# Patient Record
Sex: FEMALE | URBAN - METROPOLITAN AREA
[De-identification: names, ages, dates, MRNs, and addresses within clinical notes are randomized per-mention and may not be internally consistent; named-entity substitution may affect disease eponyms.]

---

## 2017-10-18 ENCOUNTER — INPATIENT (INPATIENT)
Facility: HOSPITAL | Age: 82
LOS: 5 days | Discharge: NURSING, REHAB OR LONG-TERM CARE | End: 2017-10-24
Attending: INTERNAL MEDICINE | Admitting: INTERNAL MEDICINE
Payer: MEDICARE

## 2017-10-18 VITALS
OXYGEN SATURATION: 99 % | DIASTOLIC BLOOD PRESSURE: 104 MMHG | RESPIRATION RATE: 20 BRPM | SYSTOLIC BLOOD PRESSURE: 159 MMHG | HEART RATE: 94 BPM | TEMPERATURE: 97 F

## 2017-10-18 LAB
ALBUMIN SERPL ELPH-MCNC: 3 G/DL — LOW (ref 3.3–5)
ALP SERPL-CCNC: 125 U/L — HIGH (ref 40–120)
ALT FLD-CCNC: 21 U/L — SIGNIFICANT CHANGE UP (ref 12–78)
AMYLASE P1 CFR SERPL: 138 U/L — HIGH (ref 25–115)
ANION GAP SERPL CALC-SCNC: 12 MMOL/L — SIGNIFICANT CHANGE UP (ref 5–17)
APTT BLD: 51 SEC — HIGH (ref 27.5–37.4)
AST SERPL-CCNC: 42 U/L — HIGH (ref 15–37)
BASOPHILS # BLD AUTO: 0.1 K/UL — SIGNIFICANT CHANGE UP (ref 0–0.2)
BASOPHILS NFR BLD AUTO: 1 % — SIGNIFICANT CHANGE UP (ref 0–2)
BILIRUB DIRECT SERPL-MCNC: 0.12 MG/DL — SIGNIFICANT CHANGE UP (ref 0.05–0.2)
BILIRUB INDIRECT FLD-MCNC: 0.3 MG/DL — SIGNIFICANT CHANGE UP (ref 0.2–1)
BILIRUB SERPL-MCNC: 0.4 MG/DL — SIGNIFICANT CHANGE UP (ref 0.2–1.2)
BUN SERPL-MCNC: 19 MG/DL — SIGNIFICANT CHANGE UP (ref 7–23)
CALCIUM SERPL-MCNC: 9.3 MG/DL — SIGNIFICANT CHANGE UP (ref 8.5–10.1)
CHLORIDE SERPL-SCNC: 107 MMOL/L — SIGNIFICANT CHANGE UP (ref 96–108)
CK MB BLD-MCNC: 3.2 % — SIGNIFICANT CHANGE UP (ref 0–3.5)
CK MB CFR SERPL CALC: 7.7 NG/ML — HIGH (ref 0.5–3.6)
CK SERPL-CCNC: 240 U/L — HIGH (ref 26–192)
CO2 SERPL-SCNC: 23 MMOL/L — SIGNIFICANT CHANGE UP (ref 22–31)
CREAT SERPL-MCNC: 1.74 MG/DL — HIGH (ref 0.5–1.3)
EOSINOPHIL # BLD AUTO: 0 K/UL — SIGNIFICANT CHANGE UP (ref 0–0.5)
EOSINOPHIL NFR BLD AUTO: 0.4 % — SIGNIFICANT CHANGE UP (ref 0–6)
GLUCOSE BLDC GLUCOMTR-MCNC: 178 MG/DL — HIGH (ref 70–99)
GLUCOSE SERPL-MCNC: 130 MG/DL — HIGH (ref 70–99)
HCT VFR BLD CALC: 51.7 % — HIGH (ref 34.5–45)
HGB BLD-MCNC: 16.2 G/DL — HIGH (ref 11.5–15.5)
INR BLD: 8.74 RATIO — CRITICAL HIGH (ref 0.88–1.16)
LIDOCAIN IGE QN: 137 U/L — SIGNIFICANT CHANGE UP (ref 73–393)
LYMPHOCYTES # BLD AUTO: 1.4 K/UL — SIGNIFICANT CHANGE UP (ref 1–3.3)
LYMPHOCYTES # BLD AUTO: 18.4 % — SIGNIFICANT CHANGE UP (ref 13–44)
MCHC RBC-ENTMCNC: 30.4 PG — SIGNIFICANT CHANGE UP (ref 27–34)
MCHC RBC-ENTMCNC: 31.4 GM/DL — LOW (ref 32–36)
MCV RBC AUTO: 96.6 FL — SIGNIFICANT CHANGE UP (ref 80–100)
MONOCYTES # BLD AUTO: 0.8 K/UL — SIGNIFICANT CHANGE UP (ref 0–0.9)
MONOCYTES NFR BLD AUTO: 10.8 % — SIGNIFICANT CHANGE UP (ref 2–14)
NEUTROPHILS # BLD AUTO: 5.2 K/UL — SIGNIFICANT CHANGE UP (ref 1.8–7.4)
NEUTROPHILS NFR BLD AUTO: 69.4 % — SIGNIFICANT CHANGE UP (ref 43–77)
PLATELET # BLD AUTO: 192 K/UL — SIGNIFICANT CHANGE UP (ref 150–400)
POTASSIUM SERPL-MCNC: 4.8 MMOL/L — SIGNIFICANT CHANGE UP (ref 3.5–5.3)
POTASSIUM SERPL-SCNC: 4.8 MMOL/L — SIGNIFICANT CHANGE UP (ref 3.5–5.3)
PROT SERPL-MCNC: 8.8 GM/DL — HIGH (ref 6–8.3)
PROTHROM AB SERPL-ACNC: 99.6 SEC — HIGH (ref 9.8–12.7)
RBC # BLD: 5.35 M/UL — HIGH (ref 3.8–5.2)
RBC # FLD: 15.2 % — HIGH (ref 11–15)
SODIUM SERPL-SCNC: 142 MMOL/L — SIGNIFICANT CHANGE UP (ref 135–145)
TROPONIN I SERPL-MCNC: 1.8 NG/ML — HIGH (ref 0.01–0.04)
WBC # BLD: 7.4 K/UL — SIGNIFICANT CHANGE UP (ref 3.8–10.5)
WBC # FLD AUTO: 7.4 K/UL — SIGNIFICANT CHANGE UP (ref 3.8–10.5)

## 2017-10-18 PROCEDURE — 99285 EMERGENCY DEPT VISIT HI MDM: CPT

## 2017-10-18 PROCEDURE — 99223 1ST HOSP IP/OBS HIGH 75: CPT

## 2017-10-18 PROCEDURE — 70450 CT HEAD/BRAIN W/O DYE: CPT | Mod: 26

## 2017-10-18 PROCEDURE — 71010: CPT | Mod: 26

## 2017-10-18 RX ORDER — LEVETIRACETAM 250 MG/1
500 TABLET, FILM COATED ORAL EVERY 12 HOURS
Qty: 0 | Refills: 0 | Status: DISCONTINUED | OUTPATIENT
Start: 2017-10-18 | End: 2017-10-19

## 2017-10-18 RX ORDER — ASPIRIN/CALCIUM CARB/MAGNESIUM 325 MG
325 TABLET ORAL ONCE
Qty: 0 | Refills: 0 | Status: COMPLETED | OUTPATIENT
Start: 2017-10-18 | End: 2017-10-18

## 2017-10-18 RX ORDER — SODIUM CHLORIDE 9 MG/ML
1000 INJECTION INTRAMUSCULAR; INTRAVENOUS; SUBCUTANEOUS
Qty: 0 | Refills: 0 | Status: DISCONTINUED | OUTPATIENT
Start: 2017-10-18 | End: 2017-10-19

## 2017-10-18 RX ADMIN — Medication 325 MILLIGRAM(S): at 21:41

## 2017-10-18 RX ADMIN — LEVETIRACETAM 420 MILLIGRAM(S): 250 TABLET, FILM COATED ORAL at 22:30

## 2017-10-18 RX ADMIN — SODIUM CHLORIDE 100 MILLILITER(S): 9 INJECTION INTRAMUSCULAR; INTRAVENOUS; SUBCUTANEOUS at 19:08

## 2017-10-18 NOTE — ED ADULT TRIAGE NOTE - CHIEF COMPLAINT QUOTE
pt complaining of weakness, decreased appetite and decreased responsiveness times 3 weeks. as per EMS pt was hypotensive on arrival 80/50.  ml given by EMS. blood pressure 159/104 at triage. pt has history of dementia, tia and hypertension

## 2017-10-18 NOTE — ED PROVIDER NOTE - PROGRESS NOTE DETAILS
spoke to dr morales who recommends to call cardiology spoke to dr gee who states that after a seizure activity it is common to have elevated troponins, pt can continue coumadin or can start heparin, she feels this is all neuro

## 2017-10-18 NOTE — ED PROVIDER NOTE - OBJECTIVE STATEMENT
84 year old female presents today brought in with her  and daughter c/o syncope and seizure like activity today, pt was in the bed when she lean over, eyes open and started shaking +urinary and bowel incontinence (-) nausea or vomiting +increased confused as per the family (-) fevers or chills

## 2017-10-18 NOTE — ED PROVIDER NOTE - PMH
Afib (ICD9 427.31)    Anxiety (ICD9 300.00)    Atrial Fibrillation (ICD9 427.31)    CAD (Coronary Artery Disease) (ICD9 414.00)  with STENTS in  apast last CATH  in 10/2009 with 90% MID LAD on Medical managemnt  CVA (Cerebral Vascular Accident) (ICD9 434.91)    Dementia    Dementia (ICD9 294.8)    Depression    GERD (Gastroesophageal Reflux Disease) (ICD9 530.81)    HTN    HTN (hypertension)    Hx of Breast Cancer (ICD9 V10.3)    Hypercholesterolemia (ICD9 272.0)    Hypothyroidism (ICD9 244.9)    Mitral Valve Prolapse    Transient Ischemic Attack (TIA) (ICD9 435.9)

## 2017-10-18 NOTE — CONSULT NOTE ADULT - SUBJECTIVE AND OBJECTIVE BOX
Historian:       RHIANNA:  HIPOLITO AGUILAR.  · Chief Complaint: The patient is a 84y Female complaining of weakness.  · HPI Objective Statement: 84 year old female presents today brought in with her  and daughter c/o syncope and seizure like activity today, pt was in the bed when she lean over, eyes open and started shaking +urinary and bowel incontinence (-) nausea or vomiting +increased confused as per the family (-) fevers or chills    PAST MEDICAL & SURGICAL HISTORY:  HTN (hypertension); Dementia; Mitral Valve Prolapse; Depression; CVA (Cerebral Vascular Accident) (ICD9 434.91); CAD (Coronary Artery Disease) (ICD9 414.00): with STENTS in  apast last CATH  in 10/2009 with 90% MID LAD on Medical management; Dementia (ICD9 294.8); Anxiety (ICD9 300.00); GERD (Gastroesophageal Reflux Disease) (ICD9 530.81); Atrial Fibrillation (ICD9 427.31); Afib (ICD9 427.31); Hypothyroidism (ICD9 244.9); Hx of Breast Cancer (ICD9 V10.3); Hypercholesterolemia (ICD9 272.0); HTN;  Transient Ischemic Attack (TIA) (ICD9 435.9); No significant past surgical history; S/P Breast Lumpectomy (ICD9 V45.89);  Section (ICD9 669.70); After Cataract, Bilateral (ICD9 366.50)    MEDICATIONS  (STANDING):  levETIRAcetam  IVPB 500 milliGRAM(s) IV Intermittent every 12 hours  sodium chloride 0.9%. 1000 milliLiter(s) (100 mL/Hr) IV Continuous <Continuous>    MEDICATIONS  (PRN):  Allergies: latex (Unknown); No Known Drug Allergies; shellfish (Other); shellfish (Rash)  Intolerances  FAMILY HISTORY:  Family history of stroke    Vital Signs Last 24 Hrs  T(C): 36.4 (18 Oct 2017 22:06), Max: 36.4 (18 Oct 2017 22:06)  T(F): 97.6 (18 Oct 2017 22:06), Max: 97.6 (18 Oct 2017 22:06)  HR: 89 (18 Oct 2017 22:06) (89 - 94)  BP: 154/94 (18 Oct 2017 22:06) (154/94 - 159/104)  BP(mean): --  RR: 18 (18 Oct 2017 22:06) (18 - 20)  SpO2: 99% (18 Oct 2017 22:06) (99% - 99%)    NEUROLOGICAL EXAM:  HENT:  Normocephalic head; atraumatic head.  Neck supple.  ENT: normal looking.  Mental State:    Alert.  Fully oriented to person, place and date.  Coherent.  Speech clear and intact.  Cooperative.  Responds appropriately.    Cranial Nerves:  II-XII:   Pupils round and reactive to light and accommodation.  Extraocular movements full.  Visual fields full (no homonymous hemianopsia).  Visual acuity wnl.  Facial symmetry intact.  Tongue midline.  Motor Functions:  Moves all extremities.  No pronator drift of UE.  Claps hand well.  Hand  intact bilaterally.  Ambulatory.    Sensory Functions:   Intact to touch and pinprick to face and extremities.    Reflexes:  Deep tendon reflexes normoactive to biceps, knees and ankles.  Babinski absent (present).  Cerebellar Testing:    Finger to nose intact.  Nystagmus absent.  Neurovascular: Carotid auscultation full without bruits.      LABS:                        16.2   7.4   )-----------( 192      ( 18 Oct 2017 17:34 )             51.7     10-18    142  |  107  |  19  ----------------------------<  130<H>  4.8   |  23  |  1.74<H>    Ca    9.3      18 Oct 2017 17:34    TPro  8.8<H>  /  Alb  3.0<L>  /  TBili  0.4  /  DBili  .12  /  AST  42<H>  /  ALT  21  /  AlkPhos  125<H>  10-18    PT/INR - ( 18 Oct 2017 21:57 )   PT: 99.6 sec;   INR: 8.74 ratio         PTT - ( 18 Oct 2017 21:57 )  PTT:51.0 sec        RADIOLOGY & ADDITIONAL STUDIES:    Complete Blood Count + Automated Diff: STAT (10-18 @ 16:25)  Comprehensive Metabolic Panel: STAT (10-18 @ 16:25)  Amylase, Serum Total: STAT (10-18 @ 16:25)  Lipase, Serum: STAT (10-18 @ 16:25)  Hepatic Function Panel: STAT  Cancel Reason: Duplicate Order (10-18 @ 16:25)  Urinalysis: STAT (10-18 @ 16:25)  Culture - Urine: Routine  Specimen Source: Clean Catch (Midstream) (10-18 @ 16:25)  POCT  Blood Glucose: 16:25 (10-18 @ 16:30)  Bilirubin Direct, Serum: 17:30 (10-18 @ 17:34)  Indirect Reacting Bilirubin: 17:30 (10-18 @ 17:34)  CT Head No Cont: Urgent   Indication: unresponsive episode  Transport: Stretcher-Crib  Exam Completed  Provider's Contact #: [phone] (10-18 @ 18:45)  Xray Chest 1 View AP/PA.: Urgent   Indication: r/o infiltrate  Transport: Stretcher-Crib  Exam Completed  Provider's Contact #: [phone] (10-18 @ 18:45)  sodium chloride 0.9%.: Solution, 1000 milliLiter(s) infuse at 100 mL/Hr  Provider's Contact #: [phone] (10-18 @ 18:55)  Troponin I, Serum: STAT (10-18 @ 18:56)  Creatine Kinase, Serum: STAT (10-18 @ 18:56)  CKMB Mass Assay: STAT (10-18 @ 18:56)  12 Lead ECG:   Provider's Contact #: [phone] (10-18 @ 18:56)  aspirin buffered:   325 milliGRAM(s), Oral, Once, Stop After 1 Doses  Provider's Contact #: [phone] (10-18 @ 20:38)  Prothrombin Time and INR, Plasma:  Start Date:18-Oct-2017. STAT (10-18 @ 21:27)  Activated Partial Thromboplastin Time:  Start Date:18-Oct-2017. STAT (10-18 @ 21:27)  Admit to Inpatient Level of Care.:     Service:  TELEMETRY    Physician:  Holley Grajeda    Additional Instructions:  Diagnosis: Seizure; Elevated troponin; Dementia  Isolation: None  Special Consideration: None (10-18 @ 21:33)  (Floorstock):   Qty Removed: 1 each (10-18 @ 21:36)  levETIRAcetam  IVPB: [Ordered as KEPPRA IVPB]  500 milliGRAM(s) in IV Solution 100 milliLiter(s), IV Intermittent, every 12 hours, infuse over 15 Minute(s)  Administration Instructions: This is a Look-alike/Sound-alike Medication  Provider's Contact #: [phone] (10-18 @ 21:37)  Admit from ED (10-18 @ 22:08)      Assessment & Opinion:    Recommendations:  Brain MRI.  Carotid doppler.  Echocardiogram.  EEG.   DVT prophylaxis as ordered.  Medications: Historian:   .  ER notes also reviewed.    HPI:  HIPOLITO AGUILAR.  · Chief Complaint: The patient is a 84y Female complaining of weakness.  · HPI Objective Statement: 84 year old female presents today brought in with her  and daughter c/o syncope and seizure like activity today, pt was in the bed when she lean over, eyes open and started shaking +urinary and bowel incontinence (-) nausea or vomiting +increased confused as per the family (-) fevers or chills.    Radiology: Brain CT:  1)  chronic ischemic changes in both hemispheres throughout the white matter. Moderate atrophy. No acute abnormality suggested.  2)  no hemorrhage or mass..    PAST MEDICAL & SURGICAL HISTORY:  HTN (hypertension); Dementia; Mitral Valve Prolapse; Depression; CVA (Cerebral Vascular Accident) (ICD9 434.91); CAD (Coronary Artery Disease) (ICD9 414.00): with STENTS in  apast last CATH  in 10/2009 with 90% MID LAD on Medical management; Dementia (ICD9 294.8); Anxiety (ICD9 300.00); GERD (Gastroesophageal Reflux Disease) (ICD9 530.81); Atrial Fibrillation (ICD9 427.31); Afib (ICD9 427.31); Hypothyroidism (ICD9 244.9); Hx of Breast Cancer (ICD9 V10.3); Hypercholesterolemia (ICD9 272.0); HTN;  Transient Ischemic Attack (TIA) (ICD9 435.9); No significant past surgical history; S/P Breast Lumpectomy (ICD9 V45.89);  Section (ICD9 669.70); After Cataract, Bilateral (ICD9 366.50)    MEDICATIONS  (STANDING):  levETIRAcetam  IVPB 500 milliGRAM(s) IV Intermittent every 12 hours  sodium chloride 0.9%. 1000 milliLiter(s) (100 mL/Hr) IV Continuous <Continuous>    MEDICATIONS  (PRN):  Allergies: latex (Unknown); No Known Drug Allergies; shellfish (Other); shellfish (Rash)  Intolerances  FAMILY HISTORY:  Family history of stroke    Vital Signs Last 24 Hrs  T(C): 36.4 (18 Oct 2017 22:06), Max: 36.4 (18 Oct 2017 22:06)  T(F): 97.6 (18 Oct 2017 22:06), Max: 97.6 (18 Oct 2017 22:06)  HR: 89 (18 Oct 2017 22:06) (89 - 94)  BP: 154/94 (18 Oct 2017 22:06) (154/94 - 159/104)  BP(mean): --  RR: 18 (18 Oct 2017 22:06) (18 - 20)  SpO2: 99% (18 Oct 2017 22:06) (99% - 99%)    NEUROLOGICAL EXAM:  HENT:  Normocephalic head; atraumatic head.  Neck supple.  ENT: normal looking.  Mental State:    Lethargic and non-verbal.      Cranial Nerves:  II-XII:   Pupils round and reactive to light and accommodation.  Extraocular movements full.    Motor Functions:  Moves all extremities.  Sensory Functions:   Intact to touch and pinprick to face and extremities.    Reflexes:  Deep tendon reflexes normoactive to knees and ankles.  Babinski absent   Cerebellar Testing:    Finger to nose intact.  Nystagmus absent.  Neurovascular: Carotid auscultation full without bruits.      LABS:                        16.2   7.4   )-----------( 192      ( 18 Oct 2017 17:34 )             51.7     10-18    142  |  107  |  19  ----------------------------<  130<H>  4.8   |  23  |  1.74<H>    Ca    9.3      18 Oct 2017 17:34    TPro  8.8<H>  /  Alb  3.0<L>  /  TBili  0.4  /  DBili  .12  /  AST  42<H>  /  ALT  21  /  AlkPhos  125<H>  10-18    PT/INR - ( 18 Oct 2017 21:57 )   PT: 99.6 sec;   INR: 8.74 ratio         PTT - ( 18 Oct 2017 21:57 )  PTT:51.0 sec    RADIOLOGY & ADDITIONAL STUDIES:    Complete Blood Count + Automated Diff: STAT (10-18 @ 16:25)  Comprehensive Metabolic Panel: STAT (10-18 @ 16:25)  Amylase, Serum Total: STAT (10-18 @ 16:25)  Lipase, Serum: STAT (10-18 @ 16:25)  Hepatic Function Panel: STAT  Cancel Reason: Duplicate Order (10-18 @ 16:25)  Urinalysis: STAT (10-18 @ 16:25)  Culture - Urine: Routine  Specimen Source: Clean Catch (Midstream) (10-18 @ 16:25)  POCT  Blood Glucose: 16:25 (10-18 @ 16:30)  Bilirubin Direct, Serum: 17:30 (10-18 @ 17:34)  Indirect Reacting Bilirubin: 17:30 (10-18 @ 17:34)  CT Head No Cont: Urgent   Indication: unresponsive episode  Transport: Stretcher-Crib  Exam Completed  Provider's Contact #: [phone] (10-18 @ 18:45)  Xray Chest 1 View AP/PA.: Urgent   Indication: r/o infiltrate  Transport: Stretcher-Crib  Exam Completed  Provider's Contact #: [phone] (10-18 @ 18:45)  sodium chloride 0.9%.: Solution, 1000 milliLiter(s) infuse at 100 mL/Hr  Provider's Contact #: [phone] (10-18 @ 18:55)  Troponin I, Serum: STAT (10-18 @ 18:56)  Creatine Kinase, Serum: STAT (10-18 @ 18:56)  CKMB Mass Assay: STAT (10-18 @ 18:56)  12 Lead ECG:   Provider's Contact #: [phone] (10-18 @ 18:56)  aspirin buffered:   325 milliGRAM(s), Oral, Once, Stop After 1 Doses  Provider's Contact #: [phone] (10-18 @ 20:38)  Prothrombin Time and INR, Plasma:  Start Date:18-Oct-2017. STAT (10-18 @ 21:27)  Activated Partial Thromboplastin Time:  Start Date:18-Oct-2017. STAT (10-18 @ 21:27)  Admit to Inpatient Level of Care.:     Service:  TELEMETRY    Physician:  Holley Grajeda    Additional Instructions:  Diagnosis: Seizure; Elevated troponin; Dementia  Isolation: None  Special Consideration: None (10-18 @ 21:33)  (Floorstock):   Qty Removed: 1 each (10-18 @ 21:36)  levETIRAcetam  IVPB: [Ordered as KEPPRA IVPB]  500 milliGRAM(s) in IV Solution 100 milliLiter(s), IV Intermittent, every 12 hours, infuse over 15 Minute(s)  Administration Instructions: This is a Look-alike/Sound-alike Medication  Provider's Contact #: [phone] (10-18 @ 21:37)  Admit from ED (10-18 @ 22:08)    Assessment & Opinion:  SEizures/Syncope.  Etiology to be determined.  Recommendations:  Brain MRI.  Carotid doppler.  Echocardiogram.  EEG.   DVT prophylaxis as ordered.  Medications:  Patient given Keppra in ER. Historian:   .  ER notes also reviewed.    HPI:  HIPOLITO AGUILAR.  · Chief Complaint: The patient is a 84y Female complaining of weakness.  · HPI Objective Statement: 84 year old female presents today brought in with her  and daughter c/o syncope and seizure like activity today, pt was in the bed when she lean over, eyes open and started shaking +urinary and bowel incontinence (-) nausea or vomiting +increased confused as per the family (-) fevers or chills.    Radiology: Brain CT:  1)  chronic ischemic changes in both hemispheres throughout the white matter. Moderate atrophy. No acute abnormality suggested.  2)  no hemorrhage or mass..    PAST MEDICAL & SURGICAL HISTORY:  HTN (hypertension); Dementia; Mitral Valve Prolapse; Depression; CVA (Cerebral Vascular Accident) (ICD9 434.91); CAD (Coronary Artery Disease) (ICD9 414.00): with STENTS in  apast last CATH  in 10/2009 with 90% MID LAD on Medical management; Dementia (ICD9 294.8); Anxiety (ICD9 300.00); GERD (Gastroesophageal Reflux Disease) (ICD9 530.81); Atrial Fibrillation (ICD9 427.31); Afib (ICD9 427.31); Hypothyroidism (ICD9 244.9); Hx of Breast Cancer (ICD9 V10.3); Hypercholesterolemia (ICD9 272.0); HTN;  Transient Ischemic Attack (TIA) (ICD9 435.9); No significant past surgical history; S/P Breast Lumpectomy (ICD9 V45.89);  Section (ICD9 669.70); After Cataract, Bilateral (ICD9 366.50)    MEDICATIONS  (STANDING):  levETIRAcetam  IVPB 500 milliGRAM(s) IV Intermittent every 12 hours  sodium chloride 0.9%. 1000 milliLiter(s) (100 mL/Hr) IV Continuous <Continuous>    MEDICATIONS  (PRN):  Allergies: latex (Unknown); No Known Drug Allergies; shellfish (Other); shellfish (Rash)  Intolerances  FAMILY HISTORY:  Family history of stroke    Vital Signs Last 24 Hrs  T(C): 36.4 (18 Oct 2017 22:06), Max: 36.4 (18 Oct 2017 22:06)  T(F): 97.6 (18 Oct 2017 22:06), Max: 97.6 (18 Oct 2017 22:06)  HR: 89 (18 Oct 2017 22:06) (89 - 94)  BP: 154/94 (18 Oct 2017 22:06) (154/94 - 159/104)  BP(mean): --  RR: 18 (18 Oct 2017 22:06) (18 - 20)  SpO2: 99% (18 Oct 2017 22:06) (99% - 99%)    NEUROLOGICAL EXAM:  HENT:  Normocephalic head; atraumatic head.  Neck supple.  ENT: normal looking.  Mental State:    Lethargic and non-verbal.      Cranial Nerves:  II-XII:   Pupils round and reactive to light and accommodation.  Extraocular movements full.    Motor Functions:  Moves all extremities, but weakly.  Sensory Functions:   Intact to touch and pinprick to face and extremities.    Reflexes:  Deep tendon reflexes normoactive to knees and ankles.  Babinski absent   Cerebellar Testing:    Finger to nose intact.  Nystagmus absent.  Neurovascular: Carotid auscultation full without bruits.      LABS:                        16.2   7.4   )-----------( 192      ( 18 Oct 2017 17:34 )             51.7     10-18    142  |  107  |  19  ----------------------------<  130<H>  4.8   |  23  |  1.74<H>    Ca    9.3      18 Oct 2017 17:34    TPro  8.8<H>  /  Alb  3.0<L>  /  TBili  0.4  /  DBili  .12  /  AST  42<H>  /  ALT  21  /  AlkPhos  125<H>  10-18    PT/INR - ( 18 Oct 2017 21:57 )   PT: 99.6 sec;   INR: 8.74 ratio         PTT - ( 18 Oct 2017 21:57 )  PTT:51.0 sec    RADIOLOGY & ADDITIONAL STUDIES:    Complete Blood Count + Automated Diff: STAT (10-18 @ 16:25)  Comprehensive Metabolic Panel: STAT (10-18 @ 16:25)  Amylase, Serum Total: STAT (10-18 @ 16:25)  Lipase, Serum: STAT (10-18 @ 16:25)  Hepatic Function Panel: STAT  Cancel Reason: Duplicate Order (10-18 @ 16:25)  Urinalysis: STAT (10-18 @ 16:25)  Culture - Urine: Routine  Specimen Source: Clean Catch (Midstream) (10-18 @ 16:25)  POCT  Blood Glucose: 16:25 (10-18 @ 16:30)  Bilirubin Direct, Serum: 17:30 (10-18 @ 17:34)  Indirect Reacting Bilirubin: 17:30 (10-18 @ 17:34)  CT Head No Cont: Urgent   Indication: unresponsive episode  Transport: Stretcher-Crib  Exam Completed  Provider's Contact #: [phone] (10-18 @ 18:45)  Xray Chest 1 View AP/PA.: Urgent   Indication: r/o infiltrate  Transport: Stretcher-Crib  Exam Completed  Provider's Contact #: [phone] (10-18 @ 18:45)  sodium chloride 0.9%.: Solution, 1000 milliLiter(s) infuse at 100 mL/Hr  Provider's Contact #: [phone] (10-18 @ 18:55)  Troponin I, Serum: STAT (10-18 @ 18:56)  Creatine Kinase, Serum: STAT (10-18 @ 18:56)  CKMB Mass Assay: STAT (10-18 @ 18:56)  12 Lead ECG:   Provider's Contact #: [phone] (10-18 @ 18:56)  aspirin buffered:   325 milliGRAM(s), Oral, Once, Stop After 1 Doses  Provider's Contact #: [phone] (10-18 @ 20:38)  Prothrombin Time and INR, Plasma:  Start Date:18-Oct-2017. STAT (10-18 @ 21:27)  Activated Partial Thromboplastin Time:  Start Date:18-Oct-2017. STAT (10-18 @ 21:27)  Admit to Inpatient Level of Care.:     Service:  TELEMETRY    Physician:  Holley Grajeda    Additional Instructions:  Diagnosis: Seizure; Elevated troponin; Dementia  Isolation: None  Special Consideration: None (10-18 @ 21:33)  (Floorstock):   Qty Removed: 1 each (10-18 @ 21:36)  levETIRAcetam  IVPB: [Ordered as KEPPRA IVPB]  500 milliGRAM(s) in IV Solution 100 milliLiter(s), IV Intermittent, every 12 hours, infuse over 15 Minute(s)  Administration Instructions: This is a Look-alike/Sound-alike Medication  Provider's Contact #: [phone] (10-18 @ 21:37)  Admit from ED (10-18 @ 22:08)    Assessment & Opinion:  Seizures/Syncope.  Etiology to be determined.  Recommendations:  Brain MRI.  Carotid doppler.  Echocardiogram.  EEG.   DVT prophylaxis as ordered.  Medications:  Patient given Keppra in ER.  Will follow

## 2017-10-18 NOTE — ED PROVIDER NOTE - PSH
After Cataract, Bilateral (ICD9 366.50)     Section (ICD9 669.70)    No significant past surgical history    S/P Breast Lumpectomy (ICD9 V45.89)

## 2017-10-18 NOTE — ED PROVIDER NOTE - CARE PLAN
Principal Discharge DX:	Seizure  Secondary Diagnosis:	Elevated troponin  Secondary Diagnosis:	Dementia

## 2017-10-19 DIAGNOSIS — I10 ESSENTIAL (PRIMARY) HYPERTENSION: ICD-10-CM

## 2017-10-19 DIAGNOSIS — I34.1 NONRHEUMATIC MITRAL (VALVE) PROLAPSE: ICD-10-CM

## 2017-10-19 DIAGNOSIS — E03.9 HYPOTHYROIDISM, UNSPECIFIED: ICD-10-CM

## 2017-10-19 DIAGNOSIS — N17.9 ACUTE KIDNEY FAILURE, UNSPECIFIED: ICD-10-CM

## 2017-10-19 DIAGNOSIS — R79.1 ABNORMAL COAGULATION PROFILE: ICD-10-CM

## 2017-10-19 DIAGNOSIS — R56.9 UNSPECIFIED CONVULSIONS: ICD-10-CM

## 2017-10-19 DIAGNOSIS — Z29.9 ENCOUNTER FOR PROPHYLACTIC MEASURES, UNSPECIFIED: ICD-10-CM

## 2017-10-19 DIAGNOSIS — R74.8 ABNORMAL LEVELS OF OTHER SERUM ENZYMES: ICD-10-CM

## 2017-10-19 DIAGNOSIS — G40.909 EPILEPSY, UNSPECIFIED, NOT INTRACTABLE, WITHOUT STATUS EPILEPTICUS: ICD-10-CM

## 2017-10-19 DIAGNOSIS — F03.90 UNSPECIFIED DEMENTIA, UNSPECIFIED SEVERITY, WITHOUT BEHAVIORAL DISTURBANCE, PSYCHOTIC DISTURBANCE, MOOD DISTURBANCE, AND ANXIETY: ICD-10-CM

## 2017-10-19 LAB
ALBUMIN SERPL ELPH-MCNC: 2.4 G/DL — LOW (ref 3.3–5)
ALP SERPL-CCNC: 100 U/L — SIGNIFICANT CHANGE UP (ref 40–120)
ALT FLD-CCNC: 19 U/L — SIGNIFICANT CHANGE UP (ref 12–78)
ANION GAP SERPL CALC-SCNC: 12 MMOL/L — SIGNIFICANT CHANGE UP (ref 5–17)
APTT BLD: 43.5 SEC — HIGH (ref 27.5–37.4)
AST SERPL-CCNC: 35 U/L — SIGNIFICANT CHANGE UP (ref 15–37)
BASOPHILS # BLD AUTO: 0.1 K/UL — SIGNIFICANT CHANGE UP (ref 0–0.2)
BASOPHILS NFR BLD AUTO: 1.3 % — SIGNIFICANT CHANGE UP (ref 0–2)
BILIRUB DIRECT SERPL-MCNC: 0.13 MG/DL — SIGNIFICANT CHANGE UP (ref 0.05–0.2)
BILIRUB INDIRECT FLD-MCNC: 0.2 MG/DL — SIGNIFICANT CHANGE UP (ref 0.2–1)
BILIRUB SERPL-MCNC: 0.3 MG/DL — SIGNIFICANT CHANGE UP (ref 0.2–1.2)
BUN SERPL-MCNC: 13 MG/DL — SIGNIFICANT CHANGE UP (ref 7–23)
CALCIUM SERPL-MCNC: 8.1 MG/DL — LOW (ref 8.5–10.1)
CHLORIDE SERPL-SCNC: 109 MMOL/L — HIGH (ref 96–108)
CK MB BLD-MCNC: 3.3 % — SIGNIFICANT CHANGE UP (ref 0–3.5)
CK MB BLD-MCNC: 3.8 % — HIGH (ref 0–3.5)
CK MB CFR SERPL CALC: 8.1 NG/ML — HIGH (ref 0.5–3.6)
CK MB CFR SERPL CALC: 8.1 NG/ML — HIGH (ref 0.5–3.6)
CK SERPL-CCNC: 216 U/L — HIGH (ref 26–192)
CK SERPL-CCNC: 247 U/L — HIGH (ref 26–192)
CO2 SERPL-SCNC: 21 MMOL/L — LOW (ref 22–31)
CREAT SERPL-MCNC: 1.25 MG/DL — SIGNIFICANT CHANGE UP (ref 0.5–1.3)
EOSINOPHIL # BLD AUTO: 0 K/UL — SIGNIFICANT CHANGE UP (ref 0–0.5)
EOSINOPHIL NFR BLD AUTO: 0.6 % — SIGNIFICANT CHANGE UP (ref 0–6)
GLUCOSE SERPL-MCNC: 73 MG/DL — SIGNIFICANT CHANGE UP (ref 70–99)
HCT VFR BLD CALC: 42 % — SIGNIFICANT CHANGE UP (ref 34.5–45)
HGB BLD-MCNC: 13.6 G/DL — SIGNIFICANT CHANGE UP (ref 11.5–15.5)
INR BLD: 5 RATIO — CRITICAL HIGH (ref 0.88–1.16)
INR BLD: 8.66 RATIO — CRITICAL HIGH (ref 0.88–1.16)
LYMPHOCYTES # BLD AUTO: 2.1 K/UL — SIGNIFICANT CHANGE UP (ref 1–3.3)
LYMPHOCYTES # BLD AUTO: 27.8 % — SIGNIFICANT CHANGE UP (ref 13–44)
MAGNESIUM SERPL-MCNC: 1.8 MG/DL — SIGNIFICANT CHANGE UP (ref 1.6–2.6)
MCHC RBC-ENTMCNC: 30.8 PG — SIGNIFICANT CHANGE UP (ref 27–34)
MCHC RBC-ENTMCNC: 32.5 GM/DL — SIGNIFICANT CHANGE UP (ref 32–36)
MCV RBC AUTO: 94.9 FL — SIGNIFICANT CHANGE UP (ref 80–100)
MONOCYTES # BLD AUTO: 0.6 K/UL — SIGNIFICANT CHANGE UP (ref 0–0.9)
MONOCYTES NFR BLD AUTO: 7.6 % — SIGNIFICANT CHANGE UP (ref 2–14)
NEUTROPHILS # BLD AUTO: 4.8 K/UL — SIGNIFICANT CHANGE UP (ref 1.8–7.4)
NEUTROPHILS NFR BLD AUTO: 62.7 % — SIGNIFICANT CHANGE UP (ref 43–77)
PHOSPHATE SERPL-MCNC: 2 MG/DL — LOW (ref 2.5–4.5)
PLATELET # BLD AUTO: 171 K/UL — SIGNIFICANT CHANGE UP (ref 150–400)
POTASSIUM SERPL-MCNC: 3.9 MMOL/L — SIGNIFICANT CHANGE UP (ref 3.5–5.3)
POTASSIUM SERPL-SCNC: 3.9 MMOL/L — SIGNIFICANT CHANGE UP (ref 3.5–5.3)
PROT SERPL-MCNC: 6.9 GM/DL — SIGNIFICANT CHANGE UP (ref 6–8.3)
PROTHROM AB SERPL-ACNC: 56.3 SEC — HIGH (ref 9.8–12.7)
PROTHROM AB SERPL-ACNC: 98.7 SEC — HIGH (ref 9.8–12.7)
RBC # BLD: 4.43 M/UL — SIGNIFICANT CHANGE UP (ref 3.8–5.2)
RBC # FLD: 15.3 % — HIGH (ref 11–15)
SODIUM SERPL-SCNC: 142 MMOL/L — SIGNIFICANT CHANGE UP (ref 135–145)
TROPONIN I SERPL-MCNC: 2.04 NG/ML — HIGH (ref 0.01–0.04)
TROPONIN I SERPL-MCNC: 2.1 NG/ML — HIGH (ref 0.01–0.04)
WBC # BLD: 7.6 K/UL — SIGNIFICANT CHANGE UP (ref 3.8–10.5)
WBC # FLD AUTO: 7.6 K/UL — SIGNIFICANT CHANGE UP (ref 3.8–10.5)

## 2017-10-19 PROCEDURE — 99223 1ST HOSP IP/OBS HIGH 75: CPT

## 2017-10-19 PROCEDURE — 93010 ELECTROCARDIOGRAM REPORT: CPT

## 2017-10-19 PROCEDURE — 99233 SBSQ HOSP IP/OBS HIGH 50: CPT

## 2017-10-19 RX ORDER — LEVETIRACETAM 250 MG/1
250 TABLET, FILM COATED ORAL EVERY 12 HOURS
Qty: 0 | Refills: 0 | Status: DISCONTINUED | OUTPATIENT
Start: 2017-10-19 | End: 2017-10-24

## 2017-10-19 RX ORDER — DONEPEZIL HYDROCHLORIDE 10 MG/1
10 TABLET, FILM COATED ORAL AT BEDTIME
Qty: 0 | Refills: 0 | Status: DISCONTINUED | OUTPATIENT
Start: 2017-10-19 | End: 2017-10-24

## 2017-10-19 RX ORDER — CARVEDILOL PHOSPHATE 80 MG/1
6.25 CAPSULE, EXTENDED RELEASE ORAL EVERY 12 HOURS
Qty: 0 | Refills: 0 | Status: DISCONTINUED | OUTPATIENT
Start: 2017-10-19 | End: 2017-10-24

## 2017-10-19 RX ORDER — ASPIRIN/CALCIUM CARB/MAGNESIUM 324 MG
81 TABLET ORAL DAILY
Qty: 0 | Refills: 0 | Status: DISCONTINUED | OUTPATIENT
Start: 2017-10-19 | End: 2017-10-24

## 2017-10-19 RX ORDER — LEVOTHYROXINE SODIUM 125 MCG
75 TABLET ORAL DAILY
Qty: 0 | Refills: 0 | Status: DISCONTINUED | OUTPATIENT
Start: 2017-10-19 | End: 2017-10-24

## 2017-10-19 RX ORDER — LEVETIRACETAM 250 MG/1
500 TABLET, FILM COATED ORAL ONCE
Qty: 0 | Refills: 0 | Status: COMPLETED | OUTPATIENT
Start: 2017-10-19 | End: 2017-10-19

## 2017-10-19 RX ORDER — PHYTONADIONE (VIT K1) 5 MG
5 TABLET ORAL ONCE
Qty: 0 | Refills: 0 | Status: COMPLETED | OUTPATIENT
Start: 2017-10-19 | End: 2017-10-19

## 2017-10-19 RX ORDER — PANTOPRAZOLE SODIUM 20 MG/1
40 TABLET, DELAYED RELEASE ORAL DAILY
Qty: 0 | Refills: 0 | Status: DISCONTINUED | OUTPATIENT
Start: 2017-10-19 | End: 2017-10-24

## 2017-10-19 RX ORDER — MEMANTINE HYDROCHLORIDE 10 MG/1
10 TABLET ORAL
Qty: 0 | Refills: 0 | Status: DISCONTINUED | OUTPATIENT
Start: 2017-10-19 | End: 2017-10-24

## 2017-10-19 RX ORDER — SODIUM CHLORIDE 9 MG/ML
1000 INJECTION INTRAMUSCULAR; INTRAVENOUS; SUBCUTANEOUS
Qty: 0 | Refills: 0 | Status: DISCONTINUED | OUTPATIENT
Start: 2017-10-19 | End: 2017-10-21

## 2017-10-19 RX ORDER — ONDANSETRON 8 MG/1
4 TABLET, FILM COATED ORAL EVERY 6 HOURS
Qty: 0 | Refills: 0 | Status: DISCONTINUED | OUTPATIENT
Start: 2017-10-19 | End: 2017-10-24

## 2017-10-19 RX ADMIN — LEVETIRACETAM 400 MILLIGRAM(S): 250 TABLET, FILM COATED ORAL at 17:08

## 2017-10-19 RX ADMIN — PANTOPRAZOLE SODIUM 40 MILLIGRAM(S): 20 TABLET, DELAYED RELEASE ORAL at 12:23

## 2017-10-19 RX ADMIN — Medication 81 MILLIGRAM(S): at 12:23

## 2017-10-19 RX ADMIN — CARVEDILOL PHOSPHATE 6.25 MILLIGRAM(S): 80 CAPSULE, EXTENDED RELEASE ORAL at 17:08

## 2017-10-19 RX ADMIN — SODIUM CHLORIDE 75 MILLILITER(S): 9 INJECTION INTRAMUSCULAR; INTRAVENOUS; SUBCUTANEOUS at 17:07

## 2017-10-19 RX ADMIN — SODIUM CHLORIDE 75 MILLILITER(S): 9 INJECTION INTRAMUSCULAR; INTRAVENOUS; SUBCUTANEOUS at 02:41

## 2017-10-19 RX ADMIN — CARVEDILOL PHOSPHATE 6.25 MILLIGRAM(S): 80 CAPSULE, EXTENDED RELEASE ORAL at 05:32

## 2017-10-19 RX ADMIN — MEMANTINE HYDROCHLORIDE 10 MILLIGRAM(S): 10 TABLET ORAL at 05:32

## 2017-10-19 RX ADMIN — LEVETIRACETAM 400 MILLIGRAM(S): 250 TABLET, FILM COATED ORAL at 05:31

## 2017-10-19 RX ADMIN — MEMANTINE HYDROCHLORIDE 10 MILLIGRAM(S): 10 TABLET ORAL at 17:08

## 2017-10-19 RX ADMIN — Medication 75 MICROGRAM(S): at 05:32

## 2017-10-19 RX ADMIN — DONEPEZIL HYDROCHLORIDE 10 MILLIGRAM(S): 10 TABLET, FILM COATED ORAL at 21:27

## 2017-10-19 RX ADMIN — Medication 101 MILLIGRAM(S): at 15:40

## 2017-10-19 RX ADMIN — LEVETIRACETAM 420 MILLIGRAM(S): 250 TABLET, FILM COATED ORAL at 02:44

## 2017-10-19 NOTE — H&P ADULT - NSHPLABSRESULTS_GEN_ALL_CORE
Vital Signs Last 24 Hrs  T(C): 37.1 (18 Oct 2017 23:28), Max: 37.1 (18 Oct 2017 23:28)  T(F): 98.8 (18 Oct 2017 23:28), Max: 98.8 (18 Oct 2017 23:28)  HR: 65 (18 Oct 2017 23:28) (65 - 94)  BP: 115/62 (18 Oct 2017 23:28) (115/62 - 159/104)  BP(mean): --  RR: 13 (18 Oct 2017 23:28) (13 - 20)  SpO2: 99% (18 Oct 2017 23:28) (99% - 99%)                        16.2   7.4   )-----------( 192      ( 18 Oct 2017 17:34 )             51.7   10-18    142  |  107  |  19  ----------------------------<  130<H>  4.8   |  23  |  1.74<H>    Ca    9.3      18 Oct 2017 17:34    TPro  8.8<H>  /  Alb  3.0<L>  /  TBili  0.4  /  DBili  .12  /  AST  42<H>  /  ALT  21  /  AlkPhos  125<H>  10-18    CARDIAC MARKERS ( 18 Oct 2017 19:00 )  1.800 ng/mL / x     / 240 U/L / x     / 7.7 ng/mL    PT/INR - ( 18 Oct 2017 21:57 )   PT: 99.6 sec;   INR: 8.74 ratio    PTT - ( 18 Oct 2017 21:57 )  PTT:51.0 sec    CT Head No Cont (10.18.17 @ 20:48)   IMPRESSION:    1)  chronic ischemic changes in both hemispheres throughout the white   matter. Moderate atrophy. No acute abnormality suggested.  2)  no hemorrhage or mass..    Xray Chest 1 View AP/PA. (10.18.17 @ 19:56)   Impression: No active disease. No change.

## 2017-10-19 NOTE — CONSULT NOTE ADULT - ASSESSMENT
83 y/o female with PMHx significant dementia, mitral valve prolapse, PSHx of lumpectomy for breast cancer presents after witnessed seizure by .   Appears comfortable; sleeping in bed with  in chair.  Seems better as per him.  Still minimally verbal.  Started on Keppra.  Breanne mildly positive; EKG with inferior and inferolateral TWIs and mild ST depressions.  +trops likely 2/2 combination of a CNS events in the setting of renal insufficiency, with likely underlying CAD.  Creat slowly improving with IVFs.    -trend Breanne  -monitor on tele  -2D echo pending  -on asa/coreg  -daily EKG  -EEG pending/neuro eval  -gentle IVFs  -SW eval for placement/help at home  -not a candidate at this time for any invasive cardiac/ischemic evaluation; supportive care for now  -discuss goals of care with family; ?palliative  -will sign off for now; please call back with further questions

## 2017-10-19 NOTE — H&P ADULT - PROBLEM SELECTOR PLAN 2
-Hold Coumadin, anticoagulation  -Vit K  -Intermittent pneumatic compression for DVT ppx -Hold Coumadin  -Vit K  -Intermittent pneumatic compression for DVT ppx -Hold Coumadin  -No vitamin K or FFP for now  -Intermittent pneumatic compression for DVT ppx  -Fecal occult blood test x 2 ordered   -Trend coag labs

## 2017-10-19 NOTE — H&P ADULT - ASSESSMENT
85 y/o female with PMHx dementia, mitral valve prolapse, PSHx of lumpectomy for breast cancer presents after witnessed seizure by .

## 2017-10-19 NOTE — H&P ADULT - PROBLEM SELECTOR PLAN 1
-Admit patient to telemetry, bedside telemetry, continuous pulse ox  -Neurology consult  -Keppra 500 BID  -Seizure precautions  -IVF  -NPO for now. Dysphagia evaluation in AM  -Pain management prn  -Anti-emetic prn  -DVT ppx with intermittent pneumatic compression -Admit patient to telemetry, bedside telemetry, continuous pulse ox  -Neurology consult: Dr. Oconnor  -Keppra 500 IV given by ED. Another Keppra 500 IV ordered  -Keppra 250 IV BID  -EEG ordered  -Possible MRI per neurology  -Seizure precautions  -IVF  -NPO for now. Dysphagia evaluation in AM  -Pain management prn  -Anti-emetic prn  -GI ppx with Protonix   -DVT ppx with intermittent pneumatic compression  -Discussed with Dr. Grajeda

## 2017-10-19 NOTE — PROGRESS NOTE ADULT - PROBLEM SELECTOR PLAN 3
-Likely reactive to elevated INR  -Trend cardiac enzymes  -No anticoagulation  -Cardiology consulted

## 2017-10-19 NOTE — H&P ADULT - ATTENDING COMMENTS
Pt seen and examined at bedside. Pt is 83 y/o woman with dementia, non verbal, who was observed to have possible seizure episode with urinary incontinence, no seizure history according to family. No complaint of chest pain or SOB.  VSS  Heent LARRY  neck supple  lungs clear  heart S1 S2  abd soft benign  Neuro no focal deficits  labs remarkable for elevated troponin, elevated INR  Pt started on anti seizure medications, seen by Neuro  Plan as above

## 2017-10-19 NOTE — CONSULT NOTE ADULT - SUBJECTIVE AND OBJECTIVE BOX
CARDIOLOGY CONSULT NOTE    Patient is a 84y Female with a known history of :  Seizure disorder (G40.909)  Prophylactic measure (Z29.9)  Hypothyroid (E03.9)  Mitral Valve Prolapse (I34.1)  Dementia (F03.90)  HTN (hypertension) (I10)  DORY (acute kidney injury) (N17.9)  Elevated troponin (R74.8)  Elevated INR (R79.1)  Seizure (R56.9)    HPI:  85 y/o female with PMHx significant dementia, mitral valve prolapse, PSHx of lumpectomy for breast cancer presents after witnessed seizure by . Patient seen in post-ictal state.  states wife was sitting in a chair and slumped over and states patient's body shook. Patient voided in pants during the event but did not bite her tongue.  and daughter deny prior seizures and state that she was never on seizure medication. They state that patient has been increasingly lethargic and with a decreased mental state over the past few weeks that is more than her usual dementia. They state patient has stopped talking and has just been moaning over the past few weeks. Possible fever at home. No nausea, vomiting, constipation, diarrhea, hematuria, melena, hematochezia, bleeding, chest pain, shortness of breath, dizziness, head trauma, headache, neck pain.     Appears comfortable; sleeping in bed with  in chair.  Seems better as per him.  Still minimally verbal.  Started on Keppra.      REVIEW OF SYSTEMS:  Sleeping and not verbal; unable to obtain      MEDICATIONS  (STANDING):  aspirin  chewable 81 milliGRAM(s) Oral daily  carvedilol 6.25 milliGRAM(s) Oral every 12 hours  donepezil 10 milliGRAM(s) Oral at bedtime  levETIRAcetam  IVPB 250 milliGRAM(s) IV Intermittent every 12 hours  levothyroxine 75 MICROGram(s) Oral daily  memantine 10 milliGRAM(s) Oral two times a day  pantoprazole  Injectable 40 milliGRAM(s) IV Push daily  phytonadione  IVPB 5 milliGRAM(s) IV Intermittent once  sodium chloride 0.9%. 1000 milliLiter(s) (75 mL/Hr) IV Continuous <Continuous>    MEDICATIONS  (PRN):  LORazepam     Tablet 0.5 milliGRAM(s) Oral two times a day PRN Agitation  ondansetron Injectable 4 milliGRAM(s) IV Push every 6 hours PRN Nausea and/or Vomiting      ALLERGIES: latex (Unknown)  No Known Drug Allergies  shellfish (Other)  shellfish (Rash)      FAMILY HISTORY:  Family history of stroke (Mother)      PHYSICAL EXAMINATION:  -----------------------------  T(C): 36.1 (10-19-17 @ 12:06), Max: 37.1 (10-18-17 @ 23:28)  HR: 80 (10-19-17 @ 12:06) (65 - 94)  BP: 184/91 (10-19-17 @ 12:06) (115/62 - 197/96)  RR: 18 (10-19-17 @ 12:06) (13 - 20)  SpO2: 99% (10-19-17 @ 12:06) (99% - 100%)  Wt(kg): --    10-18 @ 07:01  -  10-19 @ 07:00  --------------------------------------------------------  IN:    IV PiggyBack: 200 mL    sodium chloride 0.9%.: 225 mL  Total IN: 425 mL    OUT:  Total OUT: 0 mL    Total NET: 425 mL            Constitutional: frail  Eyes: the conjunctiva exhibited no abnormalities and the eyelids demonstrated no xanthelasmas.   HEENT: normal oral mucosa, no oral pallor and no oral cyanosis.   Neck: normal jugular venous A waves present, normal jugular venous V waves present and no jugular venous enriquez A waves.   Pulmonary: no respiratory distress, normal respiratory rhythm; decreased BS at bases but no rales  Cardiovascular: heart rate and rhythm were normal, normal S1 and S2; 2/6 SM  Abdomen: soft, non-tender, no hepato-splenomegaly and no abdominal mass palpated.   Musculoskeletal: the gait could not be assessed..   Extremities: no clubbing of the fingernails, no localized cyanosis, no petechial hemorrhages and no ischemic changes.   Skin: normal skin color and pigmentation, no rash, no venous stasis, no skin lesions, no skin ulcer and no xanthoma was observed.   Psychiatric: oriented to person    LABS:   --------  10-19    142  |  109<H>  |  13  ----------------------------<  73  3.9   |  21<L>  |  1.25    Ca    8.1<L>      19 Oct 2017 07:35  Phos  2.0     10-19  Mg     1.8     10-19    TPro  6.9  /  Alb  2.4<L>  /  TBili  0.3  /  DBili  .13  /  AST  35  /  ALT  19  /  AlkPhos  100  10-19                         13.6   7.6   )-----------( 171      ( 19 Oct 2017 07:35 )             42.0     PT/INR - ( 19 Oct 2017 07:35 )   PT: 98.7 sec;   INR: 8.66 ratio         PTT - ( 19 Oct 2017 07:35 )  PTT:43.5 sec    10-19 @ 11:05 CPK total:--, CKMB --, Troponin I - 2.100 ng/mL<H>  10-19 @ 03:54 CPK total:--, CKMB --, Troponin I - 2.040 ng/mL<H>  10-18 @ 19:00 CPK total:--, CKMB --, Troponin I - 1.800 ng/mL<H>          RADIOLOGY:  -----------------    ECG: NSR 84bpm; LVH; inferolateral TWIs and mild ST depressions

## 2017-10-20 ENCOUNTER — TRANSCRIPTION ENCOUNTER (OUTPATIENT)
Age: 82
End: 2017-10-20

## 2017-10-20 DIAGNOSIS — R13.10 DYSPHAGIA, UNSPECIFIED: ICD-10-CM

## 2017-10-20 LAB
ANION GAP SERPL CALC-SCNC: 11 MMOL/L — SIGNIFICANT CHANGE UP (ref 5–17)
BUN SERPL-MCNC: 9 MG/DL — SIGNIFICANT CHANGE UP (ref 7–23)
CALCIUM SERPL-MCNC: 8.1 MG/DL — LOW (ref 8.5–10.1)
CHLORIDE SERPL-SCNC: 109 MMOL/L — HIGH (ref 96–108)
CO2 SERPL-SCNC: 21 MMOL/L — LOW (ref 22–31)
CREAT SERPL-MCNC: 1.12 MG/DL — SIGNIFICANT CHANGE UP (ref 0.5–1.3)
GLUCOSE SERPL-MCNC: 66 MG/DL — LOW (ref 70–99)
HCT VFR BLD CALC: 41.1 % — SIGNIFICANT CHANGE UP (ref 34.5–45)
HGB BLD-MCNC: 13.2 G/DL — SIGNIFICANT CHANGE UP (ref 11.5–15.5)
INR BLD: 1.6 RATIO — HIGH (ref 0.88–1.16)
MCHC RBC-ENTMCNC: 30.6 PG — SIGNIFICANT CHANGE UP (ref 27–34)
MCHC RBC-ENTMCNC: 32.1 GM/DL — SIGNIFICANT CHANGE UP (ref 32–36)
MCV RBC AUTO: 95.6 FL — SIGNIFICANT CHANGE UP (ref 80–100)
PLATELET # BLD AUTO: 169 K/UL — SIGNIFICANT CHANGE UP (ref 150–400)
POTASSIUM SERPL-MCNC: 3.7 MMOL/L — SIGNIFICANT CHANGE UP (ref 3.5–5.3)
POTASSIUM SERPL-SCNC: 3.7 MMOL/L — SIGNIFICANT CHANGE UP (ref 3.5–5.3)
PROTHROM AB SERPL-ACNC: 17.6 SEC — HIGH (ref 9.8–12.7)
RBC # BLD: 4.3 M/UL — SIGNIFICANT CHANGE UP (ref 3.8–5.2)
RBC # FLD: 14.7 % — SIGNIFICANT CHANGE UP (ref 11–15)
SODIUM SERPL-SCNC: 141 MMOL/L — SIGNIFICANT CHANGE UP (ref 135–145)
WBC # BLD: 6 K/UL — SIGNIFICANT CHANGE UP (ref 3.8–10.5)
WBC # FLD AUTO: 6 K/UL — SIGNIFICANT CHANGE UP (ref 3.8–10.5)

## 2017-10-20 PROCEDURE — 99233 SBSQ HOSP IP/OBS HIGH 50: CPT

## 2017-10-20 RX ORDER — WARFARIN SODIUM 2.5 MG/1
3 TABLET ORAL ONCE
Qty: 0 | Refills: 0 | Status: COMPLETED | OUTPATIENT
Start: 2017-10-20 | End: 2017-10-20

## 2017-10-20 RX ADMIN — LEVETIRACETAM 400 MILLIGRAM(S): 250 TABLET, FILM COATED ORAL at 17:37

## 2017-10-20 RX ADMIN — LEVETIRACETAM 400 MILLIGRAM(S): 250 TABLET, FILM COATED ORAL at 06:08

## 2017-10-20 RX ADMIN — DONEPEZIL HYDROCHLORIDE 10 MILLIGRAM(S): 10 TABLET, FILM COATED ORAL at 21:49

## 2017-10-20 RX ADMIN — MEMANTINE HYDROCHLORIDE 10 MILLIGRAM(S): 10 TABLET ORAL at 05:30

## 2017-10-20 RX ADMIN — WARFARIN SODIUM 3 MILLIGRAM(S): 2.5 TABLET ORAL at 21:49

## 2017-10-20 RX ADMIN — CARVEDILOL PHOSPHATE 6.25 MILLIGRAM(S): 80 CAPSULE, EXTENDED RELEASE ORAL at 17:38

## 2017-10-20 RX ADMIN — SODIUM CHLORIDE 75 MILLILITER(S): 9 INJECTION INTRAMUSCULAR; INTRAVENOUS; SUBCUTANEOUS at 19:00

## 2017-10-20 RX ADMIN — SODIUM CHLORIDE 75 MILLILITER(S): 9 INJECTION INTRAMUSCULAR; INTRAVENOUS; SUBCUTANEOUS at 05:36

## 2017-10-20 RX ADMIN — CARVEDILOL PHOSPHATE 6.25 MILLIGRAM(S): 80 CAPSULE, EXTENDED RELEASE ORAL at 05:29

## 2017-10-20 RX ADMIN — MEMANTINE HYDROCHLORIDE 10 MILLIGRAM(S): 10 TABLET ORAL at 17:38

## 2017-10-20 RX ADMIN — Medication 75 MICROGRAM(S): at 05:29

## 2017-10-20 RX ADMIN — PANTOPRAZOLE SODIUM 40 MILLIGRAM(S): 20 TABLET, DELAYED RELEASE ORAL at 11:55

## 2017-10-20 RX ADMIN — Medication 81 MILLIGRAM(S): at 12:58

## 2017-10-20 NOTE — DISCHARGE NOTE ADULT - SECONDARY DIAGNOSIS.
Acquired hypothyroidism Mitral Valve Prolapse Alzheimer's dementia with behavioral disturbance, unspecified timing of dementia onset DORY (acute kidney injury) Elevated troponin Weakness

## 2017-10-20 NOTE — DIETITIAN INITIAL EVALUATION ADULT. - FACTORS AFF FOOD INTAKE
difficulty swallowing/change in mental status/pt had trouble swallowing pills PTA, ate regular foods until 2 weeks ago, pt c poor appetite x 3 months/difficulty feeding self/difficulty with food procurement/preparation/persistent lack of appetite

## 2017-10-20 NOTE — DISCHARGE NOTE ADULT - CARE PROVIDERS DIRECT ADDRESSES
,DirectAddress_Unknown ,DirectAddress_Unknown,[email or web address]

## 2017-10-20 NOTE — DIETITIAN INITIAL EVALUATION ADULT. - NS AS NUTRI INTERV MEALS SNACK
Mineral - modified diet/Recommend trial of Dysphagia 1 Pureed- Thin Liquids , Low sodium/Texture-modified diet Mineral - modified diet/Recommend Dysphagia 1 Pureed- Honey Consistency Fluids , aspiration precautions/Fluid - modified diet/Texture-modified diet

## 2017-10-20 NOTE — DISCHARGE NOTE ADULT - PLAN OF CARE
Breanne mildly positive; EKG with inferior and inferolateral TWIs and mild ST depressions.  +trops likely 2/2 combination of a CNS events in the setting of renal insufficiency, with likely underlying CAD. on asa/coreg. not a candidate at this time for any invasive cardiac/ischemic evaluation; supportive care for now. cont on home meds supportive care no further seizure like activities started on keppra. follow up with neurology resolving follow up with pmd to monitor bmp cont on coumadin as home dose and follow up with pmd for INR monitoring will continue with maintenance Keppra daily. EEG was done and results are pending. Pt is to follow up with neurology as outpatient with possible elective MRI. resolved The patient renal function has stabilized, she most likely has Chronic kidney disease stage III as a baseline. Will continue with asa/coreg. not a candidate at this time for any invasive cardiac/ischemic evaluation; supportive care for now. moderate Aortic stenosis as well with diastolic CHF, will Continue with current medications, cont daily coumadin along with daily INR monitoring. follow up with cardiology in 2 weeks. Improvement with ambulation Rehab has recommended Subacute Rehab on discharge.

## 2017-10-20 NOTE — DIETITIAN INITIAL EVALUATION ADULT. - NS AS NUTRI INTERV ED CONTENT
Educate  on ways to improve oral intake based on pt's tolerance to oral feeding & consistency of diet/Purpose of the nutrition education

## 2017-10-20 NOTE — DIETITIAN INITIAL EVALUATION ADULT. - OTHER INFO
Pt. was seen due to RN consult for chewing/swallowing.   Pt is awaiting swallow evaluation & is NPO at present.  RD spoke c MD for trial of Dysphagia 1 Pureed- Thin Liquids c Ensure c aspiration precautions & discontinue if c concerns c swallowing.  Recommend Palliative care consult. Pt. was seen due to RN consult for chewing/swallowing.   Pt is awaiting swallow evaluation & is NPO at present.  RD spoke c MD for trial of Dysphagia 1 Pureed- Thin Liquids c Ensure c aspiration precautions & discontinue if c concerns c swallowing.  Recommend Palliative care consult.  Spoke c Palliative care RN. Pt. was seen due to RN consult for chewing/swallowing.   Pt is awaiting swallow evaluation & is NPO at present.  RN reports that pt tolerated meds c applesauce.  RD spoke c MD for trial of Dysphagia diet.  Recommend Dysphagia 1 Pureed- Honey Consistency Fluids  c aspiration precautions & discontinue if c concerns c swallowing.  Recommend Palliative care consult.  Spoke c Palliative care RN.

## 2017-10-20 NOTE — CHART NOTE - NSCHARTNOTEFT_GEN_A_CORE
Upon Nutritional Assessment by the Registered Dietitian your patient was determined to meet criteria / has evidence of the following diagnosis/diagnoses:          [ ]  Mild Protein Calorie Malnutrition        [ X]  Moderate Protein Calorie Malnutrition        [ ] Severe Protein Calorie Malnutrition        [ ] Unspecified Protein Calorie Malnutrition        [ ] Underweight / BMI <19        [ ] Morbid Obesity / BMI > 40      Findings as based on:  •  Comprehensive nutrition assessment and consultation  •  Calorie counts (nutrient intake analysis)  •  Food acceptance and intake status from observations by staff  •  Follow up  •  Patient education  •  Intervention secondary to interdisciplinary rounds  •   concerns      Treatment:    The following diet has been recommended:  Low sodium , Dysphagia 1 Pureed- Thin Liquids , aspiration precautions , Ensure Enlive 2 x day=750 calories, 40 grams protein  Palliative care consult       PROVIDER Section:     By signing this assessment you are acknowledging and agree with the diagnosis/diagnoses assigned by the Registered Dietitian    Comments: Upon Nutritional Assessment by the Registered Dietitian your patient was determined to meet criteria / has evidence of the following diagnosis/diagnoses:          [ ]  Mild Protein Calorie Malnutrition        [ X]  Moderate Protein Calorie Malnutrition        [ ] Severe Protein Calorie Malnutrition        [ ] Unspecified Protein Calorie Malnutrition        [ ] Underweight / BMI <19        [ ] Morbid Obesity / BMI > 40      Findings as based on:  •  Comprehensive nutrition assessment and consultation  •  Calorie counts (nutrient intake analysis)  •  Food acceptance and intake status from observations by staff  •  Follow up  •  Patient education  •  Intervention secondary to interdisciplinary rounds  •   concerns      Treatment:    The following diet has been recommended:  Low sodium , Dysphagia 1 Pureed- Honey Consistency Fluids , aspiration precautions , Ensure Pudding 4 oz 3x/day (510 arthur, 12 gm pro)   Palliative care consult       PROVIDER Section:     By signing this assessment you are acknowledging and agree with the diagnosis/diagnoses assigned by the Registered Dietitian    Comments:

## 2017-10-20 NOTE — DIETITIAN INITIAL EVALUATION ADULT. - NS FNS REASON FOR WEIGHT CHANG
decreased po intake/ states that pt appears thinner, but unable to provide accurate wt. hx/other (specify)

## 2017-10-20 NOTE — DISCHARGE NOTE ADULT - PATIENT PORTAL LINK FT
“You can access the [facility] Patient Portal, offered by [facility], by registering with the following website: [email or web address]”

## 2017-10-20 NOTE — DIETITIAN INITIAL EVALUATION ADULT. - PERTINENT MEDS FT
warfarin , levothyroxine , ondansetron prn for N/V q 6 hours , carvedilol , pantoprazole , sodium chloride 0.9% @ 75 ml/hr

## 2017-10-20 NOTE — PROGRESS NOTE ADULT - PROBLEM SELECTOR PLAN 7
-levothyroxine -resume Coumadin 3mg   -Fecal occult blood test x 2 ordered   -monitor INR, monitor for bleeding

## 2017-10-20 NOTE — PROGRESS NOTE ADULT - PROBLEM SELECTOR PLAN 6
-resume Coumadin 3mg   -Fecal occult blood test x 2 ordered   -monitor INR, monitor for bleeding -supportive care

## 2017-10-20 NOTE — DIETITIAN INITIAL EVALUATION ADULT. - ENERGY NEEDS
Height (cm): 157.48 (10-20)  Weight (kg): 53 (10-20)  BMI (kg/m2): 21.4 (10-20)  IBW: 49.8 kg  % IBW: 106%       UBW:   ?         %UBW: ?

## 2017-10-20 NOTE — DISCHARGE NOTE ADULT - MEDICATION SUMMARY - MEDICATIONS TO STOP TAKING
I will STOP taking the medications listed below when I get home from the hospital:    ezetimibe 10 mg oral tablet  -- 1 tab(s) by mouth once a day (at bedtime)    metoprolol tartrate 100 mg oral tablet  -- 1 tab(s) by mouth every 12 hours    Lopressor 50 mg oral tablet  -- 1 tab(s) by mouth 2 times a day    Zetia 10 mg oral tablet  -- 1 tab(s) by mouth once a day    Synthroid 75 mcg (0.075 mg) oral tablet  -- 1 tab(s) by mouth once a day    LORazepam 0.5 mg oral tablet  -- 1 tab(s) by mouth once a day, As Needed    Coumadin 6 mg oral tablet  -- 1 tab(s) by mouth once a day I will STOP taking the medications listed below when I get home from the hospital:    Coumadin 3 mg oral tablet  -- 3.0 mg every other day and 4.5 mg every other day    ezetimibe 10 mg oral tablet  -- 1 tab(s) by mouth once a day (at bedtime)    metoprolol tartrate 100 mg oral tablet  -- 1 tab(s) by mouth every 12 hours    Lopressor 50 mg oral tablet  -- 1 tab(s) by mouth 2 times a day    Zetia 10 mg oral tablet  -- 1 tab(s) by mouth once a day    LORazepam 0.5 mg oral tablet  -- 1 tab(s) by mouth once a day, As Needed    Coumadin 6 mg oral tablet  -- 1 tab(s) by mouth once a day

## 2017-10-20 NOTE — DIETITIAN INITIAL EVALUATION ADULT. - PROBLEM SELECTOR PLAN 2
-Hold Coumadin  -No vitamin K or FFP for now  -Intermittent pneumatic compression for DVT ppx  -Fecal occult blood test x 2 ordered   -Trend coag labs

## 2017-10-20 NOTE — DISCHARGE NOTE ADULT - MEDICATION SUMMARY - MEDICATIONS TO TAKE
I will START or STAY ON the medications listed below when I get home from the hospital:    aspirin 81 mg oral tablet, chewable  -- 1 tab(s) by mouth once a day  -- Indication: For Elevated troponin    Coumadin 3 mg oral tablet  -- 3.0 mg every other day and 4.5 mg every other day  -- Indication: For Mitral Valve Prolapse    levETIRAcetam 250 mg oral tablet, dispersible  -- 1 tab(s) by mouth 2 times a day  -- Indication: For Seizure    LORazepam 0.5 mg oral tablet  -- 1 tab(s) by mouth 2 times a day, As needed, Agitation  -- Indication: For Seizure    carvedilol 6.25 mg oral tablet  -- 1 tab(s) by mouth every 12 hours  -- Indication: For HTN (hypertension)    donepezil 10 mg oral tablet  -- 1 tab(s) by mouth once a day (at bedtime)  -- Indication: For Dementia    memantine 10 mg oral tablet  -- 1 tab(s) by mouth 2 times a day  -- Indication: For Dementia    NexIUM 40 mg oral delayed release capsule  -- 1 cap(s) by mouth once a day  -- Indication: For Prophylactic measure    levothyroxine 75 mcg (0.075 mg) oral tablet  -- 1 tab(s) by mouth once a day  -- Indication: For Hypothyroid I will START or STAY ON the medications listed below when I get home from the hospital:    aspirin 81 mg oral tablet, chewable  -- 1 tab(s) by mouth once a day  -- Indication: For Elevated troponin    warfarin 5 mg oral tablet  -- 5  orally  -- Indication: For Mitral Valve Prolapse    levETIRAcetam 250 mg oral tablet, dispersible  -- 1 tab(s) by mouth 2 times a day  -- Indication: For Seizure    LORazepam 0.5 mg oral tablet  -- 1 tab(s) by mouth 2 times a day, As needed, Agitation  -- Indication: For Seizure    carvedilol 6.25 mg oral tablet  -- 1 tab(s) by mouth every 12 hours  -- Indication: For HTN (hypertension)    donepezil 10 mg oral tablet  -- 1 tab(s) by mouth once a day (at bedtime)  -- Indication: For Dementia    memantine 10 mg oral tablet  -- 1 tab(s) by mouth 2 times a day  -- Indication: For Dementia    NexIUM 40 mg oral delayed release capsule  -- 1 cap(s) by mouth once a day  -- Indication: For Prophylactic measure    levothyroxine 75 mcg (0.075 mg) oral tablet  -- 1 tab(s) by mouth once a day  -- Indication: For Hypothyroid

## 2017-10-20 NOTE — DISCHARGE NOTE ADULT - INSTRUCTIONS
Dysphagia 1 PUREE with NECTAR thick liquids for swallow safety and efficiency  allow for swallow between intakes; alternate food with liquid; check mouth frequently for oral residue/pocketing; maintain upright posture during/after eating for 30 mins; no straws; oral hygiene; position upright (90 degrees); small sips/bites

## 2017-10-20 NOTE — DISCHARGE NOTE ADULT - HOSPITAL COURSE
83 y/o female with PMHx dementia, mitral valve prolapse, PSHx of lumpectomy for breast cancer presents after witnessed seizure by . No seizures reported or observed. EEG- unable to obtain due to behavioral aggressiveness. Lab results noted: slightly elevated CPK; supra-therapeutic INR, troponin also elevated but probably non-cardiac. CTH-1)  chronic ischemic changes in both hemispheres throughout the white matter. Moderate atrophy. No acute abnormality suggested. 2)  no hemorrhage or mass..cxr- No active disease. No change. seen by cardiology- Breanne mildly positive; EKG with inferior and inferolateral TWIs and mild ST depressions.  +trops likely 2/2 combination of a CNS events in the setting of renal insufficiency, with likely underlying CAD. 2D echo pending. on asa/coreg. not a candidate at this time for any invasive cardiac/ischemic evaluation; supportive care for now. PT eval pending 85 y/o female with PMHx dementia, mitral valve prolapse, PSHx of lumpectomy for breast cancer presents after witnessed seizure by . No seizures reported or observed. EEG- unable to obtain due to behavioral aggressiveness. Lab results noted: slightly elevated CPK; supra-therapeutic INR, troponin also elevated but probably non-cardiac. CTH-1)  chronic ischemic changes in both hemispheres throughout the white matter. Moderate atrophy. No acute abnormality suggested. 2)  no hemorrhage or mass..cxr- No active disease. No change. seen by cardiology- Breanne mildly positive; EKG with inferior and inferolateral TWIs and mild ST depressions.  +trops likely 2/2 combination of a CNS events in the setting of renal insufficiency, with likely underlying CAD. on asa/coreg. not a candidate at this time for any invasive cardiac/ischemic evaluation; supportive care for now. 85 y/o female with PMHx dementia, mitral valve prolapse, PSHx of lumpectomy for breast cancer presents after witnessed seizure by . No seizures reported or observed. Head CT was done and did not show any acute changes. Neurology was consulted and followed the patient while inpatient. A EEG was done and the results are pending. The patient was loaded with Keppra along with maintenance dose.  She has been seizure free since. The patient will follow up with Neurology as outpatient with possible elective MRI brain.    On initial workup the patient did have positive Cardiac enzymes and chronic ischemic EKG changes. CXR was done and did not show acute changes. Cardiology was consulted. The patient did have acute renal failure on admission. Impression was elevated troponin in setting of renal failure and most likely CAD. Pt is on ASA, Coreg and Coumadin.  Echo was done and showed severe Aortic Stenosis and diastolic CHF grade 1, EF 55-60%. As per Cardiology the patient is a poor candidate for invasive cardiac ischemic workup.     The patient was evaluated with speech and swallowing. Recommendation based on evaluation was puree diet with nectar consistency liquid.     The patient had generalized weakness as well. Rehab was consulted and recommended short-term Rehab when the patient is medically stable.

## 2017-10-20 NOTE — DISCHARGE NOTE ADULT - CARE PROVIDER_API CALL
Flex Oconnor (MD), Neurology  [address] [place] [address]  Phone: [phone]  Fax: [phone] Flex Oconnor), Neurology  [address] [facility]  [address]  [place] [address]  Phone: [phone]  Fax: [phone]    Enrrique Ruiz), Cardiology; Internal Medicine; Interventional Cardiology  [address] [place] [address]  Phone: [phone]  Fax: [phone]

## 2017-10-20 NOTE — PROGRESS NOTE ADULT - PROBLEM SELECTOR PLAN 3
-IVF, monitor bmp and electrolytes -Dysphagia, puree diet  -SLP eval pending  -aspiration precaution

## 2017-10-20 NOTE — DISCHARGE NOTE ADULT - CARE PLAN
Principal Discharge DX:	Seizure disorder  Goal:	no further seizure like activities  Instructions for follow-up, activity and diet:	started on keppra. follow up with neurology  Secondary Diagnosis:	DORY (acute kidney injury)  Goal:	resolving  Instructions for follow-up, activity and diet:	follow up with pmd to monitor bmp  Secondary Diagnosis:	Elevated troponin  Instructions for follow-up, activity and diet:	Breanne mildly positive; EKG with inferior and inferolateral TWIs and mild ST depressions.  +trops likely 2/2 combination of a CNS events in the setting of renal insufficiency, with likely underlying CAD. on asa/coreg. not a candidate at this time for any invasive cardiac/ischemic evaluation; supportive care for now.  Secondary Diagnosis:	Acquired hypothyroidism  Instructions for follow-up, activity and diet:	cont on home meds  Secondary Diagnosis:	Mitral Valve Prolapse  Secondary Diagnosis:	Alzheimer's dementia with behavioral disturbance, unspecified timing of dementia onset  Instructions for follow-up, activity and diet:	supportive care Principal Discharge DX:	Seizure disorder  Goal:	no further seizure like activities  Instructions for follow-up, activity and diet:	started on keppra. follow up with neurology  Secondary Diagnosis:	DORY (acute kidney injury)  Goal:	resolving  Instructions for follow-up, activity and diet:	follow up with pmd to monitor bmp  Secondary Diagnosis:	Elevated troponin  Instructions for follow-up, activity and diet:	Breanne mildly positive; EKG with inferior and inferolateral TWIs and mild ST depressions.  +trops likely 2/2 combination of a CNS events in the setting of renal insufficiency, with likely underlying CAD. on asa/coreg. not a candidate at this time for any invasive cardiac/ischemic evaluation; supportive care for now.  Secondary Diagnosis:	Acquired hypothyroidism  Instructions for follow-up, activity and diet:	cont on home meds  Secondary Diagnosis:	Mitral Valve Prolapse  Instructions for follow-up, activity and diet:	cont on coumadin as home dose and follow up with pmd for INR monitoring  Secondary Diagnosis:	Alzheimer's dementia with behavioral disturbance, unspecified timing of dementia onset  Instructions for follow-up, activity and diet:	supportive care Principal Discharge DX:	Seizure disorder  Goal:	no further seizure like activities  Instructions for follow-up, activity and diet:	will continue with maintenance Keppra daily. EEG was done and results are pending. Pt is to follow up with neurology as outpatient with possible elective MRI.  Secondary Diagnosis:	DORY (acute kidney injury)  Goal:	resolved  Instructions for follow-up, activity and diet:	The patient renal function has stabilized, she most likely has Chronic kidney disease stage III as a baseline.  Secondary Diagnosis:	Elevated troponin  Instructions for follow-up, activity and diet:	Will continue with asa/coreg. not a candidate at this time for any invasive cardiac/ischemic evaluation; supportive care for now.  Secondary Diagnosis:	Acquired hypothyroidism  Instructions for follow-up, activity and diet:	cont on home meds  Secondary Diagnosis:	Mitral Valve Prolapse  Instructions for follow-up, activity and diet:	moderate Aortic stenosis as well with diastolic CHF, will Continue with current medications, cont daily coumadin along with daily INR monitoring. follow up with cardiology in 2 weeks.  Secondary Diagnosis:	Alzheimer's dementia with behavioral disturbance, unspecified timing of dementia onset  Instructions for follow-up, activity and diet:	supportive care  Secondary Diagnosis:	Weakness  Goal:	Improvement with ambulation  Instructions for follow-up, activity and diet:	Rehab has recommended Subacute Rehab on discharge.

## 2017-10-20 NOTE — DIETITIAN INITIAL EVALUATION ADULT. - PERTINENT LABORATORY DATA
10-20 Na141 mmol/L Glu 66 mg/dL<L> K+ 3.7 mmol/L Cr  1.12 mg/dL BUN 9 mg/dL Est GFR (AA)52 mL/min/1.73M2<L>  Phos n/a   Ca 8.1 mg/dL<L> Hgb 13.2 g/dL Hct 41.1 % 10-19 Alb 3.0 g/dL <L> Glucose 130 mg/dL<H> Cr 1.74 mg/dL<H>

## 2017-10-20 NOTE — DIETITIAN INITIAL EVALUATION ADULT. - NS AS NUTRI INTERV MEDICAL AND FOOD SUPPLEMENTS
Recommend Ensure Enlive 2 x day=750 calories, 40 grams protein/Commercial beverage Recommend Ensure Pudding 4 oz 3x/day (510 arthur, 12 gm pro)/Commercial food

## 2017-10-20 NOTE — DIETITIAN INITIAL EVALUATION ADULT. - PROBLEM SELECTOR PLAN 1
-Admit patient to telemetry, bedside telemetry, continuous pulse ox  -Neurology consult: Dr. Oconnor  -Keppra 500 IV given by ED. Another Keppra 500 IV ordered  -Keppra 250 IV BID  -EEG ordered  -Possible MRI per neurology  -Seizure precautions  -IVF  -NPO for now. Dysphagia evaluation in AM  -Pain management prn  -Anti-emetic prn  -GI ppx with Protonix   -DVT ppx with intermittent pneumatic compression  -Discussed with Dr. Grajeda

## 2017-10-20 NOTE — DISCHARGE NOTE ADULT - MEDICATION SUMMARY - MEDICATIONS TO CHANGE
I will SWITCH the dose or number of times a day I take the medications listed below when I get home from the hospital:    Namenda XR 28 mg oral capsule, extended release  -- 1 cap(s) by mouth once a day

## 2017-10-21 DIAGNOSIS — E44.0 MODERATE PROTEIN-CALORIE MALNUTRITION: ICD-10-CM

## 2017-10-21 LAB
ANION GAP SERPL CALC-SCNC: 11 MMOL/L — SIGNIFICANT CHANGE UP (ref 5–17)
BUN SERPL-MCNC: 10 MG/DL — SIGNIFICANT CHANGE UP (ref 7–23)
CALCIUM SERPL-MCNC: 8.5 MG/DL — SIGNIFICANT CHANGE UP (ref 8.5–10.1)
CHLORIDE SERPL-SCNC: 106 MMOL/L — SIGNIFICANT CHANGE UP (ref 96–108)
CO2 SERPL-SCNC: 22 MMOL/L — SIGNIFICANT CHANGE UP (ref 22–31)
CREAT SERPL-MCNC: 1.24 MG/DL — SIGNIFICANT CHANGE UP (ref 0.5–1.3)
GLUCOSE SERPL-MCNC: 90 MG/DL — SIGNIFICANT CHANGE UP (ref 70–99)
HCT VFR BLD CALC: 44.8 % — SIGNIFICANT CHANGE UP (ref 34.5–45)
HGB BLD-MCNC: 14.5 G/DL — SIGNIFICANT CHANGE UP (ref 11.5–15.5)
INR BLD: 1.31 RATIO — HIGH (ref 0.88–1.16)
MCHC RBC-ENTMCNC: 30.9 PG — SIGNIFICANT CHANGE UP (ref 27–34)
MCHC RBC-ENTMCNC: 32.4 GM/DL — SIGNIFICANT CHANGE UP (ref 32–36)
MCV RBC AUTO: 95.5 FL — SIGNIFICANT CHANGE UP (ref 80–100)
PLATELET # BLD AUTO: 182 K/UL — SIGNIFICANT CHANGE UP (ref 150–400)
POTASSIUM SERPL-MCNC: 3.8 MMOL/L — SIGNIFICANT CHANGE UP (ref 3.5–5.3)
POTASSIUM SERPL-SCNC: 3.8 MMOL/L — SIGNIFICANT CHANGE UP (ref 3.5–5.3)
PROTHROM AB SERPL-ACNC: 14.4 SEC — HIGH (ref 9.8–12.7)
RBC # BLD: 4.69 M/UL — SIGNIFICANT CHANGE UP (ref 3.8–5.2)
RBC # FLD: 14.8 % — SIGNIFICANT CHANGE UP (ref 11–15)
SODIUM SERPL-SCNC: 139 MMOL/L — SIGNIFICANT CHANGE UP (ref 135–145)
WBC # BLD: 6 K/UL — SIGNIFICANT CHANGE UP (ref 3.8–10.5)
WBC # FLD AUTO: 6 K/UL — SIGNIFICANT CHANGE UP (ref 3.8–10.5)

## 2017-10-21 PROCEDURE — 99233 SBSQ HOSP IP/OBS HIGH 50: CPT

## 2017-10-21 RX ORDER — DONEPEZIL HYDROCHLORIDE 10 MG/1
1 TABLET, FILM COATED ORAL
Qty: 0 | Refills: 0 | COMMUNITY
Start: 2017-10-21

## 2017-10-21 RX ORDER — WARFARIN SODIUM 2.5 MG/1
5 TABLET ORAL ONCE
Qty: 0 | Refills: 0 | Status: COMPLETED | OUTPATIENT
Start: 2017-10-21 | End: 2017-10-21

## 2017-10-21 RX ORDER — ASPIRIN/CALCIUM CARB/MAGNESIUM 324 MG
1 TABLET ORAL
Qty: 0 | Refills: 0 | COMMUNITY
Start: 2017-10-21

## 2017-10-21 RX ORDER — CARVEDILOL PHOSPHATE 80 MG/1
1 CAPSULE, EXTENDED RELEASE ORAL
Qty: 0 | Refills: 0 | COMMUNITY
Start: 2017-10-21

## 2017-10-21 RX ORDER — SODIUM CHLORIDE 9 MG/ML
1000 INJECTION INTRAMUSCULAR; INTRAVENOUS; SUBCUTANEOUS
Qty: 0 | Refills: 0 | Status: DISCONTINUED | OUTPATIENT
Start: 2017-10-21 | End: 2017-10-24

## 2017-10-21 RX ORDER — LEVETIRACETAM 250 MG/1
1 TABLET, FILM COATED ORAL
Qty: 0 | Refills: 0 | COMMUNITY
Start: 2017-10-21

## 2017-10-21 RX ORDER — MEMANTINE HYDROCHLORIDE 10 MG/1
1 TABLET ORAL
Qty: 0 | Refills: 0 | COMMUNITY
Start: 2017-10-21

## 2017-10-21 RX ORDER — LEVOTHYROXINE SODIUM 125 MCG
1 TABLET ORAL
Qty: 0 | Refills: 0 | COMMUNITY
Start: 2017-10-21

## 2017-10-21 RX ADMIN — DONEPEZIL HYDROCHLORIDE 10 MILLIGRAM(S): 10 TABLET, FILM COATED ORAL at 21:29

## 2017-10-21 RX ADMIN — CARVEDILOL PHOSPHATE 6.25 MILLIGRAM(S): 80 CAPSULE, EXTENDED RELEASE ORAL at 18:03

## 2017-10-21 RX ADMIN — LEVETIRACETAM 400 MILLIGRAM(S): 250 TABLET, FILM COATED ORAL at 18:03

## 2017-10-21 RX ADMIN — SODIUM CHLORIDE 60 MILLILITER(S): 9 INJECTION INTRAMUSCULAR; INTRAVENOUS; SUBCUTANEOUS at 18:03

## 2017-10-21 RX ADMIN — Medication 81 MILLIGRAM(S): at 11:24

## 2017-10-21 RX ADMIN — LEVETIRACETAM 400 MILLIGRAM(S): 250 TABLET, FILM COATED ORAL at 05:39

## 2017-10-21 RX ADMIN — PANTOPRAZOLE SODIUM 40 MILLIGRAM(S): 20 TABLET, DELAYED RELEASE ORAL at 11:24

## 2017-10-21 RX ADMIN — Medication 75 MICROGRAM(S): at 05:39

## 2017-10-21 RX ADMIN — CARVEDILOL PHOSPHATE 6.25 MILLIGRAM(S): 80 CAPSULE, EXTENDED RELEASE ORAL at 05:39

## 2017-10-21 RX ADMIN — MEMANTINE HYDROCHLORIDE 10 MILLIGRAM(S): 10 TABLET ORAL at 05:39

## 2017-10-21 RX ADMIN — WARFARIN SODIUM 5 MILLIGRAM(S): 2.5 TABLET ORAL at 21:29

## 2017-10-21 RX ADMIN — MEMANTINE HYDROCHLORIDE 10 MILLIGRAM(S): 10 TABLET ORAL at 18:03

## 2017-10-21 NOTE — SWALLOW BEDSIDE ASSESSMENT ADULT - SLP GENERAL OBSERVATIONS
Pt was seen at bedside alert but oriented to name only; she was minimally verbal and followed directions poorly; Pt appeared generally weakened state; she did accept trial bolus willingly and cooperated for exam.

## 2017-10-21 NOTE — PROGRESS NOTE ADULT - PROBLEM SELECTOR PLAN 4
-Low sodium , Dysphagia 1 Pureed- Honey Consistency Fluids , aspiration precautions , Ensure Pudding 4 oz 3x/day (510 arthur, 12 gm pro)

## 2017-10-21 NOTE — SWALLOW BEDSIDE ASSESSMENT ADULT - NS SPL SWALLOW CLINIC TRIAL FT
thin liquids appeared functional however sec mental status and weakness there is risk for aspiration at this time.

## 2017-10-21 NOTE — SWALLOW BEDSIDE ASSESSMENT ADULT - SWALLOW EVAL: RECOMMENDED FEEDING/EATING TECHNIQUES
oral hygiene/position upright (90 degrees)/small sips/bites/allow for swallow between intakes/alternate food with liquid/check mouth frequently for oral residue/pocketing/maintain upright posture during/after eating for 30 mins/no straws

## 2017-10-21 NOTE — SWALLOW BEDSIDE ASSESSMENT ADULT - SWALLOW EVAL: DIAGNOSIS
Pt is an 84 yr old female with dementia and mult medical hx; Pt presents symptoms consistent with oropharyngeal dysphagia and related dementia feeding behaviors; awareness of eating contexts appears reduced and ability to follow specific strategies is impaired; minimal throat clear symptoms were evident given thin liquids.

## 2017-10-22 LAB
HCT VFR BLD CALC: 47.4 % — HIGH (ref 34.5–45)
HGB BLD-MCNC: 15.2 G/DL — SIGNIFICANT CHANGE UP (ref 11.5–15.5)
INR BLD: 1.47 RATIO — HIGH (ref 0.88–1.16)
MCHC RBC-ENTMCNC: 30.5 PG — SIGNIFICANT CHANGE UP (ref 27–34)
MCHC RBC-ENTMCNC: 31.9 GM/DL — LOW (ref 32–36)
MCV RBC AUTO: 95.4 FL — SIGNIFICANT CHANGE UP (ref 80–100)
PLATELET # BLD AUTO: 169 K/UL — SIGNIFICANT CHANGE UP (ref 150–400)
PROTHROM AB SERPL-ACNC: 16.2 SEC — HIGH (ref 9.8–12.7)
RBC # BLD: 4.98 M/UL — SIGNIFICANT CHANGE UP (ref 3.8–5.2)
RBC # FLD: 15 % — SIGNIFICANT CHANGE UP (ref 11–15)
WBC # BLD: 5.9 K/UL — SIGNIFICANT CHANGE UP (ref 3.8–10.5)
WBC # FLD AUTO: 5.9 K/UL — SIGNIFICANT CHANGE UP (ref 3.8–10.5)

## 2017-10-22 PROCEDURE — 99233 SBSQ HOSP IP/OBS HIGH 50: CPT

## 2017-10-22 RX ORDER — WARFARIN SODIUM 2.5 MG/1
5 TABLET ORAL ONCE
Qty: 0 | Refills: 0 | Status: COMPLETED | OUTPATIENT
Start: 2017-10-22 | End: 2017-10-22

## 2017-10-22 RX ADMIN — MEMANTINE HYDROCHLORIDE 10 MILLIGRAM(S): 10 TABLET ORAL at 05:51

## 2017-10-22 RX ADMIN — CARVEDILOL PHOSPHATE 6.25 MILLIGRAM(S): 80 CAPSULE, EXTENDED RELEASE ORAL at 05:51

## 2017-10-22 RX ADMIN — SODIUM CHLORIDE 60 MILLILITER(S): 9 INJECTION INTRAMUSCULAR; INTRAVENOUS; SUBCUTANEOUS at 17:48

## 2017-10-22 RX ADMIN — Medication 81 MILLIGRAM(S): at 12:45

## 2017-10-22 RX ADMIN — Medication 75 MICROGRAM(S): at 05:51

## 2017-10-22 RX ADMIN — MEMANTINE HYDROCHLORIDE 10 MILLIGRAM(S): 10 TABLET ORAL at 17:37

## 2017-10-22 RX ADMIN — PANTOPRAZOLE SODIUM 40 MILLIGRAM(S): 20 TABLET, DELAYED RELEASE ORAL at 12:45

## 2017-10-22 RX ADMIN — LEVETIRACETAM 400 MILLIGRAM(S): 250 TABLET, FILM COATED ORAL at 17:47

## 2017-10-22 RX ADMIN — WARFARIN SODIUM 5 MILLIGRAM(S): 2.5 TABLET ORAL at 22:24

## 2017-10-22 RX ADMIN — CARVEDILOL PHOSPHATE 6.25 MILLIGRAM(S): 80 CAPSULE, EXTENDED RELEASE ORAL at 17:38

## 2017-10-22 RX ADMIN — DONEPEZIL HYDROCHLORIDE 10 MILLIGRAM(S): 10 TABLET, FILM COATED ORAL at 22:24

## 2017-10-22 RX ADMIN — LEVETIRACETAM 400 MILLIGRAM(S): 250 TABLET, FILM COATED ORAL at 05:51

## 2017-10-22 NOTE — PROGRESS NOTE ADULT - PROBLEM SELECTOR PLAN 3
-Dysphagia 1 PUREE with NECTAR thick liquids for swallow safety and efficiency  -aspiration precaution

## 2017-10-22 NOTE — PROGRESS NOTE ADULT - ASSESSMENT
83 y/o female with PMHx dementia, mitral valve prolapse, PSHx of lumpectomy for breast cancer presents after witnessed seizure by . No seizures reported or observed. EEG- unable to obtain due to behavioral aggressiveness. Lab results noted: slightly elevated CPK; supra-therapeutic INR, troponin also elevated but probably non-cardiac. CTH-1)  chronic ischemic changes in both hemispheres throughout the white matter. Moderate atrophy. No acute abnormality suggested. 2)  no hemorrhage or mass..cxr- No active disease. No change. seen by cardiology- Breanne mildly positive; EKG with inferior and inferolateral TWIs and mild ST depressions.  +trops likely 2/2 combination of a CNS events in the setting of renal insufficiency, with likely underlying CAD. 2D echo pending. on asa/coreg. not a candidate at this time for any invasive cardiac/ischemic evaluation; supportive care for now. PT eval pending
83 y/o female with PMHx dementia, mitral valve prolapse, PSHx of lumpectomy for breast cancer presents after witnessed seizure by . No seizures reported or observed. EEG- unable to obtain due to behavioral aggressiveness. Lab results noted: slightly elevated CPK; supra-therapeutic INR, troponin also elevated but probably non-cardiac. CTH-1)  chronic ischemic changes in both hemispheres throughout the white matter. Moderate atrophy. No acute abnormality suggested. 2)  no hemorrhage or mass..cxr- No active disease. No change. seen by cardiology- Breanne mildly positive; EKG with inferior and inferolateral TWIs and mild ST depressions.  +trops likely 2/2 combination of a CNS events in the setting of renal insufficiency, with likely underlying CAD. 2D echo pending. on asa/coreg. not a candidate at this time for any invasive cardiac/ischemic evaluation; supportive care for now. PT eval pending
85 y/o female with PMHx dementia, mitral valve prolapse, PSHx of lumpectomy for breast cancer presents after witnessed seizure by . No seizures reported or observed.  EEG ordered.  Lab results noted: slightly elevated CPK; supra-therapeutic INR, troponin also elevated but probably non-cardiac. cth-1)  chronic ischemic changes in both hemispheres throughout the white matter. Moderate atrophy. No acute abnormality suggested.2)  no hemorrhage or mass..cxr- No active disease. No change.
85 y/o female with PMHx dementia, mitral valve prolapse, PSHx of lumpectomy for breast cancer presents after witnessed seizure by . No seizures reported or observed. EEG- unable to obtain due to behavioral aggressiveness. Lab results noted: slightly elevated CPK; supra-therapeutic INR, troponin also elevated but probably non-cardiac. CTH-1)  chronic ischemic changes in both hemispheres throughout the white matter. Moderate atrophy. No acute abnormality suggested. 2)  no hemorrhage or mass..cxr- No active disease. No change. seen by cardiology- Breanne mildly positive; EKG with inferior and inferolateral TWIs and mild ST depressions.  +trops likely 2/2 combination of a CNS events in the setting of renal insufficiency, with likely underlying CAD. 2D echo pending. on asa/coreg. not a candidate at this time for any invasive cardiac/ischemic evaluation; supportive care for now. PT eval pending

## 2017-10-22 NOTE — PROGRESS NOTE ADULT - PROBLEM SELECTOR PLAN 2
-Cardiology recs noted  -supportive care  -TTE pending
-Cardiology recs noted  -supportive care  -TTE pending
-Hold Coumadin  -No vitamin K for now  -Intermittent pneumatic compression for DVT ppx  -Fecal occult blood test x 2 ordered   -Trend coag labs
-Cardiology recs noted  -supportive care  -TTE pending

## 2017-10-22 NOTE — PROGRESS NOTE ADULT - PROBLEM SELECTOR PLAN 8
-resume Coumadin 3mg   -Fecal occult blood test x 2 ordered   -monitor INR, monitor for bleeding
-Coumadin 5mg   -Fecal occult blood test x 2 ordered   -monitor INR, monitor for bleeding
-Home medication
-levothyroxine

## 2017-10-22 NOTE — PROGRESS NOTE ADULT - PROBLEM SELECTOR PLAN 1
-levETIRAcetam  IVPB 250 milliGRAM(s) IV Intermittent every 12 hours  -Fall/seizure and aspiration precaution  -neurology is following up
-levETIRAcetam  IVPB 250 milliGRAM(s) IV Intermittent every 12 hours  -Fall/seizure and aspiration precaution  -neurology is following up  -EEG pending
-levETIRAcetam  IVPB 250 milliGRAM(s) IV Intermittent every 12 hours  -Fall/seizure and aspiration precaution  -neurology is following up  -EEG pending
-levETIRAcetam  IVPB 250 milliGRAM(s) IV Intermittent every 12 hours  -Fall/seizure and aspiration precaution  -neurology is following up

## 2017-10-22 NOTE — PROGRESS NOTE ADULT - PROBLEM/PLAN-4
Left second message for the patient to call us back   
DISPLAY PLAN FREE TEXT

## 2017-10-23 LAB
ANION GAP SERPL CALC-SCNC: 10 MMOL/L — SIGNIFICANT CHANGE UP (ref 5–17)
BUN SERPL-MCNC: 13 MG/DL — SIGNIFICANT CHANGE UP (ref 7–23)
CALCIUM SERPL-MCNC: 8.6 MG/DL — SIGNIFICANT CHANGE UP (ref 8.5–10.1)
CHLORIDE SERPL-SCNC: 109 MMOL/L — HIGH (ref 96–108)
CK SERPL-CCNC: 160 U/L — SIGNIFICANT CHANGE UP (ref 26–192)
CO2 SERPL-SCNC: 22 MMOL/L — SIGNIFICANT CHANGE UP (ref 22–31)
CREAT SERPL-MCNC: 1.22 MG/DL — SIGNIFICANT CHANGE UP (ref 0.5–1.3)
GLUCOSE SERPL-MCNC: 75 MG/DL — SIGNIFICANT CHANGE UP (ref 70–99)
HCT VFR BLD CALC: 42.8 % — SIGNIFICANT CHANGE UP (ref 34.5–45)
HGB BLD-MCNC: 14 G/DL — SIGNIFICANT CHANGE UP (ref 11.5–15.5)
INR BLD: 1.76 RATIO — HIGH (ref 0.88–1.16)
MCHC RBC-ENTMCNC: 31.1 PG — SIGNIFICANT CHANGE UP (ref 27–34)
MCHC RBC-ENTMCNC: 32.7 GM/DL — SIGNIFICANT CHANGE UP (ref 32–36)
MCV RBC AUTO: 95 FL — SIGNIFICANT CHANGE UP (ref 80–100)
PLATELET # BLD AUTO: 191 K/UL — SIGNIFICANT CHANGE UP (ref 150–400)
POTASSIUM SERPL-MCNC: 4.1 MMOL/L — SIGNIFICANT CHANGE UP (ref 3.5–5.3)
POTASSIUM SERPL-SCNC: 4.1 MMOL/L — SIGNIFICANT CHANGE UP (ref 3.5–5.3)
PROTHROM AB SERPL-ACNC: 19.4 SEC — HIGH (ref 9.8–12.7)
RBC # BLD: 4.51 M/UL — SIGNIFICANT CHANGE UP (ref 3.8–5.2)
RBC # FLD: 15.1 % — HIGH (ref 11–15)
SODIUM SERPL-SCNC: 141 MMOL/L — SIGNIFICANT CHANGE UP (ref 135–145)
WBC # BLD: 6.3 K/UL — SIGNIFICANT CHANGE UP (ref 3.8–10.5)
WBC # FLD AUTO: 6.3 K/UL — SIGNIFICANT CHANGE UP (ref 3.8–10.5)

## 2017-10-23 PROCEDURE — 99232 SBSQ HOSP IP/OBS MODERATE 35: CPT

## 2017-10-23 RX ORDER — WARFARIN SODIUM 2.5 MG/1
5 TABLET ORAL ONCE
Qty: 0 | Refills: 0 | Status: COMPLETED | OUTPATIENT
Start: 2017-10-23 | End: 2017-10-23

## 2017-10-23 RX ORDER — WARFARIN SODIUM 2.5 MG/1
5 TABLET ORAL
Qty: 0 | Refills: 0 | COMMUNITY
Start: 2017-10-23

## 2017-10-23 RX ADMIN — PANTOPRAZOLE SODIUM 40 MILLIGRAM(S): 20 TABLET, DELAYED RELEASE ORAL at 15:37

## 2017-10-23 RX ADMIN — MEMANTINE HYDROCHLORIDE 10 MILLIGRAM(S): 10 TABLET ORAL at 18:06

## 2017-10-23 RX ADMIN — LEVETIRACETAM 400 MILLIGRAM(S): 250 TABLET, FILM COATED ORAL at 05:19

## 2017-10-23 RX ADMIN — LEVETIRACETAM 400 MILLIGRAM(S): 250 TABLET, FILM COATED ORAL at 18:06

## 2017-10-23 RX ADMIN — Medication 75 MICROGRAM(S): at 05:20

## 2017-10-23 RX ADMIN — SODIUM CHLORIDE 60 MILLILITER(S): 9 INJECTION INTRAMUSCULAR; INTRAVENOUS; SUBCUTANEOUS at 18:06

## 2017-10-23 RX ADMIN — CARVEDILOL PHOSPHATE 6.25 MILLIGRAM(S): 80 CAPSULE, EXTENDED RELEASE ORAL at 05:20

## 2017-10-23 RX ADMIN — WARFARIN SODIUM 5 MILLIGRAM(S): 2.5 TABLET ORAL at 21:31

## 2017-10-23 RX ADMIN — MEMANTINE HYDROCHLORIDE 10 MILLIGRAM(S): 10 TABLET ORAL at 05:20

## 2017-10-23 RX ADMIN — CARVEDILOL PHOSPHATE 6.25 MILLIGRAM(S): 80 CAPSULE, EXTENDED RELEASE ORAL at 18:06

## 2017-10-23 RX ADMIN — Medication 81 MILLIGRAM(S): at 15:37

## 2017-10-23 RX ADMIN — DONEPEZIL HYDROCHLORIDE 10 MILLIGRAM(S): 10 TABLET, FILM COATED ORAL at 21:31

## 2017-10-23 NOTE — PHYSICAL THERAPY INITIAL EVALUATION ADULT - MODIFIED CLINICAL TEST OF SENSORY INTEGRATION IN BALANCE TEST
Barthel Index: Feeding Score __5_, Bathing Score _0__, Grooming Score _0__, Dressing Score _0__, Bowels Score _5__, Bladder Score _5__, Toilet Score _0__, Transfers Score _10__, Mobility Score __0_, Stairs Score _0__,     Total Score __25_

## 2017-10-23 NOTE — PHYSICAL THERAPY INITIAL EVALUATION ADULT - ADDITIONAL COMMENTS
Patient lives a private house with 3 steps to enter and then 9 more steps to the second floor. Patient required assistance with all ADL's from her spouse prior to admission.

## 2017-10-23 NOTE — PHYSICAL THERAPY INITIAL EVALUATION ADULT - CRITERIA FOR SKILLED THERAPEUTIC INTERVENTIONS
risk reduction/prevention/therapy frequency/Subacute Rehab/predicted duration of therapy intervention/functional limitations in following categories/anticipated discharge recommendation/rehab potential/impairments found

## 2017-10-23 NOTE — PHYSICAL THERAPY INITIAL EVALUATION ADULT - PLANNED THERAPY INTERVENTIONS, PT EVAL
bed mobility training/gait training/neuromuscular re-education/strengthening/transfer training/balance training

## 2017-10-24 VITALS
HEART RATE: 112 BPM | SYSTOLIC BLOOD PRESSURE: 117 MMHG | TEMPERATURE: 98 F | RESPIRATION RATE: 16 BRPM | DIASTOLIC BLOOD PRESSURE: 70 MMHG | OXYGEN SATURATION: 100 %

## 2017-10-24 PROCEDURE — 99239 HOSP IP/OBS DSCHRG MGMT >30: CPT

## 2017-10-24 RX ADMIN — PANTOPRAZOLE SODIUM 40 MILLIGRAM(S): 20 TABLET, DELAYED RELEASE ORAL at 11:33

## 2017-10-24 RX ADMIN — MEMANTINE HYDROCHLORIDE 10 MILLIGRAM(S): 10 TABLET ORAL at 17:17

## 2017-10-24 RX ADMIN — SODIUM CHLORIDE 60 MILLILITER(S): 9 INJECTION INTRAMUSCULAR; INTRAVENOUS; SUBCUTANEOUS at 11:33

## 2017-10-24 RX ADMIN — Medication 75 MICROGRAM(S): at 05:17

## 2017-10-24 RX ADMIN — CARVEDILOL PHOSPHATE 6.25 MILLIGRAM(S): 80 CAPSULE, EXTENDED RELEASE ORAL at 05:18

## 2017-10-24 RX ADMIN — MEMANTINE HYDROCHLORIDE 10 MILLIGRAM(S): 10 TABLET ORAL at 05:18

## 2017-10-24 RX ADMIN — Medication 81 MILLIGRAM(S): at 11:33

## 2017-10-24 RX ADMIN — LEVETIRACETAM 400 MILLIGRAM(S): 250 TABLET, FILM COATED ORAL at 05:47

## 2017-10-24 RX ADMIN — CARVEDILOL PHOSPHATE 6.25 MILLIGRAM(S): 80 CAPSULE, EXTENDED RELEASE ORAL at 17:17

## 2017-10-24 RX ADMIN — LEVETIRACETAM 400 MILLIGRAM(S): 250 TABLET, FILM COATED ORAL at 17:17

## 2017-10-24 NOTE — PROGRESS NOTE ADULT - PROVIDER SPECIALTY LIST ADULT
Hospitalist
Neurology
Palliative Care
Palliative Care
Hospitalist

## 2017-10-24 NOTE — PROGRESS NOTE ADULT - SUBJECTIVE AND OBJECTIVE BOX
HPI:  83 y/o female with PMHx dementia, mitral valve prolapse, PSHx of lumpectomy for breast cancer presents after witnessed seizure by . Patient seen in post-ictal state.  states wife was sitting in a chair and slumped over and states patient's body shook. Patient voided in pants during the event but did not bite her tongue.  and daughter deny prior seizures and state that she was never on seizure medication. They state that patient has been increasingly lethargic and with a decreased mental state over the past few weeks that is more than her usual dementia. They state patient has stopped talking and has just been moaning over the past few weeks. Possible fever at home. No nausea, vomiting, constipation, diarrhea, hematuria, melena, hematochezia, bleeding, chest pain, shortness of breath, dizziness, head trauma, headache, neck pain. (19 Oct 2017 01:10)  PAST MEDICAL & SURGICAL HISTORY:  HTN (hypertension)  Dementia  Mitral Valve Prolapse  Depression  CVA (Cerebral Vascular Accident) (ICD9 434.91)  CAD (Coronary Artery Disease) (ICD9 414.00): with STENTS in  apast last CATH  in 10/2009 with 90% MID LAD on Medical managemnt  Dementia (ICD9 294.8)  Anxiety (ICD9 300.00)  GERD (Gastroesophageal Reflux Disease) (ICD9 530.81)  Atrial Fibrillation (ICD9 427.31)  Afib (ICD9 427.31)  Hypothyroidism (ICD9 244.9)  Hx of Breast Cancer (ICD9 V10.3)  Hypercholesterolemia (ICD9 272.0)  HTN  Transient Ischemic Attack (TIA) (ICD9 435.9)  No significant past surgical history  S/P Breast Lumpectomy (ICD9 V45.89)   Section (ICD9 669.70)  After Cataract, Bilateral (ICD9 366.50)  HPI:        SYMPTOMS---	                   DIDILITY    OTHER REVIEW OF SYSTEMS:  UNABLE TO OBTAIN  due to: SEDATION, CONFUSION    Baseline ADLs (prior to admission):  Independent [ ] moderately [ ] fully   Dependent   [ ] moderately [ ]fully    	                 T(C): 36.8 (10-22-17 @ 16:56), Max: 37 (10-22-17 @ 13:09)  T(F): 98.2 (10-22-17 @ 16:56), Max: 98.6 (10-22-17 @ 13:09)  HR: 97 (10-22-17 @ 16:56) (84 - 97)  BP: 122/73 (10-22-17 @ 16:56) (121/77 - 194/94)  RR: 18 (10-22-17 @ 16:56) (18 - 19)  SpO2: 98% (10-22-17 @ 16:56) (98% - 100%)  Wt(kg): --      GENERAL:    EYES : non icteric :               Other:     HEENT:      	atraumatic, normocephalic, PEERLA, sclera anicteric, dry oral mucosa   NECK:          Trach:        Vent:  CVS:          Tachypnic:               Bradycardic:              Normal:		   RESP:        tachypnic:                Dyspenic :                  Comfortable:	  GI:          NGT/PEG 	  :      huffman      	  MUSC:       	Normal	Weakness	  Edema	   NEURO:     	No focal deficit	  Focal  PSYCH:           Alert	Oriented	  Lethargic     SKIN:         	Normal	  Other  LYMPH:      	Normal	  Other  	                     ALLERGIES: latex (Unknown)  No Known Drug Allergies  shellfish (Other)  shellfish (Rash)    MEDICATIONS  (STANDING):  aspirin  chewable 81 milliGRAM(s) Oral daily  carvedilol 6.25 milliGRAM(s) Oral every 12 hours  donepezil 10 milliGRAM(s) Oral at bedtime  levETIRAcetam  IVPB 250 milliGRAM(s) IV Intermittent every 12 hours  levothyroxine 75 MICROGram(s) Oral daily  memantine 10 milliGRAM(s) Oral two times a day  pantoprazole  Injectable 40 milliGRAM(s) IV Push daily  sodium chloride 0.9%. 1000 milliLiter(s) (60 mL/Hr) IV Continuous <Continuous>  warfarin 5 milliGRAM(s) Oral once    MEDICATIONS  (PRN):  LORazepam     Tablet 0.5 milliGRAM(s) Oral two times a day PRN Agitation  ondansetron Injectable 4 milliGRAM(s) IV Push every 6 hours PRN Nausea and/or Vomiting    	                   LABS REVIEWED    H/H: 15.2/47.4   10-22 @ 07:37    BUN/CR: --/--  10-22 @ 07:37    Albumin: --  10-22 @ 07:37    CRP/SED RATE: --/-- 10-22 @ 07:37    Sodium: --  10-22 @ 07:37                  	  ADVANCED DIRECTIVES:   FULL CODE [   ]  DNR [  ]    DNI [  ]     MOLST [  ]  PSYCHOSOCIAL-SPIRITUAL ASSESSMENT:       Reviewed       GOALS OF CARE DISCUSSION       Palliative care info/counseling provided	           Family meeting       Advanced Directives addressed	           See previous Palliative Medicine Note  	        REFERRALS	        Palliative Med        Unit SW/Case Mgmt              Speech/Swallow        Hospice             Nutrition         Functional Assessment: KPS:   <30            PPS:  poor      FINAL IMPRESSION AND RECOMMENDATIONS:  adv age  w new siezure onset on meds w poor mentation   multiple comrbidities , aggreesssive measures in progress , GOC and ADV ADIR to discuss Monday
HPI:  85 y/o female with PMHx dementia, mitral valve prolapse, PSHx of lumpectomy for breast cancer presents after witnessed seizure by . Patient seen in post-ictal state.  states wife was sitting in a chair and slumped over and states patient's body shook. Patient voided in pants during the event but did not bite her tongue.  and daughter deny prior seizures and state that she was never on seizure medication. They state that patient has been increasingly lethargic and with a decreased mental state over the past few weeks that is more than her usual dementia. They state patient has stopped talking and has just been moaning over the past few weeks. Possible fever at home. No nausea, vomiting, constipation, diarrhea, hematuria, melena, hematochezia, bleeding, chest pain, shortness of breath, dizziness, head trauma, headache, neck pain. (19 Oct 2017 01:10)  vss  lungs cta   cvs n  abd bs  a/p overall prognosis poor   sx controlled   still full code
INTERVAL HPI/OVERNIGHT EVENTS:  Subjective Complaints: Patient appears still not too verbal although she identifies herself with a soft voice.   Motor: Patient exhibits no focal motor deficits. No seizures reported or observed.  EEG ordered.  Lab results noted: slightly elevated CPK; troponin also elevated but probably non-cardiac.    NEUROLOGICAL EXAM (Pertinent):  Vital Signs Last 24 Hrs  T(C): 36.1 (19 Oct 2017 12:06), Max: 37.1 (18 Oct 2017 23:28)  T(F): 97 (19 Oct 2017 12:06), Max: 98.8 (18 Oct 2017 23:28)  HR: 80 (19 Oct 2017 12:06) (65 - 94)  BP: 184/91 (19 Oct 2017 12:06) (115/62 - 197/96)  BP(mean): --  RR: 18 (19 Oct 2017 12:06) (13 - 20)  SpO2: 99% (19 Oct 2017 12:06) (99% - 100%)    MEDICATIONS  (STANDING):  aspirin  chewable 81 milliGRAM(s) Oral daily  carvedilol 6.25 milliGRAM(s) Oral every 12 hours  donepezil 10 milliGRAM(s) Oral at bedtime  levETIRAcetam  IVPB 250 milliGRAM(s) IV Intermittent every 12 hours  levothyroxine 75 MICROGram(s) Oral daily  memantine 10 milliGRAM(s) Oral two times a day  pantoprazole  Injectable 40 milliGRAM(s) IV Push daily  sodium chloride 0.9%. 1000 milliLiter(s) (75 mL/Hr) IV Continuous <Continuous>    MEDICATIONS  (PRN):  LORazepam     Tablet 0.5 milliGRAM(s) Oral two times a day PRN Agitation  ondansetron Injectable 4 milliGRAM(s) IV Push every 6 hours PRN Nausea and/or Vomiting    LABS:                        13.6   7.6   )-----------( 171      ( 19 Oct 2017 07:35 )             42.0     10-19    142  |  109<H>  |  13  ----------------------------<  73  3.9   |  21<L>  |  1.25    Ca    8.1<L>      19 Oct 2017 07:35  Phos  2.0     10-19  Mg     1.8     10-19    TPro  6.9  /  Alb  2.4<L>  /  TBili  0.3  /  DBili  .13  /  AST  35  /  ALT  19  /  AlkPhos  100  10-19    PT/INR - ( 19 Oct 2017 07:35 )   PT: 98.7 sec;   INR: 8.66 ratio       PTT - ( 19 Oct 2017 07:35 )  PTT:43.5 sec    Assessment & Opinion:  Seizures/Syncope.  Etiology to be determined.  Recommendations:  Elective Brain MRI.  Carotid doppler.  Echocardiogram.  EEG.   DVT prophylaxis as ordered.  Medications:  Patient on maintenance given Keppra.  Await EEG
INTERVAL HPI/OVERNIGHT EVENTS:  Subjective Complaints: Patient appears still somnolent and very [poorly verbal to non-verbal.  Motor: Patient exhibits no focal motor deficits. No seizures reported or observed.  EEG ordered.  Lab results noted: slightly elevated CPK; troponin also elevated but probably non-cardiac.       RECENT RADIOLOGY & ADDITIONAL TESTS:    NEUROLOGICAL EXAM (Pertinent):  Vital Signs Last 24 Hrs  T(C): 36.9 (22 Oct 2017 06:58), Max: 37.7 (21 Oct 2017 17:00)  T(F): 98.4 (22 Oct 2017 06:58), Max: 99.8 (21 Oct 2017 17:00)  HR: 84 (22 Oct 2017 06:58) (84 - 94)  BP: 140/80 (22 Oct 2017 06:58) (140/80 - 194/94)  BP(mean): --  RR: 19 (22 Oct 2017 06:58) (19 - 22)  SpO2: 100% (22 Oct 2017 06:58) (98% - 100%)    MEDICATIONS  (STANDING):  aspirin  chewable 81 milliGRAM(s) Oral daily  carvedilol 6.25 milliGRAM(s) Oral every 12 hours  donepezil 10 milliGRAM(s) Oral at bedtime  levETIRAcetam  IVPB 250 milliGRAM(s) IV Intermittent every 12 hours  levothyroxine 75 MICROGram(s) Oral daily  memantine 10 milliGRAM(s) Oral two times a day  pantoprazole  Injectable 40 milliGRAM(s) IV Push daily  sodium chloride 0.9%. 1000 milliLiter(s) (60 mL/Hr) IV Continuous <Continuous>    MEDICATIONS  (PRN):  LORazepam     Tablet 0.5 milliGRAM(s) Oral two times a day PRN Agitation  ondansetron Injectable 4 milliGRAM(s) IV Push every 6 hours PRN Nausea and/or Vomiting    LABS:                        15.2   5.9   )-----------( 169      ( 22 Oct 2017 07:37 )             47.4     10-21    139  |  106  |  10  ----------------------------<  90  3.8   |  22  |  1.24    Ca    8.5      21 Oct 2017 08:39      PT/INR - ( 22 Oct 2017 07:37 )   PT: 16.2 sec;   INR: 1.47 ratio      Assessment & Opinion:  Seizures/Syncope.  Somnolence and verbally unresponsive  Recommendations:  Elective Brain MRI.  Reorder EEG.    DVT prophylaxis as ordered.  Medications:  Patient on maintenance given Keppra.  Await EEG
INTERVAL HPI/OVERNIGHT EVENTS:  Subjective Complaints: Patient appears still somnolent.  Motor: Patient exhibits no focal motor deficits. No seizures reported or observed.  EEG ordered.  Lab results noted: slightly elevated CPK; troponin also elevated but probably non-cardiac.       NEUROLOGICAL EXAM (Pertinent):  Vital Signs Last 24 Hrs  T(C): 36.5 (20 Oct 2017 17:10), Max: 36.9 (19 Oct 2017 23:25)  T(F): 97.7 (20 Oct 2017 17:10), Max: 98.5 (19 Oct 2017 23:25)  HR: 104 (20 Oct 2017 17:10) (93 - 111)  BP: 132/81 (20 Oct 2017 17:10) (132/71 - 143/80)  BP(mean): --  RR: 19 (20 Oct 2017 17:10) (18 - 19)  SpO2: 98% (20 Oct 2017 17:10) (97% - 99%)    MEDICATIONS  (STANDING):  aspirin  chewable 81 milliGRAM(s) Oral daily  carvedilol 6.25 milliGRAM(s) Oral every 12 hours  donepezil 10 milliGRAM(s) Oral at bedtime  levETIRAcetam  IVPB 250 milliGRAM(s) IV Intermittent every 12 hours  levothyroxine 75 MICROGram(s) Oral daily  memantine 10 milliGRAM(s) Oral two times a day  pantoprazole  Injectable 40 milliGRAM(s) IV Push daily  sodium chloride 0.9%. 1000 milliLiter(s) (75 mL/Hr) IV Continuous <Continuous>  warfarin 3 milliGRAM(s) Oral once    MEDICATIONS  (PRN):  LORazepam     Tablet 0.5 milliGRAM(s) Oral two times a day PRN Agitation  ondansetron Injectable 4 milliGRAM(s) IV Push every 6 hours PRN Nausea and/or Vomiting    LABS:                        13.2   6.0   )-----------( 169      ( 20 Oct 2017 08:01 )             41.1     10-20    141  |  109<H>  |  9   ----------------------------<  66<L>  3.7   |  21<L>  |  1.12    Ca    8.1<L>      20 Oct 2017 08:01  Phos  2.0     10-19  Mg     1.8     10-19    TPro  6.9  /  Alb  2.4<L>  /  TBili  0.3  /  DBili  .13  /  AST  35  /  ALT  19  /  AlkPhos  100  10-19    PT/INR - ( 20 Oct 2017 08:01 )   PT: 17.6 sec;   INR: 1.60 ratio         PTT - ( 19 Oct 2017 07:35 )  PTT:43.5 sec    Assessment & Opinion:  Seizures/Syncope.    Recommendations:  Elective Brain MRI.  EEG cancelled.   DVT prophylaxis as ordered.  Medications:  Patient on maintenance given Keppra.  Await EEG
INTERVAL HPI/OVERNIGHT EVENTS:  Uneventful.  No seizures reported.  Subjective Complaints: Patient's mental state  has improved to more awakening and answering simple questions.  Appears in no acute distress.  Exhibits  no focal motor sensory deficits.  VSS.  EEG done today and pending result.    NEUROLOGICAL EXAM (Pertinent):  Vital Signs Last 24 Hrs  T(C): 36.6 (23 Oct 2017 12:13), Max: 36.8 (22 Oct 2017 16:56)  T(F): 97.8 (23 Oct 2017 12:13), Max: 98.2 (22 Oct 2017 16:56)  HR: 81 (23 Oct 2017 12:13) (81 - 97)  BP: 145/88 (23 Oct 2017 12:13) (122/73 - 151/76)  BP(mean): --  RR: 18 (23 Oct 2017 12:13) (17 - 18)  SpO2: 98% (23 Oct 2017 10:00) (97% - 98%)    MEDICATIONS  (STANDING):  aspirin  chewable 81 milliGRAM(s) Oral daily  carvedilol 6.25 milliGRAM(s) Oral every 12 hours  donepezil 10 milliGRAM(s) Oral at bedtime  levETIRAcetam  IVPB 250 milliGRAM(s) IV Intermittent every 12 hours  levothyroxine 75 MICROGram(s) Oral daily  memantine 10 milliGRAM(s) Oral two times a day  pantoprazole  Injectable 40 milliGRAM(s) IV Push daily  sodium chloride 0.9%. 1000 milliLiter(s) (60 mL/Hr) IV Continuous <Continuous>    MEDICATIONS  (PRN):  LORazepam     Tablet 0.5 milliGRAM(s) Oral two times a day PRN Agitation  ondansetron Injectable 4 milliGRAM(s) IV Push every 6 hours PRN Nausea and/or Vomiting    LABS:                        14.0   6.3   )-----------( 191      ( 23 Oct 2017 07:36 )             42.8     10-23    141  |  109<H>  |  13  ----------------------------<  75  4.1   |  22  |  1.22    Ca    8.6      23 Oct 2017 07:36      PT/INR - ( 23 Oct 2017 07:36 )   PT: 19.4 sec;   INR: 1.76 ratio      Assessment & Opinion:  Seizures/Syncope.  Improved Mental State  Recommendations:  Elective Brain MRI.    DVT prophylaxis as ordered.  Medications:  Patient on maintenance given Keppra.  Await EEG
Patient is a 84y old  Female who presents with a chief complaint of seizure (19 Oct 2017 01:10)      OVERNIGHT EVENTS: no overnight events    REVIEW OF SYSTEMS: poor historian     MEDICATIONS  (STANDING):  aspirin  chewable 81 milliGRAM(s) Oral daily  carvedilol 6.25 milliGRAM(s) Oral every 12 hours  donepezil 10 milliGRAM(s) Oral at bedtime  levETIRAcetam  IVPB 250 milliGRAM(s) IV Intermittent every 12 hours  levothyroxine 75 MICROGram(s) Oral daily  memantine 10 milliGRAM(s) Oral two times a day  pantoprazole  Injectable 40 milliGRAM(s) IV Push daily  sodium chloride 0.9%. 1000 milliLiter(s) (75 mL/Hr) IV Continuous <Continuous>    MEDICATIONS  (PRN):  LORazepam     Tablet 0.5 milliGRAM(s) Oral two times a day PRN Agitation  ondansetron Injectable 4 milliGRAM(s) IV Push every 6 hours PRN Nausea and/or Vomiting      Allergies    latex (Unknown)  No Known Drug Allergies  shellfish (Other)  shellfish (Rash)    Intolerances        T(F): 97 (10-19-17 @ 12:06), Max: 98.8 (10-18-17 @ 23:28)  HR: 80 (10-19-17 @ 12:06) (65 - 94)  BP: 184/91 (10-19-17 @ 12:06) (115/62 - 197/96)  RR: 18 (10-19-17 @ 12:06) (13 - 20)  SpO2: 99% (10-19-17 @ 12:06) (99% - 100%)  Wt(kg): --    PHYSICAL EXAM:  GENERAL: NAD, well-groomed, well-developed, elderly female  HEAD:  Atraumatic, Normocephalic  EYES: PERRLA, conjunctiva and sclera clear  ENMT: No tonsillar erythema, exudates, or enlargement; Moist mucous membranes, Good dentition, No lesions  NECK: Supple, No JVD, Normal thyroid  NERVOUS SYSTEM:  awake, confused, does not follows commands  CHEST/LUNG: Clear to percussion bilaterally; No rales, rhonchi, wheezing, or rubs BL  HEART: Regular rate and rhythm; No murmurs, rubs, or gallops  ABDOMEN: Soft, Nontender, Nondistended; Bowel sounds present  EXTREMITIES:  2+ Peripheral Pulses, No clubbing, cyanosis, or edema BL LE  LYMPH: No lymphadenopathy noted  SKIN: No rashes or lesions    LABS:                        13.6   7.6   )-----------( 171      ( 19 Oct 2017 07:35 )             42.0     10-19    142  |  109<H>  |  13  ----------------------------<  73  3.9   |  21<L>  |  1.25    Ca    8.1<L>      19 Oct 2017 07:35  Phos  2.0     10-19  Mg     1.8     10-19    TPro  6.9  /  Alb  2.4<L>  /  TBili  0.3  /  DBili  .13  /  AST  35  /  ALT  19  /  AlkPhos  100  10-19    PT/INR - ( 19 Oct 2017 07:35 )   PT: 98.7 sec;   INR: 8.66 ratio         PTT - ( 19 Oct 2017 07:35 )  PTT:43.5 sec    Cultures;   CAPILLARY BLOOD GLUCOSE      POCT Blood Glucose.: 178 mg/dL (18 Oct 2017 16:25)    Lipid panel:     CARDIAC MARKERS ( 19 Oct 2017 11:05 )  2.100 ng/mL / x     / 216 U/L / x     / 8.1 ng/mL  CARDIAC MARKERS ( 19 Oct 2017 03:54 )  2.040 ng/mL / x     / 247 U/L / x     / 8.1 ng/mL  CARDIAC MARKERS ( 18 Oct 2017 19:00 )  1.800 ng/mL / x     / 240 U/L / x     / 7.7 ng/mL        RADIOLOGY & ADDITIONAL TESTS:    Imaging Personally Reviewed:  [x	 ] YES      Consultant(s) Notes Reviewed:  [x ] YES     Care Discussed with [x ] Consultants [X ] Patient [ x] Family  [x ]    [x ]  Other; RN
Patient is a 84y old  Female who presents with a chief complaint of seizure (19 Oct 2017 01:10)      OVERNIGHT EVENTS: no overnight events. no seizure like activities    REVIEW OF SYSTEMS: poor historian. Still minimally verbal.    MEDICATIONS  (STANDING):  aspirin  chewable 81 milliGRAM(s) Oral daily  carvedilol 6.25 milliGRAM(s) Oral every 12 hours  donepezil 10 milliGRAM(s) Oral at bedtime  levETIRAcetam  IVPB 250 milliGRAM(s) IV Intermittent every 12 hours  levothyroxine 75 MICROGram(s) Oral daily  memantine 10 milliGRAM(s) Oral two times a day  pantoprazole  Injectable 40 milliGRAM(s) IV Push daily  sodium chloride 0.9%. 1000 milliLiter(s) (75 mL/Hr) IV Continuous <Continuous>    MEDICATIONS  (PRN):  LORazepam     Tablet 0.5 milliGRAM(s) Oral two times a day PRN Agitation  ondansetron Injectable 4 milliGRAM(s) IV Push every 6 hours PRN Nausea and/or Vomiting      Allergies    latex (Unknown)  No Known Drug Allergies  shellfish (Other)  shellfish (Rash)    Intolerances        T(F): 98.5 (10-19-17 @ 23:25), Max: 98.5 (10-19-17 @ 23:25)  HR: 111 (10-20-17 @ 04:40) (83 - 111)  BP: 132/71 (10-20-17 @ 04:40) (132/71 - 170/94)  RR: 18 (10-20-17 @ 04:40) (18 - 18)  SpO2: 97% (10-20-17 @ 04:40) (97% - 99%)  Wt(kg): --    PHYSICAL EXAM:  GENERAL: NAD, well-groomed, well-developed, elderly female  HEAD:  Atraumatic, Normocephalic  EYES: PERRLA, conjunctiva and sclera clear  ENMT: No tonsillar erythema, exudates, or enlargement; Moist mucous membranes, Good dentition, No lesions  NECK: Supple, No JVD, Normal thyroid  NERVOUS SYSTEM:  awake, confused, does not follows commands  CHEST/LUNG: Clear to percussion bilaterally; No rales, rhonchi, wheezing, or rubs BL  HEART: Regular rate and rhythm; No murmurs, rubs, or gallops  ABDOMEN: Soft, Nontender, Nondistended; Bowel sounds present  EXTREMITIES:  2+ Peripheral Pulses, No clubbing, cyanosis, or edema BL LE  LYMPH: No lymphadenopathy noted  SKIN: No rashes or lesions    LABS:                        13.2   6.0   )-----------( 169      ( 20 Oct 2017 08:01 )             41.1     10-20    141  |  109<H>  |  9   ----------------------------<  66<L>  3.7   |  21<L>  |  1.12    Ca    8.1<L>      20 Oct 2017 08:01  Phos  2.0     10-19  Mg     1.8     10-19    TPro  6.9  /  Alb  2.4<L>  /  TBili  0.3  /  DBili  .13  /  AST  35  /  ALT  19  /  AlkPhos  100  10-19    PT/INR - ( 20 Oct 2017 08:01 )   PT: 17.6 sec;   INR: 1.60 ratio         PTT - ( 19 Oct 2017 07:35 )  PTT:43.5 sec    Cultures;   CAPILLARY BLOOD GLUCOSE        Lipid panel:     CARDIAC MARKERS ( 19 Oct 2017 11:05 )  2.100 ng/mL / x     / 216 U/L / x     / 8.1 ng/mL  CARDIAC MARKERS ( 19 Oct 2017 03:54 )  2.040 ng/mL / x     / 247 U/L / x     / 8.1 ng/mL  CARDIAC MARKERS ( 18 Oct 2017 19:00 )  1.800 ng/mL / x     / 240 U/L / x     / 7.7 ng/mL        RADIOLOGY & ADDITIONAL TESTS:    Imaging Personally Reviewed:  [x ] YES      Consultant(s) Notes Reviewed:  [x ] YES     Care Discussed with [ x] Consultants [X ] Patient [x ] Family; spouse  [x ]    [x ]  Other; RN
Patient is a 84y old  Female who presents with a chief complaint of seizure (20 Oct 2017 13:04)      OVERNIGHT EVENTS: none     REVIEW OF SYSTEMS: poor historian. per staff no chest pain/SOB, no diaphoresis, no F/C, no cough, no vomiting    MEDICATIONS  (STANDING):  aspirin  chewable 81 milliGRAM(s) Oral daily  carvedilol 6.25 milliGRAM(s) Oral every 12 hours  donepezil 10 milliGRAM(s) Oral at bedtime  levETIRAcetam  IVPB 250 milliGRAM(s) IV Intermittent every 12 hours  levothyroxine 75 MICROGram(s) Oral daily  memantine 10 milliGRAM(s) Oral two times a day  pantoprazole  Injectable 40 milliGRAM(s) IV Push daily  sodium chloride 0.9%. 1000 milliLiter(s) (60 mL/Hr) IV Continuous <Continuous>    MEDICATIONS  (PRN):  LORazepam     Tablet 0.5 milliGRAM(s) Oral two times a day PRN Agitation  ondansetron Injectable 4 milliGRAM(s) IV Push every 6 hours PRN Nausea and/or Vomiting      Allergies    latex (Unknown)  No Known Drug Allergies  shellfish (Other)  shellfish (Rash)    Intolerances        T(F): 98.4 (10-22-17 @ 06:58), Max: 99.8 (10-21-17 @ 17:00)  HR: 84 (10-22-17 @ 06:58) (84 - 94)  BP: 140/80 (10-22-17 @ 06:58) (140/80 - 194/94)  RR: 19 (10-22-17 @ 06:58) (19 - 22)  SpO2: 100% (10-22-17 @ 06:58) (98% - 100%)  Wt(kg): --    PHYSICAL EXAM:  GENERAL: NAD, well-groomed, well-developed, elderly female  HEAD:  Atraumatic, Normocephalic  EYES: PERRLA, conjunctiva and sclera clear  ENMT: No tonsillar erythema, exudates, or enlargement; Moist mucous membranes, Good dentition, No lesions  NECK: Supple, No JVD, Normal thyroid  NERVOUS SYSTEM:  awake, confused, does not follows commands  CHEST/LUNG: Clear to percussion bilaterally; No rales, rhonchi, wheezing, or rubs BL  HEART: Regular rate and rhythm; No murmurs, rubs, or gallops  ABDOMEN: Soft, Nontender, Nondistended; Bowel sounds present  EXTREMITIES:  2+ Peripheral Pulses, No clubbing, cyanosis, or edema BL LE  LYMPH: No lymphadenopathy noted  SKIN: No rashes or lesions    LABS:                        15.2   5.9   )-----------( 169      ( 22 Oct 2017 07:37 )             47.4     10-21    139  |  106  |  10  ----------------------------<  90  3.8   |  22  |  1.24    Ca    8.5      21 Oct 2017 08:39      PT/INR - ( 22 Oct 2017 07:37 )   PT: 16.2 sec;   INR: 1.47 ratio             Cultures;   CAPILLARY BLOOD GLUCOSE        Lipid panel:           RADIOLOGY & ADDITIONAL TESTS:    Imaging Personally Reviewed:  [x	 ] YES      Consultant(s) Notes Reviewed:  [x ] YES     Care Discussed with [x ] Consultants [X ] Patient [ x] Family  [x ]    [x ]  Other; RN
· Subjective and Objective:   INTERVAL HPI/OVERNIGHT EVENTS:  Uneventful.  No seizures reported.  Subjective Complaints: Patient's mental state  has improved to more awakening and answering simple questions.  Appears in no acute distress.  Exhibits  no focal motor sensory deficits.  VSS.  Orientation is poor.  MInimally talking.    RECENT RADIOLOGY & ADDITIONAL TESTS:    NEUROLOGICAL EXAM (Pertinent):  Vital Signs Last 24 Hrs  T(C): 36.7 (24 Oct 2017 11:35), Max: 37.1 (23 Oct 2017 16:32)  T(F): 98 (24 Oct 2017 11:35), Max: 98.8 (23 Oct 2017 16:32)  HR: 96 (24 Oct 2017 11:35) (91 - 100)  BP: 110/68 (24 Oct 2017 11:35) (110/68 - 148/82)  BP(mean): --  RR: 16 (24 Oct 2017 11:35) (16 - 16)  SpO2: 97% (24 Oct 2017 11:35) (95% - 97%)      MEDICATIONS  (STANDING):  aspirin  chewable 81 milliGRAM(s) Oral daily  carvedilol 6.25 milliGRAM(s) Oral every 12 hours  donepezil 10 milliGRAM(s) Oral at bedtime  levETIRAcetam  IVPB 250 milliGRAM(s) IV Intermittent every 12 hours  levothyroxine 75 MICROGram(s) Oral daily  memantine 10 milliGRAM(s) Oral two times a day  pantoprazole  Injectable 40 milliGRAM(s) IV Push daily  sodium chloride 0.9%. 1000 milliLiter(s) (60 mL/Hr) IV Continuous <Continuous>    MEDICATIONS  (PRN):  LORazepam     Tablet 0.5 milliGRAM(s) Oral two times a day PRN Agitation  ondansetron Injectable 4 milliGRAM(s) IV Push every 6 hours PRN Nausea and/or Vomiting    LABS:                        14.0   6.3   )-----------( 191      ( 23 Oct 2017 07:36 )             42.8     10-23    141  |  109<H>  |  13  ----------------------------<  75  4.1   |  22  |  1.22    Ca    8.6      23 Oct 2017 07:36      PT/INR - ( 23 Oct 2017 07:36 )   PT: 19.4 sec;   INR: 1.76 ratio      Assessment & Opinion:  Seizures/Syncope.  Improved Mental State  Recommendations:    DVT prophylaxis as ordered.  Medications:  Patient on maintenance given Keppra.
Patient is a 84y old  Female who presents with a chief complaint of seizure (20 Oct 2017 13:04)      OVERNIGHT EVENTS: no overnight events     REVIEW OF SYSTEMS: poor historian. per staff no chest pain/SOB, no diaphoresis, no F/C, no cough, no vomiting    MEDICATIONS  (STANDING):  aspirin  chewable 81 milliGRAM(s) Oral daily  carvedilol 6.25 milliGRAM(s) Oral every 12 hours  donepezil 10 milliGRAM(s) Oral at bedtime  levETIRAcetam  IVPB 250 milliGRAM(s) IV Intermittent every 12 hours  levothyroxine 75 MICROGram(s) Oral daily  memantine 10 milliGRAM(s) Oral two times a day  pantoprazole  Injectable 40 milliGRAM(s) IV Push daily  sodium chloride 0.9%. 1000 milliLiter(s) (75 mL/Hr) IV Continuous <Continuous>  warfarin 5 milliGRAM(s) Oral once    MEDICATIONS  (PRN):  LORazepam     Tablet 0.5 milliGRAM(s) Oral two times a day PRN Agitation  ondansetron Injectable 4 milliGRAM(s) IV Push every 6 hours PRN Nausea and/or Vomiting      Allergies    latex (Unknown)  No Known Drug Allergies  shellfish (Other)  shellfish (Rash)    Intolerances        T(F): 97.2 (10-21-17 @ 05:50), Max: 97.8 (10-20-17 @ 23:19)  HR: 99 (10-21-17 @ 05:50) (93 - 104)  BP: 192/98 (10-21-17 @ 05:50) (132/81 - 192/98)  RR: 18 (10-21-17 @ 05:50) (18 - 19)  SpO2: 99% (10-20-17 @ 23:19) (98% - 99%)  Wt(kg): --    PHYSICAL EXAM:  GENERAL: NAD, well-groomed, well-developed, elderly female  HEAD:  Atraumatic, Normocephalic  EYES: PERRLA, conjunctiva and sclera clear  ENMT: No tonsillar erythema, exudates, or enlargement; Moist mucous membranes, Good dentition, No lesions  NECK: Supple, No JVD, Normal thyroid  NERVOUS SYSTEM:  awake, confused, does not follows commands  CHEST/LUNG: Clear to percussion bilaterally; No rales, rhonchi, wheezing, or rubs BL  HEART: Regular rate and rhythm; No murmurs, rubs, or gallops  ABDOMEN: Soft, Nontender, Nondistended; Bowel sounds present  EXTREMITIES:  2+ Peripheral Pulses, No clubbing, cyanosis, or edema BL LE  LYMPH: No lymphadenopathy noted  SKIN: No rashes or lesions    LABS:                        14.5   6.0   )-----------( 182      ( 21 Oct 2017 08:39 )             44.8     10-21    139  |  106  |  10  ----------------------------<  90  3.8   |  22  |  1.24    Ca    8.5      21 Oct 2017 08:39      PT/INR - ( 21 Oct 2017 08:39 )   PT: 14.4 sec;   INR: 1.31 ratio             Cultures;   CAPILLARY BLOOD GLUCOSE        Lipid panel:     CARDIAC MARKERS ( 19 Oct 2017 11:05 )  2.100 ng/mL / x     / 216 U/L / x     / 8.1 ng/mL      Imaging Personally Reviewed:  [x	 ] YES      Consultant(s) Notes Reviewed:  [x ] YES     Care Discussed with [x ] Consultants [X ] Patient [ x] Family  [x ]    [x ]  Other; RN

## 2017-10-24 NOTE — GOALS OF CARE CONVERSATION - PERSONAL ADVANCE DIRECTIVE - CONVERSATION DETAILS
Met with  who was sitting at pts bedside.   informed me that pt has Dementia, but the Dementia is getting worse.  He informed me that he takes care of wife by himself.   I asked him isn't ot difficult.  He told me yes, but this is my wife & that's what needs to be done.  we discussed Advance Directives, but he informed me that he wants "everything done" even if it means putting her on a respirator if needed.  He is not interested in "comfort Mesures or Palliative Care".  He told me when its time for "God to call her home, that will be her time".

## 2017-10-24 NOTE — GOALS OF CARE CONVERSATION - PERSONAL ADVANCE DIRECTIVE - WHAT MATTERS MOST
informed me that he wants to continue with aggressive tx, & wants no Advance Directives at this time.

## 2017-10-27 DIAGNOSIS — Z95.5 PRESENCE OF CORONARY ANGIOPLASTY IMPLANT AND GRAFT: ICD-10-CM

## 2017-10-27 DIAGNOSIS — K21.9 GASTRO-ESOPHAGEAL REFLUX DISEASE WITHOUT ESOPHAGITIS: ICD-10-CM

## 2017-10-27 DIAGNOSIS — R56.9 UNSPECIFIED CONVULSIONS: ICD-10-CM

## 2017-10-27 DIAGNOSIS — N18.3 CHRONIC KIDNEY DISEASE, STAGE 3 (MODERATE): ICD-10-CM

## 2017-10-27 DIAGNOSIS — Z98.41 CATARACT EXTRACTION STATUS, RIGHT EYE: ICD-10-CM

## 2017-10-27 DIAGNOSIS — Z91.013 ALLERGY TO SEAFOOD: ICD-10-CM

## 2017-10-27 DIAGNOSIS — F32.9 MAJOR DEPRESSIVE DISORDER, SINGLE EPISODE, UNSPECIFIED: ICD-10-CM

## 2017-10-27 DIAGNOSIS — G30.9 ALZHEIMER'S DISEASE, UNSPECIFIED: ICD-10-CM

## 2017-10-27 DIAGNOSIS — I35.0 NONRHEUMATIC AORTIC (VALVE) STENOSIS: ICD-10-CM

## 2017-10-27 DIAGNOSIS — I50.30 UNSPECIFIED DIASTOLIC (CONGESTIVE) HEART FAILURE: ICD-10-CM

## 2017-10-27 DIAGNOSIS — N17.9 ACUTE KIDNEY FAILURE, UNSPECIFIED: ICD-10-CM

## 2017-10-27 DIAGNOSIS — I13.0 HYPERTENSIVE HEART AND CHRONIC KIDNEY DISEASE WITH HEART FAILURE AND STAGE 1 THROUGH STAGE 4 CHRONIC KIDNEY DISEASE, OR UNSPECIFIED CHRONIC KIDNEY DISEASE: ICD-10-CM

## 2017-10-27 DIAGNOSIS — I34.1 NONRHEUMATIC MITRAL (VALVE) PROLAPSE: ICD-10-CM

## 2017-10-27 DIAGNOSIS — F02.80 DEMENTIA IN OTHER DISEASES CLASSIFIED ELSEWHERE, UNSPECIFIED SEVERITY, WITHOUT BEHAVIORAL DISTURBANCE, PSYCHOTIC DISTURBANCE, MOOD DISTURBANCE, AND ANXIETY: ICD-10-CM

## 2017-10-27 DIAGNOSIS — Z86.73 PERSONAL HISTORY OF TRANSIENT ISCHEMIC ATTACK (TIA), AND CEREBRAL INFARCTION WITHOUT RESIDUAL DEFICITS: ICD-10-CM

## 2017-10-27 DIAGNOSIS — E44.0 MODERATE PROTEIN-CALORIE MALNUTRITION: ICD-10-CM

## 2017-10-27 DIAGNOSIS — Z79.01 LONG TERM (CURRENT) USE OF ANTICOAGULANTS: ICD-10-CM

## 2017-10-27 DIAGNOSIS — Z91.040 LATEX ALLERGY STATUS: ICD-10-CM

## 2017-10-27 DIAGNOSIS — I48.91 UNSPECIFIED ATRIAL FIBRILLATION: ICD-10-CM

## 2017-10-27 DIAGNOSIS — Z85.3 PERSONAL HISTORY OF MALIGNANT NEOPLASM OF BREAST: ICD-10-CM

## 2017-10-27 DIAGNOSIS — Z79.82 LONG TERM (CURRENT) USE OF ASPIRIN: ICD-10-CM

## 2017-10-27 DIAGNOSIS — F41.9 ANXIETY DISORDER, UNSPECIFIED: ICD-10-CM

## 2017-10-27 DIAGNOSIS — R13.10 DYSPHAGIA, UNSPECIFIED: ICD-10-CM

## 2017-10-27 DIAGNOSIS — E03.9 HYPOTHYROIDISM, UNSPECIFIED: ICD-10-CM

## 2017-10-27 DIAGNOSIS — E78.00 PURE HYPERCHOLESTEROLEMIA, UNSPECIFIED: ICD-10-CM

## 2017-10-27 DIAGNOSIS — Z82.3 FAMILY HISTORY OF STROKE: ICD-10-CM

## 2017-10-27 DIAGNOSIS — I25.10 ATHEROSCLEROTIC HEART DISEASE OF NATIVE CORONARY ARTERY WITHOUT ANGINA PECTORIS: ICD-10-CM

## 2017-10-27 DIAGNOSIS — Z98.42 CATARACT EXTRACTION STATUS, LEFT EYE: ICD-10-CM

## 2018-01-07 ENCOUNTER — INPATIENT (INPATIENT)
Facility: HOSPITAL | Age: 83
LOS: 14 days | Discharge: HOME | DRG: 871 | End: 2018-01-22
Attending: INTERNAL MEDICINE | Admitting: INTERNAL MEDICINE
Payer: MEDICARE

## 2018-01-07 VITALS — WEIGHT: 115.08 LBS | RESPIRATION RATE: 18 BRPM

## 2018-01-07 DIAGNOSIS — A41.9 SEPSIS, UNSPECIFIED ORGANISM: ICD-10-CM

## 2018-01-07 DIAGNOSIS — N39.0 URINARY TRACT INFECTION, SITE NOT SPECIFIED: ICD-10-CM

## 2018-01-07 LAB
ALBUMIN SERPL ELPH-MCNC: 3.1 G/DL — LOW (ref 3.3–5.2)
ALP SERPL-CCNC: 126 U/L — HIGH (ref 40–120)
ALT FLD-CCNC: 24 U/L — SIGNIFICANT CHANGE UP
ANION GAP SERPL CALC-SCNC: 16 MMOL/L — SIGNIFICANT CHANGE UP (ref 5–17)
APPEARANCE UR: ABNORMAL
APTT BLD: 56.6 SEC — HIGH (ref 27.5–37.4)
AST SERPL-CCNC: 33 U/L — HIGH
BACTERIA # UR AUTO: ABNORMAL
BASOPHILS # BLD AUTO: 0 K/UL — SIGNIFICANT CHANGE UP (ref 0–0.2)
BASOPHILS NFR BLD AUTO: 0.1 % — SIGNIFICANT CHANGE UP (ref 0–2)
BILIRUB SERPL-MCNC: 0.2 MG/DL — LOW (ref 0.4–2)
BILIRUB UR-MCNC: ABNORMAL
BLD GP AB SCN SERPL QL: SIGNIFICANT CHANGE UP
BUN SERPL-MCNC: 33 MG/DL — HIGH (ref 8–20)
CALCIUM SERPL-MCNC: 9.9 MG/DL — SIGNIFICANT CHANGE UP (ref 8.6–10.2)
CHLORIDE SERPL-SCNC: 102 MMOL/L — SIGNIFICANT CHANGE UP (ref 98–107)
CK SERPL-CCNC: 89 U/L — SIGNIFICANT CHANGE UP (ref 25–170)
CO2 SERPL-SCNC: 26 MMOL/L — SIGNIFICANT CHANGE UP (ref 22–29)
COLOR SPEC: YELLOW — SIGNIFICANT CHANGE UP
CREAT SERPL-MCNC: 1.71 MG/DL — HIGH (ref 0.5–1.3)
DIFF PNL FLD: ABNORMAL
EOSINOPHIL # BLD AUTO: 0 K/UL — SIGNIFICANT CHANGE UP (ref 0–0.5)
EOSINOPHIL NFR BLD AUTO: 0.4 % — SIGNIFICANT CHANGE UP (ref 0–6)
EPI CELLS # UR: ABNORMAL
GLUCOSE SERPL-MCNC: 219 MG/DL — HIGH (ref 70–115)
GLUCOSE UR QL: NEGATIVE MG/DL — SIGNIFICANT CHANGE UP
HCT VFR BLD CALC: 40.4 % — SIGNIFICANT CHANGE UP (ref 37–47)
HGB BLD-MCNC: 13 G/DL — SIGNIFICANT CHANGE UP (ref 12–16)
INR BLD: 7.61 RATIO — CRITICAL HIGH (ref 0.88–1.16)
KETONES UR-MCNC: ABNORMAL
LACTATE BLDV-MCNC: 2.2 MMOL/L — HIGH (ref 0.5–2)
LACTATE BLDV-MCNC: 2.7 MMOL/L — HIGH (ref 0.5–2)
LEUKOCYTE ESTERASE UR-ACNC: ABNORMAL
LYMPHOCYTES # BLD AUTO: 1.8 K/UL — SIGNIFICANT CHANGE UP (ref 1–4.8)
LYMPHOCYTES # BLD AUTO: 25.5 % — SIGNIFICANT CHANGE UP (ref 20–55)
MAGNESIUM SERPL-MCNC: 2.3 MG/DL — SIGNIFICANT CHANGE UP (ref 1.6–2.6)
MCHC RBC-ENTMCNC: 30.4 PG — SIGNIFICANT CHANGE UP (ref 27–31)
MCHC RBC-ENTMCNC: 32.2 G/DL — SIGNIFICANT CHANGE UP (ref 32–36)
MCV RBC AUTO: 94.6 FL — SIGNIFICANT CHANGE UP (ref 81–99)
MONOCYTES # BLD AUTO: 0.5 K/UL — SIGNIFICANT CHANGE UP (ref 0–0.8)
MONOCYTES NFR BLD AUTO: 6.7 % — SIGNIFICANT CHANGE UP (ref 3–10)
NEUTROPHILS # BLD AUTO: 4.8 K/UL — SIGNIFICANT CHANGE UP (ref 1.8–8)
NEUTROPHILS NFR BLD AUTO: 67.2 % — SIGNIFICANT CHANGE UP (ref 37–73)
NITRITE UR-MCNC: NEGATIVE — SIGNIFICANT CHANGE UP
PH UR: 5 — SIGNIFICANT CHANGE UP (ref 5–8)
PHOSPHATE SERPL-MCNC: 3.4 MG/DL — SIGNIFICANT CHANGE UP (ref 2.4–4.7)
PLATELET # BLD AUTO: 224 K/UL — SIGNIFICANT CHANGE UP (ref 150–400)
POTASSIUM SERPL-MCNC: 4.6 MMOL/L — SIGNIFICANT CHANGE UP (ref 3.5–5.3)
POTASSIUM SERPL-SCNC: 4.6 MMOL/L — SIGNIFICANT CHANGE UP (ref 3.5–5.3)
PROT SERPL-MCNC: 7.7 G/DL — SIGNIFICANT CHANGE UP (ref 6.6–8.7)
PROT UR-MCNC: 100 MG/DL
PROTHROM AB SERPL-ACNC: 87.2 SEC — HIGH (ref 9.8–12.7)
RBC # BLD: 4.27 M/UL — LOW (ref 4.4–5.2)
RBC # FLD: 15.6 % — SIGNIFICANT CHANGE UP (ref 11–15.6)
RBC CASTS # UR COMP ASSIST: ABNORMAL /HPF (ref 0–4)
SODIUM SERPL-SCNC: 144 MMOL/L — SIGNIFICANT CHANGE UP (ref 135–145)
SP GR SPEC: 1.02 — SIGNIFICANT CHANGE UP (ref 1.01–1.02)
TROPONIN T SERPL-MCNC: 0.09 NG/ML — HIGH (ref 0–0.06)
TYPE + AB SCN PNL BLD: SIGNIFICANT CHANGE UP
UROBILINOGEN FLD QL: 1 MG/DL
WBC # BLD: 7.1 K/UL — SIGNIFICANT CHANGE UP (ref 4.8–10.8)
WBC # FLD AUTO: 7.1 K/UL — SIGNIFICANT CHANGE UP (ref 4.8–10.8)
WBC UR QL: SIGNIFICANT CHANGE UP

## 2018-01-07 PROCEDURE — 71045 X-RAY EXAM CHEST 1 VIEW: CPT | Mod: 26

## 2018-01-07 PROCEDURE — 70450 CT HEAD/BRAIN W/O DYE: CPT | Mod: 26

## 2018-01-07 PROCEDURE — 99292 CRITICAL CARE ADDL 30 MIN: CPT

## 2018-01-07 PROCEDURE — 99291 CRITICAL CARE FIRST HOUR: CPT

## 2018-01-07 PROCEDURE — 93010 ELECTROCARDIOGRAM REPORT: CPT

## 2018-01-07 RX ORDER — PIPERACILLIN AND TAZOBACTAM 4; .5 G/20ML; G/20ML
3.38 INJECTION, POWDER, LYOPHILIZED, FOR SOLUTION INTRAVENOUS EVERY 8 HOURS
Qty: 0 | Refills: 0 | Status: DISCONTINUED | OUTPATIENT
Start: 2018-01-07 | End: 2018-01-08

## 2018-01-07 RX ORDER — VANCOMYCIN HCL 1 G
1000 VIAL (EA) INTRAVENOUS ONCE
Qty: 0 | Refills: 0 | Status: COMPLETED | OUTPATIENT
Start: 2018-01-07 | End: 2018-01-07

## 2018-01-07 RX ORDER — PIPERACILLIN AND TAZOBACTAM 4; .5 G/20ML; G/20ML
3.38 INJECTION, POWDER, LYOPHILIZED, FOR SOLUTION INTRAVENOUS ONCE
Qty: 0 | Refills: 0 | Status: COMPLETED | OUTPATIENT
Start: 2018-01-07 | End: 2018-01-07

## 2018-01-07 RX ORDER — PHYTONADIONE (VIT K1) 5 MG
10 TABLET ORAL ONCE
Qty: 0 | Refills: 0 | Status: COMPLETED | OUTPATIENT
Start: 2018-01-07 | End: 2018-01-07

## 2018-01-07 RX ORDER — SODIUM CHLORIDE 9 MG/ML
1000 INJECTION, SOLUTION INTRAVENOUS
Qty: 0 | Refills: 0 | Status: DISCONTINUED | OUTPATIENT
Start: 2018-01-07 | End: 2018-01-09

## 2018-01-07 RX ORDER — NOREPINEPHRINE BITARTRATE/D5W 8 MG/250ML
0.5 PLASTIC BAG, INJECTION (ML) INTRAVENOUS
Qty: 8 | Refills: 0 | Status: DISCONTINUED | OUTPATIENT
Start: 2018-01-07 | End: 2018-01-07

## 2018-01-07 RX ORDER — SODIUM CHLORIDE 9 MG/ML
1800 INJECTION INTRAMUSCULAR; INTRAVENOUS; SUBCUTANEOUS ONCE
Qty: 0 | Refills: 0 | Status: COMPLETED | OUTPATIENT
Start: 2018-01-07 | End: 2018-01-07

## 2018-01-07 RX ORDER — VANCOMYCIN HCL 1 G
1000 VIAL (EA) INTRAVENOUS EVERY 24 HOURS
Qty: 0 | Refills: 0 | Status: DISCONTINUED | OUTPATIENT
Start: 2018-01-08 | End: 2018-01-08

## 2018-01-07 RX ORDER — NOREPINEPHRINE BITARTRATE/D5W 8 MG/250ML
0.3 PLASTIC BAG, INJECTION (ML) INTRAVENOUS
Qty: 8 | Refills: 0 | Status: DISCONTINUED | OUTPATIENT
Start: 2018-01-07 | End: 2018-01-09

## 2018-01-07 RX ORDER — VANCOMYCIN HCL 1 G
1000 VIAL (EA) INTRAVENOUS EVERY 12 HOURS
Qty: 0 | Refills: 0 | Status: DISCONTINUED | OUTPATIENT
Start: 2018-01-07 | End: 2018-01-07

## 2018-01-07 RX ADMIN — Medication 30.49 MICROGRAM(S)/KG/MIN: at 21:47

## 2018-01-07 RX ADMIN — Medication 250 MILLIGRAM(S): at 18:51

## 2018-01-07 RX ADMIN — PIPERACILLIN AND TAZOBACTAM 200 GRAM(S): 4; .5 INJECTION, POWDER, LYOPHILIZED, FOR SOLUTION INTRAVENOUS at 17:11

## 2018-01-07 RX ADMIN — SODIUM CHLORIDE 1800 MILLILITER(S): 9 INJECTION INTRAMUSCULAR; INTRAVENOUS; SUBCUTANEOUS at 16:30

## 2018-01-07 RX ADMIN — Medication 102 MILLIGRAM(S): at 18:09

## 2018-01-07 NOTE — ED ADULT NURSE REASSESSMENT NOTE - NS ED NURSE REASSESS COMMENT FT1
Pt found to be hypothermic depsite warm IV fluids and warm blankets, hypothermic warming blanket provided.  Indwelling huffman with temperature sensing in place.

## 2018-01-07 NOTE — ED PROVIDER NOTE - NEURO NEGATIVE STATEMENT, MLM
no loss of consciousness, no gait abnormality, no headache, no sensory deficits, and no weakness. AMS

## 2018-01-07 NOTE — ED ADULT NURSE REASSESSMENT NOTE - NS ED NURSE REASSESS COMMENT FT1
Family arrival to ed with additional information, pt recently had and episode of "staring off and drooling", was brought to [place] and diagnosed with possible seizures.  Today, pt was at baseline mental status sitting in wheelchair when found slumped over in wheelchair, unresponsive, drooling.

## 2018-01-07 NOTE — ED ADULT NURSE REASSESSMENT NOTE - NS ED NURSE REASSESS COMMENT FT1
Repeat lactate 2.2, ICU PA Kennedy aware, no further intervention needed at this time, will hang fluids when in ICU after central line placement.

## 2018-01-07 NOTE — ED PROVIDER NOTE - PROGRESS NOTE DETAILS
PT'S FAMILY ARRIVED AFTER INITIAL EVALUATION. PT ESSENTIALLY NON VERBAL SINCE OCTOBER WHEN SHE WAS HOSPITALIZED AT [facility] FOR STROKE/SEIZURE AS PER DISCHARGE PAPER. ALSO HX AFIB . OVER PAST TWO DAYS PT LETHARGIC . TODAY WHEN SITTING IN FRONT OF TV SLUMPED TO SIDE WHICH HAPPENS WHEN SHE FALLS ASLEEP. DAUGHTER CHECKED HER AND SHE DID NOT RESPOND. PT'S BLOOD PRESSURE IMPROVED HOWEVER APPROXIMATELY 1800 BP STARTED TO FALL .ICU CALLED FOR CONSULT APPROXIMATELY 1830. LEVOPHED STARTED . FEMORAL LINE ATTEMPTED ON RIGHT. BLOOD RETURNED BUT UNABLE TO FEED  CENTRAL LINE.  ICU DRE NASH CAME TO ER AND HAS ACCEPTED PT WITH DR GRAY

## 2018-01-07 NOTE — ED ADULT NURSE NOTE - OBJECTIVE STATEMENT
Pt presents to ED awake, nonverbal, uncooperative, fighting off nurses and staff.  As per EMS, pt was sitting in wheelchair when she became lethargic, slumped over and questionable unresponsiveness.  As per EMS, PD began short round of chest compressions which was when pt woke up.  As per EMS, family states pt "mentates normally".  Respirations even and unlabored.  Moving all extremities well.

## 2018-01-07 NOTE — H&P ADULT - PROBLEM SELECTOR PLAN 1
starting broad spectrum antibiotics ( Vanco and Zozyn)    huffman and follow strick I&O's  goal UOP >0.5 cc/kg/hr  follow cultures

## 2018-01-07 NOTE — ED ADULT NURSE REASSESSMENT NOTE - NS ED NURSE REASSESS COMMENT FT1
Pt found to be hypotensive and unresponsive, Dr. Lazaro called to bedside.  LEvophed initiated and central line being inserted.

## 2018-01-07 NOTE — ED ADULT NURSE REASSESSMENT NOTE - NS ED NURSE REASSESS COMMENT FT1
lab called to check on status of urine, as per lab no urine was received.  will send additional sample./

## 2018-01-07 NOTE — ED ADULT NURSE REASSESSMENT NOTE - NS ED NURSE REASSESS COMMENT FT1
Dr. Lazaro unable to place central line, levophed infusing through EJ.  Pt maintaining BP with levo gtt, no need for central line placement at this time as per Dr. Lazaro.  ICU DRE Benavides at bedside, awaiting clean room in MICU.

## 2018-01-07 NOTE — ED PROVIDER NOTE - OBJECTIVE STATEMENT
73 YO FEMALE PRESENTED AS CODE STROKE. AS PER EMS, SUDDEN ONSET ALC. ON SCENE BP 74/40 APPROXIMATE. IN ER RECTAL TEMP 94. EICU STROKE MD CALLED HOWEVER IT WAS FELT PT MORE LIKELY SEPTIC AND CODE STROKE CANCELLED. STAT IV PACED  LEFT EJ AND IV FLUDS STARTED AT 30 ML PER KG. IV ANTIBIOTICS ORDERED . FAMILY HAD NOT ARRIVED AT THE TMIE OF INITIAL ASSESSMENT. PATIENT UNABLE TO GIVE HISTORY OR ROS. PT UNABLE TO GIVE PMH.  MED HX Afib    Breast cancer    Mitral valve prolapse

## 2018-01-07 NOTE — ED PROCEDURE NOTE - CPROC ED INFUS LINE DETAIL1
The catheter was placed using sterile technique./The guidewire was recovered./The location was identified, and the area was draped and prepped.

## 2018-01-07 NOTE — ED ADULT NURSE REASSESSMENT NOTE - NS ED NURSE REASSESS COMMENT FT1
Pt found to by hypothermic, warm blankets provided.  Dr. Lazaro at bedside to place EJ for IV access.

## 2018-01-07 NOTE — ED PROVIDER NOTE - CONSTITUTIONAL MENTATION
Patient attended phase 2 Cardiac Rehabilitation today session 30. Session report will be scanned in by medical records under the media tab after discharge. The patient was ready to learn and received reinforced education on dining out healthy during today's session, no barriers were apparent at this time.   ALTERED LOC

## 2018-01-07 NOTE — H&P ADULT - ATTENDING COMMENTS
Pt admitted overnight by [facility] PA as supervised by eICU physician. I have seen and evaluated this patient independently at 8am and again at 1400, I agree with the above findings: Pt with septic shock likely urinary source with hypothermia and hypotension/ confusion. Pt remains confused and on pressors. Family is daughter and  who came to bedside today, updated on plan of care. Pt is full code at present.   total attending CC time 60min

## 2018-01-07 NOTE — H&P ADULT - NSHPLABSRESULTS_GEN_ALL_CORE
01-07    144  |  102  |  33.0<H>  ----------------------------<  219<H>  4.6   |  26.0  |  1.71<H>    Ca    9.9      07 Jan 2018 16:33  Phos  3.4     01-07  Mg     2.3     01-07    TPro  7.7  /  Alb  3.1<L>  /  TBili  0.2<L>  /  DBili  x   /  AST  33<H>  /  ALT  24  /  AlkPhos  126<H>  01-07                            13.0   7.1   )-----------( 224      ( 07 Jan 2018 16:33 )             40.4       PT/INR - ( 07 Jan 2018 16:33 )   PT: 87.2 sec;   INR: 7.61 ratio         PTT - ( 07 Jan 2018 16:33 )  PTT:56.6 sec        LIVER FUNCTIONS - ( 07 Jan 2018 16:33 )  Alb: 3.1 g/dL / Pro: 7.7 g/dL / ALK PHOS: 126 U/L / ALT: 24 U/L / AST: 33 U/L / GGT: x             CAPILLARY BLOOD GLUCOSE      POCT Blood Glucose.: 123 mg/dL (07 Jan 2018 16:12) 144  |  102  |  33.0<H>  ----------------------------<  219<H>  4.6   |  26.0  |  1.71<H>    Ca    9.9      07 Jan 2018 16:33  Phos  3.4     01-07  Mg     2.3     01-07    TPro  7.7  /  Alb  3.1<L>  /  TBili  0.2<L>  /  DBili  x   /  AST  33<H>  /  ALT  24  /  AlkPhos  126<H>  01-07                        13.0   7.1   )-----------( 224      ( 07 Jan 2018 16:33 )             40.4   PT/INR - ( 07 Jan 2018 16:33 )   PT: 87.2 sec;   INR: 7.61 ratio    PTT - ( 07 Jan 2018 16:33 )  PTT:56.6 sec    LIVER FUNCTIONS - ( 07 Jan 2018 16:33 )  Alb: 3.1 g/dL / Pro: 7.7 g/dL / ALK PHOS: 126 U/L / ALT: 24 U/L / AST: 33 U/L / GGT: x           CAPILLARY BLOOD GLUCOSE      POCT Blood Glucose.: 123 mg/dL (07 Jan 2018 16:12)

## 2018-01-07 NOTE — ED PROVIDER NOTE - CRITICAL CARE INDICATION, MLM
patient was critically ill... Patient was critically ill with a high probability of imminent or life threatening deterioration.  HYPOTENSIVE AND HYPOTHERMIC. STAT IVS, STAT IV FLUIDS, STAT LABS, STAT CXR, STAT RAYMUNDO CATHETER , STAT IV FLUID RESUS AT 30 CC/KG, STAT IV AB, ICU CONSULT. ICU ADMISSION

## 2018-01-07 NOTE — ED ADULT TRIAGE NOTE - CHIEF COMPLAINT QUOTE
pt BIBA aphasic with right side facial droop, LKW 15:100. EMS reports patient was witnessed to have passed out at 15:00, became pulseless and CPR initiated for appx 30 seconds by [facility]. pt is awake on arrival, unable to follow commands, hypotensive 78/50 in the field with BS 74 as well, ER MD at bedside and code stroke activated pt BIBA aphasic with right side facial droop, LKW 15:00. EMS reports patient was witnessed to have passed out at 15:00, became pulseless and CPR initiated for appx 30 seconds by [facility]. pt is awake on arrival, unable to follow commands, hypotensive 78/50 in the field with BS 74 as well, ER MD at bedside and code stroke activated

## 2018-01-07 NOTE — ED PROVIDER NOTE - CRITICAL CARE PROVIDED
additional history taking/documentation/90 MINUTES/consult w/ pt's family directly relating to pts condition/interpretation of diagnostic studies/consultation with other physicians/direct patient care (not related to procedure)

## 2018-01-07 NOTE — H&P ADULT - ASSESSMENT
patient with UTI/septic shock 74 year old female patient with UTI/septic shock requiring IV pressors to support MAP     Critical Care time: 35 mins assessing presenting problems of acute illness that poses high probability of life threatening deterioration or end organ damage/dysfunction.  Medical decision making inculding Initiating plan of care, reviewing data, reviewing radiology,direct patient bedside evaluation and interpretation of vital signs, any necessary ventilator management , discusion with multidisciplinary team, discussing goals of care with patient/family, all non inclusive of procedures

## 2018-01-08 DIAGNOSIS — I48.91 UNSPECIFIED ATRIAL FIBRILLATION: ICD-10-CM

## 2018-01-08 DIAGNOSIS — A41.9 SEPSIS, UNSPECIFIED ORGANISM: ICD-10-CM

## 2018-01-08 LAB
ANION GAP SERPL CALC-SCNC: 11 MMOL/L — SIGNIFICANT CHANGE UP (ref 5–17)
BUN SERPL-MCNC: 29 MG/DL — HIGH (ref 8–20)
CALCIUM SERPL-MCNC: 8.4 MG/DL — LOW (ref 8.6–10.2)
CHLORIDE SERPL-SCNC: 107 MMOL/L — SIGNIFICANT CHANGE UP (ref 98–107)
CO2 SERPL-SCNC: 23 MMOL/L — SIGNIFICANT CHANGE UP (ref 22–29)
CREAT SERPL-MCNC: 1.83 MG/DL — HIGH (ref 0.5–1.3)
GLUCOSE SERPL-MCNC: 138 MG/DL — HIGH (ref 70–115)
HCT VFR BLD CALC: 36.2 % — LOW (ref 37–47)
HGB BLD-MCNC: 11.5 G/DL — LOW (ref 12–16)
LACTATE SERPL-SCNC: 1.4 MMOL/L — SIGNIFICANT CHANGE UP (ref 0.5–2)
MAGNESIUM SERPL-MCNC: 1.8 MG/DL — SIGNIFICANT CHANGE UP (ref 1.6–2.6)
MCHC RBC-ENTMCNC: 29.6 PG — SIGNIFICANT CHANGE UP (ref 27–31)
MCHC RBC-ENTMCNC: 31.8 G/DL — LOW (ref 32–36)
MCV RBC AUTO: 93.3 FL — SIGNIFICANT CHANGE UP (ref 81–99)
PHOSPHATE SERPL-MCNC: 2.6 MG/DL — SIGNIFICANT CHANGE UP (ref 2.4–4.7)
PLATELET # BLD AUTO: 220 K/UL — SIGNIFICANT CHANGE UP (ref 150–400)
POTASSIUM SERPL-MCNC: 4.1 MMOL/L — SIGNIFICANT CHANGE UP (ref 3.5–5.3)
POTASSIUM SERPL-SCNC: 4.1 MMOL/L — SIGNIFICANT CHANGE UP (ref 3.5–5.3)
RBC # BLD: 3.88 M/UL — LOW (ref 4.4–5.2)
RBC # FLD: 15.6 % — SIGNIFICANT CHANGE UP (ref 11–15.6)
SODIUM SERPL-SCNC: 141 MMOL/L — SIGNIFICANT CHANGE UP (ref 135–145)
WBC # BLD: 7.5 K/UL — SIGNIFICANT CHANGE UP (ref 4.8–10.8)
WBC # FLD AUTO: 7.5 K/UL — SIGNIFICANT CHANGE UP (ref 4.8–10.8)

## 2018-01-08 PROCEDURE — 99291 CRITICAL CARE FIRST HOUR: CPT

## 2018-01-08 PROCEDURE — 71045 X-RAY EXAM CHEST 1 VIEW: CPT | Mod: 26

## 2018-01-08 RX ORDER — VANCOMYCIN HCL 1 G
VIAL (EA) INTRAVENOUS
Qty: 0 | Refills: 0 | Status: DISCONTINUED | OUTPATIENT
Start: 2018-01-08 | End: 2018-01-08

## 2018-01-08 RX ORDER — ASCORBIC ACID 60 MG
1500 TABLET,CHEWABLE ORAL EVERY 6 HOURS
Qty: 0 | Refills: 0 | Status: DISCONTINUED | OUTPATIENT
Start: 2018-01-08 | End: 2018-01-09

## 2018-01-08 RX ORDER — VANCOMYCIN HCL 1 G
1000 VIAL (EA) INTRAVENOUS EVERY 24 HOURS
Qty: 0 | Refills: 0 | Status: DISCONTINUED | OUTPATIENT
Start: 2018-01-09 | End: 2018-01-10

## 2018-01-08 RX ORDER — HYDROCORTISONE 20 MG
50 TABLET ORAL EVERY 6 HOURS
Qty: 0 | Refills: 0 | Status: DISCONTINUED | OUTPATIENT
Start: 2018-01-08 | End: 2018-01-09

## 2018-01-08 RX ORDER — CEFEPIME 1 G/1
INJECTION, POWDER, FOR SOLUTION INTRAMUSCULAR; INTRAVENOUS
Qty: 0 | Refills: 0 | Status: COMPLETED | OUTPATIENT
Start: 2018-01-08 | End: 2018-01-12

## 2018-01-08 RX ORDER — PANTOPRAZOLE SODIUM 20 MG/1
40 TABLET, DELAYED RELEASE ORAL DAILY
Qty: 0 | Refills: 0 | Status: DISCONTINUED | OUTPATIENT
Start: 2018-01-08 | End: 2018-01-09

## 2018-01-08 RX ORDER — CEFEPIME 1 G/1
2000 INJECTION, POWDER, FOR SOLUTION INTRAMUSCULAR; INTRAVENOUS ONCE
Qty: 0 | Refills: 0 | Status: COMPLETED | OUTPATIENT
Start: 2018-01-08 | End: 2018-01-08

## 2018-01-08 RX ORDER — THIAMINE MONONITRATE (VIT B1) 100 MG
200 TABLET ORAL DAILY
Qty: 0 | Refills: 0 | Status: DISCONTINUED | OUTPATIENT
Start: 2018-01-08 | End: 2018-01-08

## 2018-01-08 RX ORDER — HEPARIN SODIUM 5000 [USP'U]/ML
5000 INJECTION INTRAVENOUS; SUBCUTANEOUS EVERY 12 HOURS
Qty: 0 | Refills: 0 | Status: DISCONTINUED | OUTPATIENT
Start: 2018-01-08 | End: 2018-01-16

## 2018-01-08 RX ORDER — DIGOXIN 250 MCG
0.25 TABLET ORAL EVERY 8 HOURS
Qty: 0 | Refills: 0 | Status: COMPLETED | OUTPATIENT
Start: 2018-01-08 | End: 2018-01-09

## 2018-01-08 RX ORDER — DIGOXIN 250 MCG
0.5 TABLET ORAL ONCE
Qty: 0 | Refills: 0 | Status: COMPLETED | OUTPATIENT
Start: 2018-01-08 | End: 2018-01-08

## 2018-01-08 RX ORDER — SODIUM CHLORIDE 9 MG/ML
500 INJECTION, SOLUTION INTRAVENOUS ONCE
Qty: 0 | Refills: 0 | Status: COMPLETED | OUTPATIENT
Start: 2018-01-08 | End: 2018-01-08

## 2018-01-08 RX ORDER — THIAMINE MONONITRATE (VIT B1) 100 MG
100 TABLET ORAL
Qty: 0 | Refills: 0 | Status: DISCONTINUED | OUTPATIENT
Start: 2018-01-08 | End: 2018-01-09

## 2018-01-08 RX ORDER — METOPROLOL TARTRATE 50 MG
5 TABLET ORAL ONCE
Qty: 0 | Refills: 0 | Status: COMPLETED | OUTPATIENT
Start: 2018-01-08 | End: 2018-01-08

## 2018-01-08 RX ORDER — CEFEPIME 1 G/1
2000 INJECTION, POWDER, FOR SOLUTION INTRAMUSCULAR; INTRAVENOUS EVERY 24 HOURS
Qty: 0 | Refills: 0 | Status: COMPLETED | OUTPATIENT
Start: 2018-01-09 | End: 2018-01-12

## 2018-01-08 RX ADMIN — Medication 0.5 MILLIGRAM(S): at 10:19

## 2018-01-08 RX ADMIN — SODIUM CHLORIDE 3000 MILLILITER(S): 9 INJECTION, SOLUTION INTRAVENOUS at 11:23

## 2018-01-08 RX ADMIN — Medication 103 MILLIGRAM(S): at 11:22

## 2018-01-08 RX ADMIN — Medication 103 MILLIGRAM(S): at 23:11

## 2018-01-08 RX ADMIN — SODIUM CHLORIDE 100 MILLILITER(S): 9 INJECTION, SOLUTION INTRAVENOUS at 23:13

## 2018-01-08 RX ADMIN — Medication 5 MILLIGRAM(S): at 04:10

## 2018-01-08 RX ADMIN — Medication 101 MILLIGRAM(S): at 18:49

## 2018-01-08 RX ADMIN — Medication 101 MILLIGRAM(S): at 11:24

## 2018-01-08 RX ADMIN — CEFEPIME 100 MILLIGRAM(S): 1 INJECTION, POWDER, FOR SOLUTION INTRAMUSCULAR; INTRAVENOUS at 15:21

## 2018-01-08 RX ADMIN — Medication 0.25 MILLIGRAM(S): at 18:45

## 2018-01-08 RX ADMIN — Medication 50 MILLIGRAM(S): at 23:11

## 2018-01-08 RX ADMIN — Medication 103 MILLIGRAM(S): at 18:49

## 2018-01-08 RX ADMIN — Medication 50 MILLIGRAM(S): at 18:45

## 2018-01-08 RX ADMIN — PIPERACILLIN AND TAZOBACTAM 25 GRAM(S): 4; .5 INJECTION, POWDER, LYOPHILIZED, FOR SOLUTION INTRAVENOUS at 00:11

## 2018-01-08 RX ADMIN — HEPARIN SODIUM 5000 UNIT(S): 5000 INJECTION INTRAVENOUS; SUBCUTANEOUS at 18:45

## 2018-01-08 RX ADMIN — Medication 50 MILLIGRAM(S): at 11:40

## 2018-01-08 RX ADMIN — SODIUM CHLORIDE 100 MILLILITER(S): 9 INJECTION, SOLUTION INTRAVENOUS at 02:55

## 2018-01-08 RX ADMIN — Medication 250 MILLIGRAM(S): at 00:12

## 2018-01-08 RX ADMIN — PANTOPRAZOLE SODIUM 40 MILLIGRAM(S): 20 TABLET, DELAYED RELEASE ORAL at 11:41

## 2018-01-08 RX ADMIN — PIPERACILLIN AND TAZOBACTAM 25 GRAM(S): 4; .5 INJECTION, POWDER, LYOPHILIZED, FOR SOLUTION INTRAVENOUS at 05:55

## 2018-01-08 NOTE — PROGRESS NOTE ADULT - PROBLEM SELECTOR PLAN 2
volume challenge  continue on Levophed, will titrate for MAP 65-70  trend lactate  -blood/urine/sputum cultures, then initiate broad spectrum antibiotics  -follow cultures and narrow antibitoics as able  -goal UOP >0.5 cc/kg/hr  -procalcitonin

## 2018-01-08 NOTE — PROGRESS NOTE ADULT - SUBJECTIVE AND OBJECTIVE BOX
Patient is a 74y old  Female who presents with a chief complaint of unresponsive and apnea (2018 23:26)      BRIEF HOSPITAL COURSE: 74 year old female with PMHx of afib , mvp and breast cancer was found to be lethargic by family EMS called who apparently palpated thready pulses and started cpr for few seconds per family when pt woke up. She was transferred by ambulance and came into ER with altered mental status and hypothermic found to be hypotensive . In the ER with brown mucus filled urine on placement of King catheter, she opens eyes to verbal and follows commands is managing her airway fine but does have peroids of lethargy      Events last 24 hours: on levophed, AF w RVR, rewarmed    PAST MEDICAL & SURGICAL HISTORY:  Afib  Breast cancer  Mitral valve prolapse  No significant past surgical history      Review of Systems:  Cannot be obtained pt not verbalizing       Medications:  cefepime  IVPB        digoxin  Injectable 0.25 milliGRAM(s) IV Push every 8 hours  norepinephrine Infusion 0.3 MICROgram(s)/kG/Min IV Continuous <Continuous>          heparin  Injectable 5000 Unit(s) SubCutaneous every 12 hours    pantoprazole  Injectable 40 milliGRAM(s) IV Push daily      hydrocortisone sodium succinate Injectable 50 milliGRAM(s) IV Push every 6 hours    ascorbic acid IVPB 1500 milliGRAM(s) IV Intermittent every 6 hours  multiple electrolytes Injection Type 1 1000 milliLiter(s) IV Continuous <Continuous>  thiamine IVPB 100 milliGRAM(s) IV Intermittent two times a day                ICU Vital Signs Last 24 Hrs  T(C): 37.1 (2018 20:00), Max: 37.3 (2018 11:31)  T(F): 98.8 (2018 20:00), Max: 99.1 (2018 11:31)  HR: 86 (2018 21:00) (78 - 145)  BP: 89/54 (2018 21:00) (50/35 - 165/70)  BP(mean): 67 (2018 21:00) (40 - 119)  ABP: --  ABP(mean): --  RR: 19 (2018 21:00) (0 - 31)  SpO2: 100% (2018 21:00) (69% - 100%)          I&O's Detail    2018 07:01  -  2018 07:00  --------------------------------------------------------  IN:    multiple electrolytes Injection Type 1: 800 mL    norepinephrine Infusion: 38.7 mL    Solution: 250 mL    Solution: 150 mL  Total IN: 1238.7 mL    OUT:    Indwelling Catheter - Urethral: 84 mL  Total OUT: 84 mL    Total NET: 1154.7 mL      2018 07:  -  2018 22:10  --------------------------------------------------------  IN:    IV PiggyBack: 400 mL    multiple electrolytes Injection Type 1: 1600 mL    norepinephrine Infusion: 62.3 mL    Solution: 150 mL    Solution: 100 mL  Total IN: 2312.3 mL    OUT:    Indwelling Catheter - Urethral: 127 mL  Total OUT: 127 mL    Total NET: 2185.3 mL            LABS:                        11.5   7.5   )-----------( 220      ( 2018 03:34 )             36.2     01-08    141  |  107  |  29.0<H>  ----------------------------<  138<H>  4.1   |  23.0  |  1.83<H>    Ca    8.4<L>      2018 03:34  Phos  2.6     -08  Mg     1.8     -    TPro  7.7  /  Alb  3.1<L>  /  TBili  0.2<L>  /  DBili  x   /  AST  33<H>  /  ALT  24  /  AlkPhos  126<H>  01-07      CARDIAC MARKERS ( 2018 16:33 )  x     / 0.09 ng/mL / 89 U/L / x     / x          CAPILLARY BLOOD GLUCOSE      POCT Blood Glucose.: 123 mg/dL (2018 16:12)    PT/INR - ( 2018 16:33 )   PT: 87.2 sec;   INR: 7.61 ratio         PTT - ( 2018 16:33 )  PTT:56.6 sec  Urinalysis Basic - ( 2018 20:53 )    Color: Yellow / Appearance: Slightly Turbid / S.025 / pH: x  Gluc: x / Ketone: Trace  / Bili: Small / Urobili: 1 mg/dL   Blood: x / Protein: 100 mg/dL / Nitrite: Negative   Leuk Esterase: Trace / RBC: 11-25 /HPF / WBC 3-5   Sq Epi: x / Non Sq Epi: Moderate / Bacteria: Many      CULTURES:      Physical Examination:    General: No acute distress.      HEENT: Pupils equal, reactive to light.  Symmetric.    PULM: Clear to auscultation bilaterally, no significant sputum production    CVS: Rapid irregular + systolic murmur    ABD: Soft, nondistended, nontender, normoactive bowel sounds, no masses    EXT: No edema, nontender    SKIN: Warm and well perfused, no rashes noted.    NEURO: Alert, oriented, interactive, nonfocal    RADIOLOGY:   < from: Xray Chest 1 View AP- PORTABLE-Urgent (18 @ 01:39) >    FINDINGS:    Single frontal view of the chest demonstrates the lungs to be clear. The   cardiomediastinal silhouette is normal. No acute osseous abnormalities.   Overlying EKG leads and wires are noted. Right central jugular central   venous catheter tip in the distal SVC.    IMPRESSION: No acute cardiopulmonary disease process.    < end of copied text >    CRITICAL CARE TIME SPENT: 60

## 2018-01-08 NOTE — PROCEDURE NOTE - ADDITIONAL PROCEDURE DETAILS
Line established to support IV pressors for septic shock and IV antibiotics with poor venous accesses   Xray reviewed by me finding cath tip located in superior vena cava

## 2018-01-08 NOTE — PROGRESS NOTE ADULT - PROBLEM SELECTOR PLAN 1
changed zosyn to cefepime continue vanco until cultures come back  huffman   goal UOP >0.5 cc/kg/hr  follow cultures

## 2018-01-09 DIAGNOSIS — F03.90 UNSPECIFIED DEMENTIA WITHOUT BEHAVIORAL DISTURBANCE: ICD-10-CM

## 2018-01-09 DIAGNOSIS — R62.7 ADULT FAILURE TO THRIVE: ICD-10-CM

## 2018-01-09 DIAGNOSIS — Z71.89 OTHER SPECIFIED COUNSELING: ICD-10-CM

## 2018-01-09 LAB
ABO RH CONFIRMATION: SIGNIFICANT CHANGE UP
ANION GAP SERPL CALC-SCNC: 15 MMOL/L — SIGNIFICANT CHANGE UP (ref 5–17)
APTT BLD: 31.3 SEC — SIGNIFICANT CHANGE UP (ref 27.5–37.4)
BUN SERPL-MCNC: 24 MG/DL — HIGH (ref 8–20)
CALCIUM SERPL-MCNC: 8.2 MG/DL — LOW (ref 8.6–10.2)
CHLORIDE SERPL-SCNC: 107 MMOL/L — SIGNIFICANT CHANGE UP (ref 98–107)
CO2 SERPL-SCNC: 21 MMOL/L — LOW (ref 22–29)
CREAT SERPL-MCNC: 1.54 MG/DL — HIGH (ref 0.5–1.3)
CULTURE RESULTS: SIGNIFICANT CHANGE UP
DIGOXIN SERPL-MCNC: 5.2 NG/ML — CRITICAL HIGH (ref 0.8–2)
DIGOXIN SERPL-MCNC: >8 NG/ML — CRITICAL HIGH (ref 0.8–2)
GLUCOSE SERPL-MCNC: 80 MG/DL — SIGNIFICANT CHANGE UP (ref 70–115)
HCT VFR BLD CALC: 30.7 % — LOW (ref 37–47)
HGB BLD-MCNC: 9.7 G/DL — LOW (ref 12–16)
INR BLD: 1.35 RATIO — HIGH (ref 0.88–1.16)
MCHC RBC-ENTMCNC: 29.8 PG — SIGNIFICANT CHANGE UP (ref 27–31)
MCHC RBC-ENTMCNC: 31.6 G/DL — LOW (ref 32–36)
MCV RBC AUTO: 94.5 FL — SIGNIFICANT CHANGE UP (ref 81–99)
PLATELET # BLD AUTO: 181 K/UL — SIGNIFICANT CHANGE UP (ref 150–400)
POTASSIUM SERPL-MCNC: 4.3 MMOL/L — SIGNIFICANT CHANGE UP (ref 3.5–5.3)
POTASSIUM SERPL-SCNC: 4.3 MMOL/L — SIGNIFICANT CHANGE UP (ref 3.5–5.3)
PROTHROM AB SERPL-ACNC: 14.9 SEC — HIGH (ref 9.8–12.7)
RBC # BLD: 3.25 M/UL — LOW (ref 4.4–5.2)
RBC # FLD: 16 % — HIGH (ref 11–15.6)
SODIUM SERPL-SCNC: 143 MMOL/L — SIGNIFICANT CHANGE UP (ref 135–145)
SPECIMEN SOURCE: SIGNIFICANT CHANGE UP
WBC # BLD: 7.4 K/UL — SIGNIFICANT CHANGE UP (ref 4.8–10.8)
WBC # FLD AUTO: 7.4 K/UL — SIGNIFICANT CHANGE UP (ref 4.8–10.8)

## 2018-01-09 PROCEDURE — 99222 1ST HOSP IP/OBS MODERATE 55: CPT

## 2018-01-09 PROCEDURE — 99233 SBSQ HOSP IP/OBS HIGH 50: CPT

## 2018-01-09 PROCEDURE — 93010 ELECTROCARDIOGRAM REPORT: CPT

## 2018-01-09 PROCEDURE — 93306 TTE W/DOPPLER COMPLETE: CPT | Mod: 26

## 2018-01-09 PROCEDURE — 99223 1ST HOSP IP/OBS HIGH 75: CPT

## 2018-01-09 RX ORDER — DONEPEZIL HYDROCHLORIDE 10 MG/1
10 TABLET, FILM COATED ORAL AT BEDTIME
Qty: 0 | Refills: 0 | Status: DISCONTINUED | OUTPATIENT
Start: 2018-01-09 | End: 2018-01-10

## 2018-01-09 RX ORDER — HYDROCORTISONE 20 MG
50 TABLET ORAL EVERY 12 HOURS
Qty: 0 | Refills: 0 | Status: COMPLETED | OUTPATIENT
Start: 2018-01-09 | End: 2018-01-09

## 2018-01-09 RX ORDER — LEVOTHYROXINE SODIUM 125 MCG
50 TABLET ORAL
Qty: 0 | Refills: 0 | Status: DISCONTINUED | OUTPATIENT
Start: 2018-01-09 | End: 2018-01-11

## 2018-01-09 RX ORDER — PANTOPRAZOLE SODIUM 20 MG/1
40 TABLET, DELAYED RELEASE ORAL DAILY
Qty: 0 | Refills: 0 | Status: COMPLETED | OUTPATIENT
Start: 2018-01-09 | End: 2018-01-11

## 2018-01-09 RX ORDER — MEMANTINE HYDROCHLORIDE 10 MG/1
10 TABLET ORAL
Qty: 0 | Refills: 0 | Status: DISCONTINUED | OUTPATIENT
Start: 2018-01-09 | End: 2018-01-10

## 2018-01-09 RX ORDER — LEVETIRACETAM 250 MG/1
250 TABLET, FILM COATED ORAL EVERY 24 HOURS
Qty: 0 | Refills: 0 | Status: DISCONTINUED | OUTPATIENT
Start: 2018-01-09 | End: 2018-01-09

## 2018-01-09 RX ORDER — LEVETIRACETAM 250 MG/1
250 TABLET, FILM COATED ORAL EVERY 12 HOURS
Qty: 0 | Refills: 0 | Status: DISCONTINUED | OUTPATIENT
Start: 2018-01-09 | End: 2018-01-11

## 2018-01-09 RX ORDER — HYDROCORTISONE 20 MG
50 TABLET ORAL
Qty: 0 | Refills: 0 | Status: COMPLETED | OUTPATIENT
Start: 2018-01-10 | End: 2018-01-10

## 2018-01-09 RX ADMIN — Medication 0.25 MILLIGRAM(S): at 01:38

## 2018-01-09 RX ADMIN — Medication 50 MILLIGRAM(S): at 17:20

## 2018-01-09 RX ADMIN — LEVETIRACETAM 400 MILLIGRAM(S): 250 TABLET, FILM COATED ORAL at 10:50

## 2018-01-09 RX ADMIN — LEVETIRACETAM 400 MILLIGRAM(S): 250 TABLET, FILM COATED ORAL at 21:48

## 2018-01-09 RX ADMIN — MEMANTINE HYDROCHLORIDE 10 MILLIGRAM(S): 10 TABLET ORAL at 17:17

## 2018-01-09 RX ADMIN — HEPARIN SODIUM 5000 UNIT(S): 5000 INJECTION INTRAVENOUS; SUBCUTANEOUS at 05:34

## 2018-01-09 RX ADMIN — SODIUM CHLORIDE 100 MILLILITER(S): 9 INJECTION, SOLUTION INTRAVENOUS at 05:35

## 2018-01-09 RX ADMIN — HEPARIN SODIUM 5000 UNIT(S): 5000 INJECTION INTRAVENOUS; SUBCUTANEOUS at 17:17

## 2018-01-09 RX ADMIN — PANTOPRAZOLE SODIUM 40 MILLIGRAM(S): 20 TABLET, DELAYED RELEASE ORAL at 11:15

## 2018-01-09 RX ADMIN — Medication 50 MICROGRAM(S): at 10:48

## 2018-01-09 RX ADMIN — Medication 103 MILLIGRAM(S): at 05:35

## 2018-01-09 RX ADMIN — CEFEPIME 100 MILLIGRAM(S): 1 INJECTION, POWDER, FOR SOLUTION INTRAMUSCULAR; INTRAVENOUS at 11:15

## 2018-01-09 RX ADMIN — Medication 50 MILLIGRAM(S): at 05:34

## 2018-01-09 RX ADMIN — Medication 101 MILLIGRAM(S): at 05:35

## 2018-01-09 RX ADMIN — Medication 250 MILLIGRAM(S): at 04:28

## 2018-01-09 NOTE — CONSULT NOTE ADULT - SUBJECTIVE AND OBJECTIVE BOX
[facility] Card[facility]    CC: A Fib     HPI: 74 year old female with PMHx of afib , mvp and breast cancer was found to be lethargic by family EMS called who apparently palpated thready pulses and started cpr for few seconds per family when pt woke up. She was transferred by ambulance and came into ER with altered mental status and hypothermic found to be hypotensive . In the ER with brown mucus filled urine on placement of King catheter, she opens eyes to verbal and follows commands is managing her airway fine but does have peroids of lethargy.  Above reviewed and noted : Patient in Bed NAD, daughter at bed side. Patient non-verbal but will respond to verbal interactions with grunts and eye contact.  Will eval for question of Coumadin therapy      PAST MEDICAL & SURGICAL HISTORY:  Afib  Breast cancer  Mitral valve prolapse  No significant past surgical history      REVIEW OF SYSTEMS:    CONSTITUTIONAL: Patient non-verbal unable to obtain ROS    EYES:   ENMT:   NECK:   BREASTS:   RESPIRATORY:  CARDIOVASCULAR:   GASTROINTESTINAL:   GENITOURINARY:   NEUROLOGICAL:   SKIN:   LYMPH NODES:   ENDOCRINE:   MUSCULOSKELETAL:   PSYCHIATRIC:   HEME/LYMPH:   ALLERY AND IMMUNOLOGIC:     Allergies    No Known Drug Allergies  shellfish (Hives)    Intolerances    P/S: per daughter non smoket no ETOH    Family Hx: Breast and spinal cancer             Alzheimers    MEDICATIONS  (STANDING):  cefepime  IVPB 2000 milliGRAM(s) IV Intermittent every 24 hours  cefepime  IVPB      heparin  Injectable 5000 Unit(s) SubCutaneous every 12 hours  hydrocortisone sodium succinate Injectable 50 milliGRAM(s) IV Push every 12 hours  levETIRAcetam  IVPB 250 milliGRAM(s) IV Intermittent every 24 hours  levothyroxine Injectable 50 MICROGram(s) IV Push <User Schedule>  multiple electrolytes Injection Type 1 1000 milliLiter(s) (100 mL/Hr) IV Continuous <Continuous>  pantoprazole   Suspension 40 milliGRAM(s) Oral daily  vancomycin  IVPB 1000 milliGRAM(s) IV Intermittent every 24 hours    MEDICATIONS  (PRN):      Vital Signs Last 24 Hrs  T(C): 36.5 (2018 11:39), Max: 37.1 (2018 20:00)  T(F): 97.7 (2018 11:39), Max: 98.8 (2018 20:00)  HR: 84 (2018 11:25) (53 - 132)  BP: 122/59 (2018 11:25) (86/50 - 165/70)  BP(mean): 84 (2018 11:25) (61 - 115)  RR: 15 (2018 11:25) (12 - 30)  SpO2: 92% (2018 11:25) (89% - 100%)    PHYSICAL EXAM:  GENERAL: NAD  HEAD:  Atraumatic, Normocephalic  EYES: PERRLA, conjunctiva and sclera clear  ENMT:  Moist mucous membranes  NECK: Supple, No JVD  NERVOUS SYSTEM:  Alert & Oriented X1, non-verbal respons to verbal contact with eye movement and grunts  CHEST/LUNG: Clear to percussion bilaterally; No rales, rhonchi, wheezing, or rubs  HEART: RRR, murmur c/w AS   ABDOMEN: Soft, Nontender, Nondistended; Bowel sounds present  EXTREMITIES:  2+ Peripheral Pulses, No clubbing, cyanosis, or edema  LYMPH: No lymphadenopathy noted  SKIN: No rashes or lesions    Labs:  CARDIAC MARKERS ( 2018 16:33 )  x     / 0.09 ng/mL / 89 U/L / x     / x                             9.7    7.4   )-----------( 181      ( 2018 05:30 )             30.7     2018 05:30    143    |  107    |  24.0   ----------------------------<  80     4.3     |  21.0   |  1.54     Ca    8.2        2018 05:30  Phos  2.6       2018 03:34  Mg     1.8       2018 03:34    TPro  7.7    /  Alb  3.1    /  TBili  0.2    /  DBili  x      /  AST  33     /  ALT  24     /  AlkPhos  126    2018 16:33    PT/INR - ( 2018 05:30 )   PT: 14.9 sec;   INR: 1.35 ratio         PTT - ( 2018 05:30 )  PTT:31.3 sec  CAPILLARY BLOOD GLUCOSE        LIVER FUNCTIONS - ( 2018 16:33 )  Alb: 3.1 g/dL / Pro: 7.7 g/dL / ALK PHOS: 126 U/L / ALT: 24 U/L / AST: 33 U/L / GGT: x           Urinalysis Basic - ( 2018 20:53 )    Color: Yellow / Appearance: Slightly Turbid / S.025 / pH: x  Gluc: x / Ketone: Trace  / Bili: Small / Urobili: 1 mg/dL   Blood: x / Protein: 100 mg/dL / Nitrite: Negative   Leuk Esterase: Trace / RBC: 11-25 /HPF / WBC 3-5   Sq Epi: x / Non Sq Epi: Moderate / Bacteria: Many    EKG: NSR rate 90 T wave inversion V4 thru V6, no acute ST elevations depressions.    INTERPRETATION:  TECHNIQUE: Single portable view of the chest.    COMPARISON: 2018    CLINICAL HISTORY: Line placement.     FINDINGS:    Single frontal view of the chest demonstrates the lungs to be clear. The   cardiomediastinal silhouette is normal. No acute osseous abnormalities.   Overlying EKG leads and wires are noted. Right central jugular central   venous catheter tip in the distal SVC.    IMPRESSION: No acute cardiopulmonary disease process.

## 2018-01-09 NOTE — PROGRESS NOTE ADULT - PROBLEM SELECTOR PLAN 2
off levophed   follow cultures and narrow antibiotics as able, urine cx w corynebacterium  sensitivities pending

## 2018-01-09 NOTE — DIETITIAN INITIAL EVALUATION ADULT. - OTHER INFO
Pt admitted from septic shock due to UTI. Per daughter, pt eats puree with nectar thick liquids normally. Pt occasionally drinks Ensure at home, daughter reports pt likes "sweet things." Agreeable to trying Ensure pudding at this time.

## 2018-01-09 NOTE — PROGRESS NOTE ADULT - PROBLEM SELECTOR PLAN 3
digoxin loaded yesterday for refractory AF w RVR and now having periods of bradycardia  Digoxin level high no further digoxin ordered  restarted synthroid  pt home meds list given by family- pt on coreg 6.25mg q12

## 2018-01-09 NOTE — CONSULT NOTE ADULT - ASSESSMENT
The patient is a 74y Female with baseline dementia now lethargic.     Will continue Keppra for now. May need to increase dose if any further suspicious episodes.     Will obtain EEG.

## 2018-01-09 NOTE — PROGRESS NOTE ADULT - PROBLEM SELECTOR PLAN 4
on donepezil and memantadine at home having periods of inattention which was diagnosed as seizure and being treated with Keppra.   neuro eval

## 2018-01-09 NOTE — PROGRESS NOTE ADULT - SUBJECTIVE AND OBJECTIVE BOX
Patient is a 84y old  Female who presents with a chief complaint of unresponsive and apnea (07 Jan 2018 23:26)    PAST MEDICAL & SURGICAL HISTORY:  Afib  Breast cancer  Mitral valve prolapse  No significant past surgical history    HIPOLITO AGUILAR   84y    Female    BRIEF HOSPITAL COURSE:    Review of Systems:                       All other ROS are negative.    ICU Vital Signs Last 24 Hrs  T(C): 36.3 (09 Jan 2018 21:00), Max: 37.1 (09 Jan 2018 04:27)  T(F): 97.4 (09 Jan 2018 21:00), Max: 98.8 (09 Jan 2018 04:27)  HR: 62 (09 Jan 2018 22:00) (53 - 110)  BP: 136/58 (09 Jan 2018 22:00) (90/49 - 161/91)  BP(mean): 83 (09 Jan 2018 22:00) (61 - 115)  ABP: --  ABP(mean): --  RR: 23 (09 Jan 2018 22:00) (12 - 30)  SpO2: 100% (09 Jan 2018 22:00) (92% - 100%)    Physical Examination:    General:     HEENT:     PULM:     CVS:     ABD:     EXT:     SKIN:     Neuro:          LABS:                        9.7    7.4   )-----------( 181      ( 09 Jan 2018 05:30 )             30.7     01-09    143  |  107  |  24.0<H>  ----------------------------<  80  4.3   |  21.0<L>  |  1.54<H>    Ca    8.2<L>      09 Jan 2018 05:30  Phos  2.6     01-08  Mg     1.8     01-08            CAPILLARY BLOOD GLUCOSE          PT/INR - ( 09 Jan 2018 05:30 )   PT: 14.9 sec;   INR: 1.35 ratio         PTT - ( 09 Jan 2018 05:30 )  PTT:31.3 sec    CULTURES:  Culture Results:   50,000 - 99,000 CFU/mL Corynebacterium species (01-08 @ 03:15)      Medications:  cefepime  IVPB 2000 milliGRAM(s) IV Intermittent every 24 hours  cefepime  IVPB      vancomycin  IVPB 1000 milliGRAM(s) IV Intermittent every 24 hours        donepezil 10 milliGRAM(s) Oral at bedtime  levETIRAcetam  IVPB 250 milliGRAM(s) IV Intermittent every 12 hours  LORazepam     Tablet 0.5 milliGRAM(s) Oral two times a day PRN  memantine 10 milliGRAM(s) Oral two times a day      heparin  Injectable 5000 Unit(s) SubCutaneous every 12 hours    pantoprazole   Suspension 40 milliGRAM(s) Oral daily  levothyroxine Injectable 50 MICROGram(s) IV Push <User Schedule>        01-08 @ 07:01  -  01-09 @ 07:00  --------------------------------------------------------  IN: 4062.3 mL / OUT: 391 mL / NET: 3671.3 mL    01-09 @ 07:01  - 01-09 @ 22:55  --------------------------------------------------------  IN: 1100 mL / OUT: 145 mL / NET: 955 mL        RADIOLOGY/IMAGING/ECHO    Critical care point of care ultrasound:    Assessment/Plan:        Minutes of Critical Care time:   (Reviewing data, imaging, discussing with multidisciplinary team, non inclusive of procedures, discussing goals of care with patient/family) Patient is a 84y old  Female who presents with a chief complaint of unresponsive and apnea (07 Jan 2018 23:26)    PAST MEDICAL & SURGICAL HISTORY:  Afib  Breast cancer  Mitral valve prolapse  No significant past surgical history    HIPOLITO AGUILAR   84y    Female    BRIEF HOSPITAL COURSE:     74 year old female with PMHx of afib , mvp and breast cancer was found to be lethargic by family EMS called who apparently palpated thready pulses and started cpr for few seconds per family when pt woke up. She was transferred by ambulance and came into ER with altered mental status and hypothermic found to be hypotensive . Found to be septic from UTI with shock and AMS, admitted to ICU on levophed    Events last 24 hours: off levophed, remains lethargic/altered, periods of bradycardia interspersed with AF w RVR  now SR.  Still with AMS  not at baseline       Review of Systems:     UATO                All other ROS are negative.    ICU Vital Signs Last 24 Hrs  T(C): 36.3 (09 Jan 2018 21:00), Max: 37.1 (09 Jan 2018 04:27)  T(F): 97.4 (09 Jan 2018 21:00), Max: 98.8 (09 Jan 2018 04:27)  HR: 62 (09 Jan 2018 22:00) (53 - 110)  BP: 136/58 (09 Jan 2018 22:00) (90/49 - 161/91)  BP(mean): 83 (09 Jan 2018 22:00) (61 - 115)  ABP: --  ABP(mean): --  RR: 23 (09 Jan 2018 22:00) (12 - 30)  SpO2: 100% (09 Jan 2018 22:00) (92% - 100%)    Physical Examination:    General:  NAD    HEENT: RIJ TLC     PULM:  bilateral BS    CVS:  s1 s2 reg 2/6 JASPAL LSB    ABD: soft     EXT:  no edema     SKIN:  warm    Neuro: moves 4 ext to pain lethargic      LABS:                        9.7    7.4   )-----------( 181      ( 09 Jan 2018 05:30 )             30.7     01-09    143  |  107  |  24.0<H>  ----------------------------<  80  4.3   |  21.0<L>  |  1.54<H>    Ca    8.2<L>      09 Jan 2018 05:30  Phos  2.6     01-08  Mg     1.8     01-08      PT/INR - ( 09 Jan 2018 05:30 )   PT: 14.9 sec;   INR: 1.35 ratio         PTT - ( 09 Jan 2018 05:30 )  PTT:31.3 sec    CULTURES:  Culture Results:   50,000 - 99,000 CFU/mL Corynebacterium species (01-08 @ 03:15)      Medications:  cefepime  IVPB 2000 milliGRAM(s) IV Intermittent every 24 hours  cefepime  IVPB      vancomycin  IVPB 1000 milliGRAM(s) IV Intermittent every 24 hours  donepezil 10 milliGRAM(s) Oral at bedtime  levETIRAcetam  IVPB 250 milliGRAM(s) IV Intermittent every 12 hours  LORazepam     Tablet 0.5 milliGRAM(s) Oral two times a day PRN  memantine 10 milliGRAM(s) Oral two times a day  heparin  Injectable 5000 Unit(s) SubCutaneous every 12 hours  pantoprazole   Suspension 40 milliGRAM(s) Oral daily  levothyroxine Injectable 50 MICROGram(s) IV Push <User Schedule>        01-08 @ 07:01  -  01-09 @ 07:00  --------------------------------------------------------  IN: 4062.3 mL / OUT: 391 mL / NET: 3671.3 mL    01-09 @ 07:01  -  01-09 @ 22:55  --------------------------------------------------------  IN: 1100 mL / OUT: 145 mL / NET: 955 mL      RADIOLOGY/IMAGING/ECHO    < from: CT Head No Cont (01.07.18 @ 16:31) >    IMPRESSION:    No acute intracranial hemorrhage, mass effect, or evidence of acute   territorial infarct.   If clinical symptoms persist, recommend follow-up imaging with MRI brain   if there are no contraindications.    < end of copied text >      Critical care point of care ultrasound:    Assessment/Plan:    84 year old female  hx dementia  afib breast ca patient with UTI/septic shock,  DORY AMS, AF w RVR then SVR after 1 mg dig .    Shock resolved  Afib resolved now SR  AMS not far from baseline per family.      Check EEG  taper steroids off  Complete 5 days of ABX  Corynebacterium with colony count < 100K   S pend    Dig level steady state at day 3.  No role for Digibind          Minutes of Critical Care time: 32  (Reviewing data, imaging, discussing with multidisciplinary team, non inclusive of procedures, discussing goals of care with patient/family)

## 2018-01-09 NOTE — PROGRESS NOTE ADULT - PROBLEM SELECTOR PLAN 5
poor PO intake and recurrent hospitalizations, loss of ambulation  functional decline over the past several months per daughter

## 2018-01-09 NOTE — DIETITIAN INITIAL EVALUATION ADULT. - ORAL INTAKE PTA
Per daughter, pt has always been a poor eater. However in the past ~3 months pt has been in a rehab facility and her po intake had been "better then ever before," and the pt has been steadily gaining weight since that time.

## 2018-01-09 NOTE — PROGRESS NOTE ADULT - SUBJECTIVE AND OBJECTIVE BOX
Patient is a 84y old  Female who presents with a chief complaint of unresponsive and apnea (2018 23:26)      BRIEF HOSPITAL COURSE: 74 year old female with PMHx of afib , mvp and breast cancer was found to be lethargic by family EMS called who apparently palpated thready pulses and started cpr for few seconds per family when pt woke up. She was transferred by ambulance and came into ER with altered mental status and hypothermic found to be hypotensive . Found to be septic from UTI with shock and AMS, admitted to ICU on levophed    Events last 24 hours: off levophed, remains lethargic/altered, periods of bradycardia interspersed with AF w RVR    PAST MEDICAL & SURGICAL HISTORY:  Afib  Breast cancer  Mitral valve prolapse  No significant past surgical history      Review of Systems:  Cannot be obtained pt not verbalizing       Medications:  cefepime  IVPB 2000 milliGRAM(s) IV Intermittent every 24 hours  cefepime  IVPB      vancomycin  IVPB 1000 milliGRAM(s) IV Intermittent every 24 hours        donepezil 10 milliGRAM(s) Oral at bedtime  levETIRAcetam  IVPB 250 milliGRAM(s) IV Intermittent every 12 hours  LORazepam     Tablet 0.5 milliGRAM(s) Oral two times a day PRN  memantine 10 milliGRAM(s) Oral two times a day      heparin  Injectable 5000 Unit(s) SubCutaneous every 12 hours    pantoprazole   Suspension 40 milliGRAM(s) Oral daily      hydrocortisone sodium succinate Injectable 50 milliGRAM(s) IV Push every 12 hours  levothyroxine Injectable 50 MICROGram(s) IV Push <User Schedule>    multiple electrolytes Injection Type 1 1000 milliLiter(s) IV Continuous <Continuous>                ICU Vital Signs Last 24 Hrs  T(C): 36.6 (2018 16:00), Max: 37.1 (2018 20:00)  T(F): 97.9 (2018 16:00), Max: 98.8 (2018 20:00)  HR: 80 (2018 16:00) (53 - 110)  BP: 141/60 (2018 16:00) (89/54 - 161/91)  BP(mean): 87 (2018 16:00) (61 - 115)  ABP: --  ABP(mean): --  RR: 18 (2018 16:00) (12 - 30)  SpO2: 97% (2018 16:00) (89% - 100%)          I&O's Detail    2018 07:01  -  2018 07:00  --------------------------------------------------------  IN:    multiple electrolytes Injection Type 1: 2800 mL    norepinephrine Infusion: 62.3 mL    Solution: 150 mL    Solution: 350 mL    Solution: 600 mL    Solution: 100 mL  Total IN: 4062.3 mL    OUT:    Indwelling Catheter - Urethral: 391 mL  Total OUT: 391 mL    Total NET: 3671.3 mL      2018 07:01  -  2018 17:08  --------------------------------------------------------  IN:    multiple electrolytes Injection Type 1: 1000 mL  Total IN: 1000 mL    OUT:    Indwelling Catheter - Urethral: 145 mL  Total OUT: 145 mL    Total NET: 855 mL            LABS:                        9.7    7.4   )-----------( 181      ( 2018 05:30 )             30.7     01-09    143  |  107  |  24.0<H>  ----------------------------<  80  4.3   |  21.0<L>  |  1.54<H>    Ca    8.2<L>      2018 05:30  Phos  2.6     01-08  Mg     1.8     01-08            CAPILLARY BLOOD GLUCOSE        PT/INR - ( 2018 05:30 )   PT: 14.9 sec;   INR: 1.35 ratio         PTT - ( 2018 05:30 )  PTT:31.3 sec  Urinalysis Basic - ( 2018 20:53 )    Color: Yellow / Appearance: Slightly Turbid / S.025 / pH: x  Gluc: x / Ketone: Trace  / Bili: Small / Urobili: 1 mg/dL   Blood: x / Protein: 100 mg/dL / Nitrite: Negative   Leuk Esterase: Trace / RBC: 11-25 /HPF / WBC 3-5   Sq Epi: x / Non Sq Epi: Moderate / Bacteria: Many      CULTURES:  Culture Results:   50,000 - 99,000 CFU/mL Corynebacterium species ( @ 03:15)      Physical Examination:    General: No acute distress.      HEENT: Pupils equal, reactive to light.  Symmetric.    PULM: Clear to auscultation bilaterally, no significant sputum production    CVS: irregular rapid with periods of bradycardia, + systolic murmur    ABD: Soft, nondistended, nontender, normoactive bowel sounds, no masses    EXT: trace edema, nontender    SKIN: Warm and well perfused, no rashes noted.    NEURO: lethargic, Ahmet     RADIOLOGY:   < from: Xray Chest 1 View AP- PORTABLE-Urgent (18 @ 01:39) >  FINDINGS:    Single frontal view of the chest demonstrates the lungs to be clear. The   cardiomediastinal silhouette is normal. No acute osseous abnormalities.   Overlying EKG leads and wires are noted. Right central jugular central   venous catheter tip in the distal SVC.    IMPRESSION: No acute cardiopulmonary disease process.    < end of copied text >    CRITICAL CARE TIME SPENT: 60

## 2018-01-09 NOTE — CONSULT NOTE ADULT - SUBJECTIVE AND OBJECTIVE BOX
[facility]                                        [facility]                                  Colt Segura, & Charan                                      [address]                                           [place], [place], [address]                                                [phone]    HISTORY:  (Real name: Etta Salgado)    The patient is a 74y Female with dementia who has reportedly been declining in overall function for the past several months. She had a reported seizure in Oct of 2017 and was hospitalized and ultimately sent to rehab. She had another episode on the day she was supposed to be discharged and was sent to [facility]. She was started on Keppra and ultimately sent back to a different rehab center.   She has been wheelchair bound since October 2017 although was able to ambulate a few steps with assistance.    She was found by family to be lethargic with thready pulse and CPR was initiated and EMS called.     She has a reported history of seizure and is on Keppra at a low dose.    She is unable to give any history.     PAST MEDICAL & SURGICAL HISTORY:  Afib  Breast cancer  Mitral valve prolapse  No significant past surgical history        MEDICATIONS  (STANDING):  ascorbic acid IVPB 1500 milliGRAM(s) IV Intermittent every 6 hours  cefepime  IVPB 2000 milliGRAM(s) IV Intermittent every 24 hours  cefepime  IVPB      heparin  Injectable 5000 Unit(s) SubCutaneous every 12 hours  hydrocortisone sodium succinate Injectable 50 milliGRAM(s) IV Push every 6 hours  levETIRAcetam  IVPB 250 milliGRAM(s) IV Intermittent every 24 hours  levothyroxine Injectable 50 MICROGram(s) IV Push <User Schedule>  multiple electrolytes Injection Type 1 1000 milliLiter(s) (100 mL/Hr) IV Continuous <Continuous>  norepinephrine Infusion 0.3 MICROgram(s)/kG/Min (30.488 mL/Hr) IV Continuous <Continuous>  pantoprazole  Injectable 40 milliGRAM(s) IV Push daily  thiamine IVPB 100 milliGRAM(s) IV Intermittent two times a day  vancomycin  IVPB 1000 milliGRAM(s) IV Intermittent every 24 hours      Allergies  No Known Drug Allergies  shellfish (Hives)      SOCIAL HISTORY:  Lives with family.  Otherwise unknown.     FAMILY HISTORY:  No pertinent family history in first degree relatives    ROS:  Unobtainable due to patient's condition.     Exam:  Vital Signs Last 24 Hrs  T(F): 98.8 (09 Jan 2018 08:01), Max: 99.1 (08 Jan 2018 11:31)  HR: 107 (09 Jan 2018 09:00) (53 - 141)  BP: 97/65 (09 Jan 2018 09:00) (71/38 - 165/70)  BP(mean): 71 (09 Jan 2018 09:00) (46 - 115)  RR: 27 (09 Jan 2018 09:00) (12 - 30)  SpO2: 100% (09 Jan 2018 06:00) (69% - 100%)  General: NAD.   Carotid bruits absent.     Mental status: The patient is awake but non verbal. She does not follow instructions.     Cranial nerves: There is no papilledema. Pupils react Symmetrically to light. She blinks to threat. She tracks intermittently with gaze. Face is grossly symmetric.     Motor/Sensory: There is normal bulk and tone.  She moves all extremities to stimuli. At rest she curls into fetal position.     Reflexes: 2+ throughout and plantar responses are extensor.    Cerebellar: Cannot be tested.   LABS:                         9.7    7.4   )-----------( 181                30.7       143  |  107  |  24.0<H>  ----------------------------<  80  4.3   |  21.0<L>  |  1.54<H>    Ca    8.2<L>   Phos  2.6       Mg     1.8         TPro  7.7  /  Alb  3.1<L>  /  TBili  0.2<L>  /  DBili  x   /  AST  33<H>  /  ALT  24  /  AlkPhos  126<H>  01-07    PT/INR - ( 09 Jan 2018 05:30 )   PT: 14.9 sec;   INR: 1.35 ratio    PTT - ( 09 Jan 2018 05:30 )  PTT:31.3 sec    RADIOLOGY & ADDITIONAL STUDIES:  CT head reviewed: There is no acute pathology.

## 2018-01-09 NOTE — DIETITIAN INITIAL EVALUATION ADULT. - PROBLEM SELECTOR PLAN 3
Thiamine 200mg in D5 100ml D5 IV over 1 hour x4days  Vitamin C / ascorbic acid 1500mg in 100ml ns IV Q 6 hours x4days  hydrocortosone (solucortef) 50mg IVP Q6 x4days  30 cc/kg fluid challenge  Broad sectrum antibiotics  repeat lactate

## 2018-01-09 NOTE — PROGRESS NOTE ADULT - PROBLEM SELECTOR PLAN 1
changed zosyn to cefepime continue vanco until cultures come back  huffman may be removed  follow cultures

## 2018-01-09 NOTE — CONSULT NOTE ADULT - ASSESSMENT
Assessment and Plan:   · Assessment		  74 year old female patient with UTI/septic shock requiring IV pressors to support MAP          Problem/Plan - 1:  ·  Problem: UTI (urinary tract infection).  Plan: changed zosyn to cefepime continue vanco until cultures come back  huffman   goal UOP >0.5 cc/kg/hr  follow cultures.      Problem/Plan - 2:  ·  Problem: Septic shock.  Plan: volume challenge  continue on Levophed, will titrate for MAP 65-70  trend lactate  -blood/urine/sputum cultures, then initiate broad spectrum antibiotics  -follow cultures and narrow antibitoics as able  -goal UOP >0.5 cc/kg/hr  -procalcitonin.      Problem/Plan - 3:  ·  Problem: Atrial fibrillation with RVR.  Rate controlled now and in sinus will F/U ECHO today ? Coumadin therapy

## 2018-01-09 NOTE — PROGRESS NOTE ADULT - PROBLEM SELECTOR PLAN 6
met with daughter at bedside with patient's son on speaker phone who is a physician in another state- life review given by children, recognizing her decline, understanding her frailty they have begun discussion amongst themselves regarding GOC, no decision made at present. Overall condition of pt and medical plan of care explained in detail.

## 2018-01-10 LAB
ANION GAP SERPL CALC-SCNC: 11 MMOL/L — SIGNIFICANT CHANGE UP (ref 5–17)
BUN SERPL-MCNC: 23 MG/DL — HIGH (ref 8–20)
CALCIUM SERPL-MCNC: 8.7 MG/DL — SIGNIFICANT CHANGE UP (ref 8.6–10.2)
CHLORIDE SERPL-SCNC: 109 MMOL/L — HIGH (ref 98–107)
CO2 SERPL-SCNC: 24 MMOL/L — SIGNIFICANT CHANGE UP (ref 22–29)
CREAT SERPL-MCNC: 1.38 MG/DL — HIGH (ref 0.5–1.3)
GLUCOSE SERPL-MCNC: 86 MG/DL — SIGNIFICANT CHANGE UP (ref 70–115)
HCT VFR BLD CALC: 34.3 % — LOW (ref 37–47)
HGB BLD-MCNC: 10.6 G/DL — LOW (ref 12–16)
MAGNESIUM SERPL-MCNC: 2.2 MG/DL — SIGNIFICANT CHANGE UP (ref 1.6–2.6)
MCHC RBC-ENTMCNC: 29.9 PG — SIGNIFICANT CHANGE UP (ref 27–31)
MCHC RBC-ENTMCNC: 30.9 G/DL — LOW (ref 32–36)
MCV RBC AUTO: 96.6 FL — SIGNIFICANT CHANGE UP (ref 81–99)
PHOSPHATE SERPL-MCNC: 3.1 MG/DL — SIGNIFICANT CHANGE UP (ref 2.4–4.7)
PLATELET # BLD AUTO: 169 K/UL — SIGNIFICANT CHANGE UP (ref 150–400)
POTASSIUM SERPL-MCNC: 3.8 MMOL/L — SIGNIFICANT CHANGE UP (ref 3.5–5.3)
POTASSIUM SERPL-SCNC: 3.8 MMOL/L — SIGNIFICANT CHANGE UP (ref 3.5–5.3)
RBC # BLD: 3.55 M/UL — LOW (ref 4.4–5.2)
RBC # FLD: 15.7 % — HIGH (ref 11–15.6)
SODIUM SERPL-SCNC: 144 MMOL/L — SIGNIFICANT CHANGE UP (ref 135–145)
WBC # BLD: 10.5 K/UL — SIGNIFICANT CHANGE UP (ref 4.8–10.8)
WBC # FLD AUTO: 10.5 K/UL — SIGNIFICANT CHANGE UP (ref 4.8–10.8)

## 2018-01-10 PROCEDURE — 99233 SBSQ HOSP IP/OBS HIGH 50: CPT

## 2018-01-10 PROCEDURE — 95819 EEG AWAKE AND ASLEEP: CPT | Mod: 26

## 2018-01-10 PROCEDURE — 99232 SBSQ HOSP IP/OBS MODERATE 35: CPT

## 2018-01-10 PROCEDURE — 99231 SBSQ HOSP IP/OBS SF/LOW 25: CPT

## 2018-01-10 RX ORDER — ASPIRIN/CALCIUM CARB/MAGNESIUM 325 MG
325 TABLET ORAL DAILY
Qty: 0 | Refills: 0 | Status: DISCONTINUED | OUTPATIENT
Start: 2018-01-10 | End: 2018-01-10

## 2018-01-10 RX ORDER — ASPIRIN/CALCIUM CARB/MAGNESIUM 324 MG
325 TABLET ORAL DAILY
Qty: 0 | Refills: 0 | Status: DISCONTINUED | OUTPATIENT
Start: 2018-01-10 | End: 2018-01-16

## 2018-01-10 RX ORDER — HYDRALAZINE HCL 50 MG
10 TABLET ORAL
Qty: 0 | Refills: 0 | Status: DISCONTINUED | OUTPATIENT
Start: 2018-01-10 | End: 2018-01-22

## 2018-01-10 RX ORDER — AMLODIPINE BESYLATE 2.5 MG/1
5 TABLET ORAL DAILY
Qty: 0 | Refills: 0 | Status: DISCONTINUED | OUTPATIENT
Start: 2018-01-10 | End: 2018-01-18

## 2018-01-10 RX ADMIN — Medication 50 MILLIGRAM(S): at 05:16

## 2018-01-10 RX ADMIN — Medication 50 MICROGRAM(S): at 09:31

## 2018-01-10 RX ADMIN — HEPARIN SODIUM 5000 UNIT(S): 5000 INJECTION INTRAVENOUS; SUBCUTANEOUS at 17:01

## 2018-01-10 RX ADMIN — HEPARIN SODIUM 5000 UNIT(S): 5000 INJECTION INTRAVENOUS; SUBCUTANEOUS at 05:18

## 2018-01-10 RX ADMIN — Medication 10 MILLIGRAM(S): at 22:15

## 2018-01-10 RX ADMIN — Medication 250 MILLIGRAM(S): at 05:14

## 2018-01-10 RX ADMIN — CEFEPIME 100 MILLIGRAM(S): 1 INJECTION, POWDER, FOR SOLUTION INTRAMUSCULAR; INTRAVENOUS at 11:56

## 2018-01-10 RX ADMIN — LEVETIRACETAM 400 MILLIGRAM(S): 250 TABLET, FILM COATED ORAL at 10:47

## 2018-01-10 RX ADMIN — LEVETIRACETAM 400 MILLIGRAM(S): 250 TABLET, FILM COATED ORAL at 21:30

## 2018-01-10 NOTE — PROGRESS NOTE ADULT - SUBJECTIVE AND OBJECTIVE BOX
Patient is a 84y old  Female who presents with a chief complaint of unresponsive and apnea (2018 23:26)      BRIEF HOSPITAL COURSE: ***    Events last 24 hours: ***    PAST MEDICAL & SURGICAL HISTORY:  Afib  Breast cancer  Mitral valve prolapse  HTN (hypertension)  Dementia  Mitral Valve Prolapse  Depression  CVA (Cerebral Vascular Accident) (ICD9 434.91)  CAD (Coronary Artery Disease) (ICD9 414.00): with STENTS in  [facility] last CATH  in 10/2009 with 90% MID LAD on Medical managemnt  Dementia (ICD9 294.8)  Anxiety (ICD9 300.00)  GERD (Gastroesophageal Reflux Disease) (ICD9 530.81)  Atrial Fibrillation (ICD9 427.31)  Afib (ICD9 427.31)  Hypothyroidism (ICD9 244.9)  Hx of Breast Cancer (ICD9 V10.3)  Hypercholesterolemia (ICD9 272.0)  HTN  Transient Ischemic Attack (TIA) (ICD9 435.9)  No significant past surgical history  No significant past surgical history  S/P Breast Lumpectomy (ICD9 V45.89)   Section (ICD9 669.70)  After Cataract, Bilateral (ICD9 366.50)      Review of Systems:  CONSTITUTIONAL: No fever, chills, or fatigue  EYES: No eye pain, visual disturbances, or discharge  ENMT:  No difficulty hearing, tinnitus, vertigo; No sinus or throat pain  NECK: No pain or stiffness  RESPIRATORY: No cough, wheezing, chills or hemoptysis; No shortness of breath  CARDIOVASCULAR: No chest pain, palpitations, dizziness, or leg swelling  GASTROINTESTINAL: No abdominal or epigastric pain. No nausea, vomiting, or hematemesis; No diarrhea or constipation. No melena or hematochezia.  GENITOURINARY: No dysuria, frequency, hematuria, or incontinence  NEUROLOGICAL: No headaches, memory loss, loss of strength, numbness, or tremors  SKIN: No itching, burning, rashes, or lesions   MUSCULOSKELETAL: No joint pain or swelling; No muscle, back, or extremity pain  PSYCHIATRIC: No depression, anxiety, mood swings, or difficulty sleeping      Medications:  cefepime  IVPB 2000 milliGRAM(s) IV Intermittent every 24 hours  cefepime  IVPB        amLODIPine   Tablet 5 milliGRAM(s) Oral daily      aspirin 325 milliGRAM(s) Oral daily  levETIRAcetam  IVPB 250 milliGRAM(s) IV Intermittent every 12 hours  LORazepam     Tablet 0.5 milliGRAM(s) Oral two times a day PRN      heparin  Injectable 5000 Unit(s) SubCutaneous every 12 hours    pantoprazole   Suspension 40 milliGRAM(s) Oral daily      levothyroxine Injectable 50 MICROGram(s) IV Push <User Schedule>                  ICU Vital Signs Last 24 Hrs  T(C): 35.3 (10 Paulino 2018 14:00), Max: 37 (10 Paulino 2018 04:00)  T(F): 95.6 (10 Paulino 2018 14:00), Max: 98.6 (10 Paulino 2018 04:00)  HR: 59 (10 Paulino 2018 19:00) (56 - 82)  BP: 160/73 (10 Paulino 2018 19:00) (97/69 - 186/71)  BP(mean): 105 (10 Paulino 2018 19:00) (79 - 139)  ABP: --  ABP(mean): --  RR: 12 (10 Paulnio 2018 19:00) (10 - 23)  SpO2: 100% (10 Paulino 2018 19:00) (92% - 100%)          I&O's Detail    2018 07:01  -  10 Paulino 2018 07:00  --------------------------------------------------------  IN:    multiple electrolytes Injection Type 1multiple electrolytes Injection Type 1: 1000 mL    Solution: 200 mL    Solution: 250 mL  Total IN: 1450 mL    OUT:    Indwelling Catheter - Urethral: 145 mL  Total OUT: 145 mL    Total NET: 1305 mL      10 Paulino 2018 07:01  -  10 Paulino 2018 19:48  --------------------------------------------------------  IN:    Solution: 100 mL    Solution: 50 mL  Total IN: 150 mL    OUT:  Total OUT: 0 mL    Total NET: 150 mL            LABS:                        10.6   10.5  )-----------( 169      ( 10 Paulino 2018 04:54 )             34.3     01-10    144  |  109<H>  |  23.0<H>  ----------------------------<  86  3.8   |  24.0  |  1.38<H>    Ca    8.7      10 Paulino 2018 04:54  Phos  3.1     01-10  Mg     2.2     -10            CAPILLARY BLOOD GLUCOSE        PT/INR - ( 2018 05:30 )   PT: 14.9 sec;   INR: 1.35 ratio         PTT - ( 2018 05:30 )  PTT:31.3 sec    CULTURES:  Culture Results:   50,000 - 99,000 CFU/mL Corynebacterium species ( @ 03:15)  Culture Results:   No growth at 48 hours ( @ 19:44)  Culture Results:   No growth at 48 hours ( @ 19:44)      Physical Examination:    General: No acute distress.      HEENT: Pupils equal, reactive to light.  Symmetric.    PULM: Clear to auscultation bilaterally, no significant sputum production    CVS: Regular rate and rhythm, no murmurs, rubs, or gallops    ABD: Soft, nondistended, nontender, normoactive bowel sounds, no masses    EXT: No edema, nontender    SKIN: Warm and well perfused, no rashes noted.    NEURO: Alert, oriented, interactive, nonfocal    RADIOLOGY: ***    CRITICAL CARE TIME SPENT: *** Patient is a 84y old  Female who presents with a chief complaint of unresponsive and apnea (2018 23:26)      BRIEF HOSPITAL COURSE: 74 year old female with PMHx of afib , mvp and breast cancer was found to be lethargic by family EMS called who apparently palpated thready pulses and started cpr for few seconds per family when pt woke up. She was transferred by ambulance and came into ER with altered mental status and hypothermic found to be hypotensive . Found to be septic from UTI with shock and AMS, admitted to ICU on levophed. Pt had AF w RVR and received digoxin loading but developed bradycardia    Events last 24 hours: more awake, refusing to take po    PAST MEDICAL & SURGICAL HISTORY:  Afib  Breast cancer  Mitral valve prolapse  HTN (hypertension)  Dementia  Mitral Valve Prolapse  Depression  CVA (Cerebral Vascular Accident) (ICD9 434.91)  CAD (Coronary Artery Disease) (ICD9 414.00): with STENTS in  [facility] last CATH  in 10/2009 with 90% MID LAD on Medical managemnt  Dementia (ICD9 294.8)  Anxiety (ICD9 300.00)  GERD (Gastroesophageal Reflux Disease) (ICD9 530.81)  Atrial Fibrillation (ICD9 427.31)  Afib (ICD9 427.31)  Hypothyroidism (ICD9 244.9)  Hx of Breast Cancer (ICD9 V10.3)  Hypercholesterolemia (ICD9 272.0)  HTN  Transient Ischemic Attack (TIA) (ICD9 435.9)  No significant past surgical history  No significant past surgical history  S/P Breast Lumpectomy (ICD9 V45.89)   Section (ICD9 669.70)  After Cataract, Bilateral (ICD9 366.50)      Review of Systems:  Cannot obtain pt not verbalizing    Medications:  cefepime  IVPB 2000 milliGRAM(s) IV Intermittent every 24 hours  cefepime  IVPB        amLODIPine   Tablet 5 milliGRAM(s) Oral daily      aspirin 325 milliGRAM(s) Oral daily  levETIRAcetam  IVPB 250 milliGRAM(s) IV Intermittent every 12 hours  LORazepam     Tablet 0.5 milliGRAM(s) Oral two times a day PRN      heparin  Injectable 5000 Unit(s) SubCutaneous every 12 hours    pantoprazole   Suspension 40 milliGRAM(s) Oral daily      levothyroxine Injectable 50 MICROGram(s) IV Push <User Schedule>                  ICU Vital Signs Last 24 Hrs  T(C): 35.3 (10 Paulino 2018 14:00), Max: 37 (10 Paulino 2018 04:00)  T(F): 95.6 (10 Paulino 2018 14:00), Max: 98.6 (10 Paulino 2018 04:00)  HR: 59 (10 Paulino 2018 19:00) (56 - 82)  BP: 160/73 (10 Paulino 2018 19:00) (97/69 - 186/71)  BP(mean): 105 (10 Paulino 2018 19:00) (79 - 139)  ABP: --  ABP(mean): --  RR: 12 (10 Paulino 2018 19:00) (10 - 23)  SpO2: 100% (10 Paulino 2018 19:00) (92% - 100%)          I&O's Detail    2018 07:01  -  10 Paulino 2018 07:00  --------------------------------------------------------  IN:    multiple electrolytes Injection Type 1multiple electrolytes Injection Type 1: 1000 mL    Solution: 200 mL    Solution: 250 mL  Total IN: 1450 mL    OUT:    Indwelling Catheter - Urethral: 145 mL  Total OUT: 145 mL    Total NET: 1305 mL      10 Paulino 2018 07:01  -  10 Paulino 2018 19:48  --------------------------------------------------------  IN:    Solution: 100 mL    Solution: 50 mL  Total IN: 150 mL    OUT:  Total OUT: 0 mL    Total NET: 150 mL            LABS:                        10.6   10.5  )-----------( 169      ( 10 Paulino 2018 04:54 )             34.3     01-10    144  |  109<H>  |  23.0<H>  ----------------------------<  86  3.8   |  24.0  |  1.38<H>    Ca    8.7      10 Paulino 2018 04:54  Phos  3.1     01-10  Mg     2.2     01-10            CAPILLARY BLOOD GLUCOSE        PT/INR - ( 2018 05:30 )   PT: 14.9 sec;   INR: 1.35 ratio         PTT - ( 2018 05:30 )  PTT:31.3 sec    CULTURES:  Culture Results:   50,000 - 99,000 CFU/mL Corynebacterium species ( @ 03:15)  Culture Results:   No growth at 48 hours ( @ 19:44)  Culture Results:   No growth at 48 hours ( @ 19:44)      Physical Examination:    General: No acute distress.      HEENT: Pupils equal, reactive to light.  Symmetric.    PULM: Clear to auscultation bilaterally, no significant sputum production    CVS: Regular rate and rhythm, periods of bradycardia    ABD: Soft, nondistended, nontender, normoactive bowel sounds, no masses    EXT: No edema, nontender    SKIN: Warm and well perfused, no rashes noted.    NEURO: Alert, oriented, interactive, nonfocal    RADIOLOGY:     CRITICAL CARE TIME SPENT:

## 2018-01-10 NOTE — PROGRESS NOTE ADULT - SUBJECTIVE AND OBJECTIVE BOX
[facility]                                     [facility] [place]                                 Colt Segura & Charan                                  [address]                                        [place], [place], [address]                                             [phone]      Vital signs:  T(C): 36.4 (01-10-18 @ 08:01), Max: 37 (01-10-18 @ 04:00)  HR: 57 (01-10-18 @ 12:00) (56 - 93)  BP: 168/71 (01-10-18 @ 12:00) (97/69 - 169/73)  RR: 14 (01-10-18 @ 12:00) (10 - 26)  SpO2: 99% (01-10-18 @ 12:00) (92% - 100%)  Wt(kg): --    Exam:    No new complaints.  no further seizures  Awake and alert.  not speaking much  Pupils react.  Face symmetric smile and sensation  hearing symmetric  tongue ML  no focal weakness in 4 ext  intact FT x 4 ext    EEG no seizure activity but significant muscle artifact

## 2018-01-10 NOTE — PROGRESS NOTE ADULT - SUBJECTIVE AND OBJECTIVE BOX
CHIEF COMPLAINT:Patient is a 84y old  Female who presents with a chief complaint of unresponsive and apnea.   Pt is treated for UTI.  Bradycardic, with elevated Dig level, hemodynamically stable.   She is not AC with Afib.     Allergies  latex (Unknown)  shellfish (Hives)  shellfish (Other)  shellfish (Rash)    MEDICATIONS:  cefepime  IVPB 2000 milliGRAM(s) IV Intermittent every 24 hours  cefepime  IVPB      vancomycin  IVPB 1000 milliGRAM(s) IV Intermittent every 24 hours  donepezil 10 milliGRAM(s) Oral at bedtime  levETIRAcetam  IVPB 250 milliGRAM(s) IV Intermittent every 12 hours  LORazepam     Tablet 0.5 milliGRAM(s) Oral two times a day PRN  memantine 10 milliGRAM(s) Oral two times a day  pantoprazole   Suspension 40 milliGRAM(s) Oral daily  levothyroxine Injectable 50 MICROGram(s) IV Push <User Schedule>  heparin  Injectable 5000 Unit(s) SubCutaneous every 12 hours    PHYSICAL EXAM:  T(C): 36.4 (01-10-18 @ 08:01), Max: 37 (01-10-18 @ 04:00)  HR: 57 (01-10-18 @ 09:00) (57 - 99)  BP: 160/71 (01-10-18 @ 09:00) (97/69 - 169/73)  RR: 14 (01-10-18 @ 09:00) (11 - 26)  SpO2: 99% (01-10-18 @ 09:00) (92% - 100%)  Wt(kg): --    I&O's Summary    09 Jan 2018 07:01  -  10 Paulino 2018 07:00  --------------------------------------------------------  IN: 1450 mL / OUT: 145 mL / NET: 1305 mL	  HEENT:   NC/AT  Eye: Pink Conjunctiva  Lungs: CTA B/L  CVS: RRR, Normal S1 and S2, No Edema, venodynes in place.   Pulses: Normal distal pulses.    TELEMETRY: NSR, leigh. 	      LABS:	 	                      10.6   10.5  )-----------( 169      ( 10 Paulino 2018 04:54 )             34.3     01-10    144  |  109<H>  |  23.0<H>  ----------------------------<  86  3.8   |  24.0  |  1.38<H>    Ca    8.7      10 Paulino 2018 04:54  Phos  3.1     01-10  Mg     2.2     01-10    ASSESSMENT/PLAN:

## 2018-01-10 NOTE — PROGRESS NOTE ADULT - ASSESSMENT
The patient is a 84y Female with likely seizure, eeg unremarkable    continue keppra    will follow with you    Jose G Gregory MD PhD   [id number]

## 2018-01-10 NOTE — PROGRESS NOTE ADULT - ASSESSMENT
1. Afib, in NSR.   2. Full dose heparin drip with aifb and high CHADS-vasc.  3. Add amlodipine for BP control  4. Hold aricept and namenda due to bradycardia  5. Elevated dig level, no need for digibind at this time

## 2018-01-10 NOTE — PROGRESS NOTE ADULT - SUBJECTIVE AND OBJECTIVE BOX
Patient is a 84y old  Female who presents with a chief complaint of unresponsive and apnea (2018 23:26)    PAST MEDICAL & SURGICAL HISTORY:  Afib  Breast cancer  Mitral valve prolapse  HTN (hypertension)  Dementia  Mitral Valve Prolapse  Depression  CVA (Cerebral Vascular Accident) (ICD9 434.91)  CAD (Coronary Artery Disease) (ICD9 414.00): with STENTS in  [facility] last CATH  in 10/2009 with 90% MID LAD on Medical managemnt  Dementia (ICD9 294.8)  Anxiety (ICD9 300.00)  GERD (Gastroesophageal Reflux Disease) (ICD9 530.81)  Atrial Fibrillation (ICD9 427.31)  Afib (ICD9 427.31)  Hypothyroidism (ICD9 244.9)  Hx of Breast Cancer (ICD9 V10.3)  Hypercholesterolemia (ICD9 272.0)  HTN  Transient Ischemic Attack (TIA) (ICD9 435.9)  No significant past surgical history  No significant past surgical history  S/P Breast Lumpectomy (ICD9 V45.89)   Section (ICD9 669.70)  After Cataract, Bilateral (ICD9 366.50)    HIPOLITO SALGADO   84y    Female    BRIEF HOSPITAL COURSE:    74 year old female with PMHx of afib , mvp and breast cancer was found to be lethargic by family EMS called who apparently palpated thready pulses and started cpr for few seconds per family when pt woke up. She was transferred by ambulance and came into ER with altered mental status and hypothermic found to be hypotensive . Found to be septic from UTI with shock and AMS, admitted to ICU on levophed    Events last 24 hours: off levophed.  More awake no SZ.  Not tolerating PO.      Review of Systems:    UATO demented    All other ROS are negative.    ICU Vital Signs Last 24 Hrs  T(C): 35.3 (10 Pauilno 2018 14:00), Max: 37 (10 Paulino 2018 04:00)  T(F): 95.6 (10 Paulino 2018 14:00), Max: 98.6 (10 Paulino 2018 04:00)  HR: 73 (10 Paulino 2018 22:00) (56 - 82)  BP: 174/110 (10 Paulino 2018 22:00) (97/69 - 186/71)  BP(mean): 135 (10 Paulino 2018 22:00) (79 - 139)  ABP: --  ABP(mean): --  RR: 15 (10 Paulino 2018 22:00) (10 - 20)  SpO2: 100% (10 Paulino 2018 22:00) (95% - 100%)    Physical Examination:    General:  NAD    HEENT: RIJ TLC     PULM:  bilateral BS    CVS:  s1 s2 reg 2/6 JASPAL LSB    ABD: soft     EXT:  no edema     SKIN:  warm    Neuro: moves 4 ext to pain awake alet non verbal         LABS:                        10.6   10.5  )-----------( 169      ( 10 Paulino 2018 04:54 )             34.3     01-10    144  |  109<H>  |  23.0<H>  ----------------------------<  86  3.8   |  24.0  |  1.38<H>    Ca    8.7      10 Paulino 2018 04:54  Phos  3.1     01-10  Mg     2.2     01-10      CAPILLARY BLOOD GLUCOSE    PT/INR - ( 2018 05:30 )   PT: 14.9 sec;   INR: 1.35 ratio         PTT - ( 2018 05:30 )  PTT:31.3 sec    CULTURES:  Culture Results:   50,000 - 99,000 CFU/mL Corynebacterium species ( @ 03:15)  Culture Results:   No growth at 48 hours ( @ 19:44)  Culture Results:   No growth at 48 hours ( @ 19:44)      Medications:  cefepime  IVPB 2000 milliGRAM(s) IV Intermittent every 24 hours  cefepime  IVPB      amLODIPine   Tablet 5 milliGRAM(s) Oral daily  hydrALAZINE Injectable 10 milliGRAM(s) IV Push every 2 hours PRN  aspirin 325 milliGRAM(s) Oral daily  levETIRAcetam  IVPB 250 milliGRAM(s) IV Intermittent every 12 hours  LORazepam     Tablet 0.5 milliGRAM(s) Oral two times a day PRN  heparin  Injectable 5000 Unit(s) SubCutaneous every 12 hours  pantoprazole   Suspension 40 milliGRAM(s) Oral daily  levothyroxine Injectable 50 MICROGram(s) IV Push <User Schedule>         @ 07:01  -  01-10 @ 07:00  --------------------------------------------------------  IN: 1450 mL / OUT: 145 mL / NET: 1305 mL    01-10 @ 07: @ 00:22  --------------------------------------------------------  IN: 250 mL / OUT: 0 mL / NET: 250 mL      RADIOLOGY/IMAGING/ECHO    < from: EEG Awake and Asleep (01.10.18 @ 08:26) >  mpression: This is an abnormal record. There is global background   slowing suggestive of moderate to severe cerebral dysfunction, but there   is no seizure activity during the recording.        Assessment/Plan:    84 year old female  hx dementia  afib breast ca patient with UTI/septic shock,  DORY AMS, AF w RVR then SVR after 1 mg dig .    Shock resolved steroids off.    Afib resolved now SR      EEG no SZ steroids off  Complete 5 days of ABX  Corynebacterium with colony count < 100K       Dig level steady state at day 3.  No role for Digibind        RIJ TLC removed  Check thyroid fx tests    More alert, not swallowing appropriately (this is not new).  She has advanced dementia (does not speak, near total assist with ADL's).      Can transfer to monitored bed.    Palliative consult appropriate.  Dr Story has been working with the family.  They want Mrs Salgado back home.      Will d/w hospitalists. Patient is a 84y old  Female who presents with a chief complaint of unresponsive and apnea (2018 23:26)    PAST MEDICAL & SURGICAL HISTORY:  Afib  Breast cancer  Mitral valve prolapse  HTN (hypertension)  Dementia  Mitral Valve Prolapse  Depression  CVA (Cerebral Vascular Accident) (ICD9 434.91)  CAD (Coronary Artery Disease) (ICD9 414.00): with STENTS in  [facility] last CATH  in 10/2009 with 90% MID LAD on Medical managemnt  Dementia (ICD9 294.8)  Anxiety (ICD9 300.00)  GERD (Gastroesophageal Reflux Disease) (ICD9 530.81)  Atrial Fibrillation (ICD9 427.31)  Afib (ICD9 427.31)  Hypothyroidism (ICD9 244.9)  Hx of Breast Cancer (ICD9 V10.3)  Hypercholesterolemia (ICD9 272.0)  HTN  Transient Ischemic Attack (TIA) (ICD9 435.9)  No significant past surgical history  No significant past surgical history  S/P Breast Lumpectomy (ICD9 V45.89)   Section (ICD9 669.70)  After Cataract, Bilateral (ICD9 366.50)    HIPOLITO SALGADO   84y    Female    BRIEF HOSPITAL COURSE:    74 year old female with PMHx of afib , mvp and breast cancer was found to be lethargic by family EMS called who apparently palpated thready pulses and started cpr for few seconds per family when pt woke up. She was transferred by ambulance and came into ER with altered mental status and hypothermic found to be hypotensive . Found to be septic from UTI with shock and AMS, admitted to ICU on levophed    Events last 24 hours: off levophed.  More awake no SZ.  Not tolerating PO.      Review of Systems:    UATO demented    All other ROS are negative.    ICU Vital Signs Last 24 Hrs  T(C): 35.3 (10 Paulino 2018 14:00), Max: 37 (10 Paulino 2018 04:00)  T(F): 95.6 (10 Paulino 2018 14:00), Max: 98.6 (10 Paulino 2018 04:00)  HR: 73 (10 Paulino 2018 22:00) (56 - 82)  BP: 174/110 (10 Paulino 2018 22:00) (97/69 - 186/71)  BP(mean): 135 (10 Paulino 2018 22:00) (79 - 139)  ABP: --  ABP(mean): --  RR: 15 (10 Paulino 2018 22:00) (10 - 20)  SpO2: 100% (10 Paulino 2018 22:00) (95% - 100%)    Physical Examination:    General:  NAD    HEENT: RIJ TLC     PULM:  bilateral BS    CVS:  s1 s2 reg 2/6 JASPAL LSB    ABD: soft     EXT:  no edema     SKIN:  warm    Neuro: moves 4 ext to pain awake alet non verbal         LABS:                        10.6   10.5  )-----------( 169      ( 10 Paulino 2018 04:54 )             34.3     01-10    144  |  109<H>  |  23.0<H>  ----------------------------<  86  3.8   |  24.0  |  1.38<H>    Ca    8.7      10 Paulino 2018 04:54  Phos  3.1     01-10  Mg     2.2     01-10      CAPILLARY BLOOD GLUCOSE    PT/INR - ( 2018 05:30 )   PT: 14.9 sec;   INR: 1.35 ratio         PTT - ( 2018 05:30 )  PTT:31.3 sec    CULTURES:  Culture Results:   50,000 - 99,000 CFU/mL Corynebacterium species ( @ 03:15)  Culture Results:   No growth at 48 hours ( @ 19:44)  Culture Results:   No growth at 48 hours ( @ 19:44)      Medications:  cefepime  IVPB 2000 milliGRAM(s) IV Intermittent every 24 hours  cefepime  IVPB      amLODIPine   Tablet 5 milliGRAM(s) Oral daily  hydrALAZINE Injectable 10 milliGRAM(s) IV Push every 2 hours PRN  aspirin 325 milliGRAM(s) Oral daily  levETIRAcetam  IVPB 250 milliGRAM(s) IV Intermittent every 12 hours  LORazepam     Tablet 0.5 milliGRAM(s) Oral two times a day PRN  heparin  Injectable 5000 Unit(s) SubCutaneous every 12 hours  pantoprazole   Suspension 40 milliGRAM(s) Oral daily  levothyroxine Injectable 50 MICROGram(s) IV Push <User Schedule>         @ 07:01  -  01-10 @ 07:00  --------------------------------------------------------  IN: 1450 mL / OUT: 145 mL / NET: 1305 mL    01-10 @ 07: @ 00:22  --------------------------------------------------------  IN: 250 mL / OUT: 0 mL / NET: 250 mL      RADIOLOGY/IMAGING/ECHO    < from: EEG Awake and Asleep (01.10.18 @ 08:26) >  mpression: This is an abnormal record. There is global background   slowing suggestive of moderate to severe cerebral dysfunction, but there   is no seizure activity during the recording.        Assessment/Plan:    84 year old female  hx dementia  afib breast ca patient with UTI/septic shock,  DORY AMS, AF w RVR then SVR after 1 mg dig .      Shock resolved steroids off.    Afib resolved now SR   Not a candidate for AC despite high Chads/vasc.  Advanced dementia fall risk.       EEG no SZ on keppra IV  Complete 5 days of ABX  Corynebacterium with colony count < 100K    Marik protocol ending    Dig level steady state at day 3.  No role for Digibind        RIJ TLC removed  Check thyroid fx tests    More alert, not swallowing appropriately (this is not new).  She has advanced dementia (does not speak, near total assist with ADL's).      Can transfer to monitored bed.    Palliative consult appropriate.  Dr Story has been working with the family.  They want Mrs Salgado back home.      Will d/w hospitalists.

## 2018-01-10 NOTE — PROGRESS NOTE ADULT - PROBLEM SELECTOR PLAN 4
on donepezil and memantadine- will hold due to leigh per cardio    at home having periods of inattention which was diagnosed as seizure and being treated with Keppra.   neuro eval noted- continue keppra - EEG done no seizures

## 2018-01-10 NOTE — PROGRESS NOTE ADULT - PROBLEM SELECTOR PLAN 3
now in sinus with periods of bradycardia   Digoxin level high no further digoxin ordered  restarted synthroid   will hold off on any blocking agents for now  CHADs 2VASC score high cardio rec full AC, pt is fall risk seizure and dementia   will do full dose aspirin for now

## 2018-01-10 NOTE — PROGRESS NOTE ADULT - PROBLEM SELECTOR PLAN 6
daughters and  at bedside continued conversations regarding pt's functional decline and what they want to pursue for her care. Palliative f/u

## 2018-01-11 DIAGNOSIS — Z29.9 ENCOUNTER FOR PROPHYLACTIC MEASURES, UNSPECIFIED: ICD-10-CM

## 2018-01-11 DIAGNOSIS — N39.0 URINARY TRACT INFECTION, SITE NOT SPECIFIED: ICD-10-CM

## 2018-01-11 DIAGNOSIS — Z51.5 ENCOUNTER FOR PALLIATIVE CARE: ICD-10-CM

## 2018-01-11 DIAGNOSIS — R53.81 OTHER MALAISE: ICD-10-CM

## 2018-01-11 LAB
ANION GAP SERPL CALC-SCNC: 13 MMOL/L — SIGNIFICANT CHANGE UP (ref 5–17)
BUN SERPL-MCNC: 27 MG/DL — HIGH (ref 8–20)
CALCIUM SERPL-MCNC: 9.2 MG/DL — SIGNIFICANT CHANGE UP (ref 8.6–10.2)
CHLORIDE SERPL-SCNC: 108 MMOL/L — HIGH (ref 98–107)
CO2 SERPL-SCNC: 21 MMOL/L — LOW (ref 22–29)
CREAT SERPL-MCNC: 1.15 MG/DL — SIGNIFICANT CHANGE UP (ref 0.5–1.3)
DIGOXIN SERPL-MCNC: 2.2 NG/ML — HIGH (ref 0.8–2)
GLUCOSE SERPL-MCNC: 76 MG/DL — SIGNIFICANT CHANGE UP (ref 70–115)
HCT VFR BLD CALC: 36.5 % — LOW (ref 37–47)
HGB BLD-MCNC: 11.8 G/DL — LOW (ref 12–16)
MCHC RBC-ENTMCNC: 30.3 PG — SIGNIFICANT CHANGE UP (ref 27–31)
MCHC RBC-ENTMCNC: 32.3 G/DL — SIGNIFICANT CHANGE UP (ref 32–36)
MCV RBC AUTO: 93.6 FL — SIGNIFICANT CHANGE UP (ref 81–99)
PLATELET # BLD AUTO: 216 K/UL — SIGNIFICANT CHANGE UP (ref 150–400)
POTASSIUM SERPL-MCNC: 3.7 MMOL/L — SIGNIFICANT CHANGE UP (ref 3.5–5.3)
POTASSIUM SERPL-SCNC: 3.7 MMOL/L — SIGNIFICANT CHANGE UP (ref 3.5–5.3)
RBC # BLD: 3.9 M/UL — LOW (ref 4.4–5.2)
RBC # FLD: 15.2 % — SIGNIFICANT CHANGE UP (ref 11–15.6)
SODIUM SERPL-SCNC: 142 MMOL/L — SIGNIFICANT CHANGE UP (ref 135–145)
TSH SERPL-MCNC: 3.74 UIU/ML — SIGNIFICANT CHANGE UP (ref 0.27–4.2)
WBC # BLD: 9.3 K/UL — SIGNIFICANT CHANGE UP (ref 4.8–10.8)
WBC # FLD AUTO: 9.3 K/UL — SIGNIFICANT CHANGE UP (ref 4.8–10.8)

## 2018-01-11 PROCEDURE — 99231 SBSQ HOSP IP/OBS SF/LOW 25: CPT

## 2018-01-11 PROCEDURE — 99232 SBSQ HOSP IP/OBS MODERATE 35: CPT

## 2018-01-11 PROCEDURE — 99221 1ST HOSP IP/OBS SF/LOW 40: CPT

## 2018-01-11 PROCEDURE — 99233 SBSQ HOSP IP/OBS HIGH 50: CPT

## 2018-01-11 RX ORDER — LEVETIRACETAM 250 MG/1
250 TABLET, FILM COATED ORAL ONCE
Qty: 0 | Refills: 0 | Status: COMPLETED | OUTPATIENT
Start: 2018-01-11 | End: 2018-01-11

## 2018-01-11 RX ORDER — LEVETIRACETAM 250 MG/1
250 TABLET, FILM COATED ORAL
Qty: 0 | Refills: 0 | Status: DISCONTINUED | OUTPATIENT
Start: 2018-01-11 | End: 2018-01-22

## 2018-01-11 RX ORDER — LEVOTHYROXINE SODIUM 125 MCG
100 TABLET ORAL DAILY
Qty: 0 | Refills: 0 | Status: DISCONTINUED | OUTPATIENT
Start: 2018-01-12 | End: 2018-01-22

## 2018-01-11 RX ORDER — PANTOPRAZOLE SODIUM 20 MG/1
40 TABLET, DELAYED RELEASE ORAL
Qty: 0 | Refills: 0 | Status: DISCONTINUED | OUTPATIENT
Start: 2018-01-12 | End: 2018-01-22

## 2018-01-11 RX ORDER — POTASSIUM CHLORIDE 20 MEQ
10 PACKET (EA) ORAL
Qty: 0 | Refills: 0 | Status: COMPLETED | OUTPATIENT
Start: 2018-01-11 | End: 2018-01-11

## 2018-01-11 RX ORDER — CARVEDILOL PHOSPHATE 80 MG/1
3.12 CAPSULE, EXTENDED RELEASE ORAL EVERY 12 HOURS
Qty: 0 | Refills: 0 | Status: DISCONTINUED | OUTPATIENT
Start: 2018-01-11 | End: 2018-01-18

## 2018-01-11 RX ADMIN — CEFEPIME 100 MILLIGRAM(S): 1 INJECTION, POWDER, FOR SOLUTION INTRAMUSCULAR; INTRAVENOUS at 11:46

## 2018-01-11 RX ADMIN — LEVETIRACETAM 400 MILLIGRAM(S): 250 TABLET, FILM COATED ORAL at 10:44

## 2018-01-11 RX ADMIN — Medication 100 MILLIEQUIVALENT(S): at 10:44

## 2018-01-11 RX ADMIN — Medication 325 MILLIGRAM(S): at 07:54

## 2018-01-11 RX ADMIN — Medication 50 MICROGRAM(S): at 07:55

## 2018-01-11 RX ADMIN — Medication 10 MILLIGRAM(S): at 19:46

## 2018-01-11 RX ADMIN — CARVEDILOL PHOSPHATE 3.12 MILLIGRAM(S): 80 CAPSULE, EXTENDED RELEASE ORAL at 08:55

## 2018-01-11 RX ADMIN — HEPARIN SODIUM 5000 UNIT(S): 5000 INJECTION INTRAVENOUS; SUBCUTANEOUS at 05:46

## 2018-01-11 RX ADMIN — AMLODIPINE BESYLATE 5 MILLIGRAM(S): 2.5 TABLET ORAL at 07:54

## 2018-01-11 RX ADMIN — Medication 100 MILLIEQUIVALENT(S): at 12:59

## 2018-01-11 RX ADMIN — HEPARIN SODIUM 5000 UNIT(S): 5000 INJECTION INTRAVENOUS; SUBCUTANEOUS at 17:10

## 2018-01-11 RX ADMIN — Medication 100 MILLIEQUIVALENT(S): at 11:46

## 2018-01-11 RX ADMIN — PANTOPRAZOLE SODIUM 40 MILLIGRAM(S): 20 TABLET, DELAYED RELEASE ORAL at 07:54

## 2018-01-11 NOTE — PROVIDER CONTACT NOTE (MEDICATION) - BACKGROUND
Pt has history of severe dementia and is resistant to care and is unable to understand necessity of medications.

## 2018-01-11 NOTE — PROGRESS NOTE ADULT - ATTENDING COMMENTS
I have seen and evaluated this patient independently and agree with the above findings except as follows: Pt with advanced dementia and recurrent admissions over past several months now a/w sepsis due to UTI complicated by AF w RVR then bradycardia after digoxin loading. Mentally to her baseline but severely malnourished and debilitated. Family having discussions with me and among themselves regarding overall plan of care and goals moving forward. I have approached them regarding advanced directives but no decision has been made as yet. Palliative care involved on case. Pt is stable to transfer from ICU at this time.

## 2018-01-11 NOTE — PROGRESS NOTE ADULT - PROBLEM SELECTOR PLAN 4
on donepezil and Memantine- will hold due to leigh per cardio    at home having periods of inattention which was diagnosed as seizure and being treated with Keppra.   neuro eval noted- continue keppra - EEG done no seizures

## 2018-01-11 NOTE — PROGRESS NOTE ADULT - PROBLEM SELECTOR PLAN 3
now in sinus   restarted synthroid  Resume Coreg now for rate and BP control   CHADs 2VASC score high cardio rec full AC, pt is fall risk seizure and dementia   will do full dose aspirin for now

## 2018-01-11 NOTE — PROGRESS NOTE ADULT - SUBJECTIVE AND OBJECTIVE BOX
HIPOLITO AGUILAR  ----------------------------------------    CC:  ASKED TO evaluate for Transfer out of MICU to Hospital Medicine service     74 year old female with PMHx of afib , mvp and breast cancer was found to be lethargic by family EMS called who apparently palpated thready pulses and started cpr for few seconds per family when pt woke up. She was transferred by ambulance and came into ER with altered mental status and hypothermic found to be hypotensive . Found to be septic from UTI with shock and AMS, admitted to ICU on levophed. Pt had AF w RVR and received digoxin loading but developed bradycardia      Vital Signs Last 24 Hrs  T(C): 36.6 (11 Jan 2018 12:16), Max: 36.6 (11 Jan 2018 04:00)  T(F): 97.8 (11 Jan 2018 12:16), Max: 97.8 (11 Jan 2018 04:00)  HR: 77 (11 Jan 2018 14:00) (59 - 91)  BP: 154/66 (11 Jan 2018 14:00) (96/66 - 186/71)  BP(mean): 95 (11 Jan 2018 14:00) (77 - 135)  RR: 15 (11 Jan 2018 14:00) (12 - 50)  SpO2: 100% (11 Jan 2018 14:00) (98% - 100%)    CAPILLARY BLOOD GLUCOSE    PHYSICAL EXAMINATION:  ----------------------------------------    General appearance: NAD, Awake, Alert  HEENT: NCAT, Conjunctiva clear, EOMI  Neck: Supple, No JVD  Lungs: Decreased breath sounds bilaterally  Cardiovascular: S1S2, Irregularly irregular  Abdomen: Soft, Nontender, Positive bowel sounds  Extremities: No clubbing, No cyanosis, No edema  CNS: alert, minimally verbal      LABORATORY STUDIES:  ----------------------------------------                        11.8   9.3   )-----------( 216      ( 11 Jan 2018 06:01 )             36.5     01-11    142  |  108<H>  |  27.0<H>  ----------------------------<  76  3.7   |  21.0<L>  |  1.15    Ca    9.2      11 Jan 2018 06:01  Phos  3.1     01-10  Mg     2.2     01-10    MEDICATIONS  (STANDING):    amLODIPine   Tablet 5 milliGRAM(s) Oral daily  aspirin 325 milliGRAM(s) Oral daily  carvedilol 3.125 milliGRAM(s) Oral every 12 hours  cefepime  IVPB 2000 milliGRAM(s) IV Intermittent every 24 hours  cefepime  IVPB      heparin  Injectable 5000 Unit(s) SubCutaneous every 12 hours  levETIRAcetam 250 milliGRAM(s) Oral two times a day    MEDICATIONS  (PRN):  hydrALAZINE Injectable 10 milliGRAM(s) IV Push every 2 hours PRN SBP> 160  LORazepam     Tablet 0.5 milliGRAM(s) Oral two times a day PRN Anxiety      ASSESSMENT / PLAN:  ----------------------------------------

## 2018-01-11 NOTE — PROGRESS NOTE ADULT - PROBLEM SELECTOR PLAN 5
Poor PO intake and generalized decline in her well being and quality of life.  Need Palliative Care to help with defining Goals of Care

## 2018-01-11 NOTE — PROGRESS NOTE ADULT - PROBLEM SELECTOR PLAN 3
now in sinus with periods of bradycardia   CHADs 2VASC score high cardio rec full AC, pt is fall risk seizure and dementia   continue  full dose aspirin for now  continue coreg for rate control

## 2018-01-11 NOTE — PROGRESS NOTE ADULT - ASSESSMENT
74 year old female patient with UTI/septic shock, AMS, AF w RVR    74 year old female with PMHx of afib , mvp and breast cancer was found to be lethargic by family EMS called who apparently palpated thready pulses and started cpr for few seconds per family when pt woke up. She was transferred by ambulance and came into ER with altered mental status and hypothermic found to be hypotensive . Found to be septic from UTI with shock and AMS, admitted to ICU on levophed. Pt had AF w RVR and received digoxin loading but developed bradycardia.

## 2018-01-11 NOTE — PROGRESS NOTE ADULT - ASSESSMENT
The patient is a 84y Female with seizure, would continue keppra 250 q 12 hours.  no further neuro work up suggested at this time    Thank you for allowing me to participate in the care of your patient    Jose G Gregory MD, PhD   [id number]

## 2018-01-11 NOTE — CONSULT NOTE ADULT - ATTENDING COMMENTS
Pt seen and examined. I spoke with family member, daughter and PA.  Spoke to ICU-PA as well.  Hx of PAF. no hx of CHF.  Pt is deconditioned and may end up bed bound.   She is not optimal for AC due to fall.   We will look at her echo. If LVEF is normal, can try multaq.  cont with warfarin,   I will follow with kevyn
Thank you for the opportunity to assist with the care of this patient.   [facility] [phone].

## 2018-01-11 NOTE — PROGRESS NOTE ADULT - SUBJECTIVE AND OBJECTIVE BOX
Patient is a 84y old  Female who presents with a chief complaint of unresponsive and apnea (2018 23:26)      BRIEF HOSPITAL COURSE: 84F with a history of Dementia HTN, CVA, Breast CA, Afib who presented with lethargy and low BP. Found to have Urosepsis with Corynebacterium in the Urine. She was resuscitated and transiently required Pressors for BP support.  She is now off the Levophed and hemodynamically stable. She will complete a 5 day course of IV Abx.    Of note her dementia is severly advanced. She requires full assist for all of her ADLs and is essentially non-verbal, often with failure to recognize family.     PAST MEDICAL & SURGICAL HISTORY:  Afib  Breast cancer  Mitral valve prolapse  HTN (hypertension)  Dementia  Mitral Valve Prolapse  Depression  CVA (Cerebral Vascular Accident) (ICD9 434.91)  CAD (Coronary Artery Disease) (ICD9 414.00): with STENTS in  apast last CATH  in 10/2009 with 90% MID LAD on Medical managemnt  Dementia (ICD9 294.8)  Anxiety (ICD9 300.00)  GERD (Gastroesophageal Reflux Disease) (ICD9 530.81)  Atrial Fibrillation (ICD9 427.31)  Afib (ICD9 427.31)  Hypothyroidism (ICD9 244.9)  Hx of Breast Cancer (ICD9 V10.3)  Hypercholesterolemia (ICD9 272.0)  HTN  Transient Ischemic Attack (TIA) (ICD9 435.9)  No significant past surgical history  No significant past surgical history  S/P Breast Lumpectomy (ICD9 V45.89)   Section (ICD9 669.70)  After Cataract, Bilateral (ICD9 366.50)      Review of Systems:  CONSTITUTIONAL: No fever, chills, or fatigue  EYES: No eye pain, visual disturbances, or discharge  ENMT:  No difficulty hearing, tinnitus, vertigo; No sinus or throat pain  NECK: No pain or stiffness  RESPIRATORY: No cough, wheezing, chills or hemoptysis; No shortness of breath  CARDIOVASCULAR: No chest pain, palpitations, dizziness, or leg swelling  GASTROINTESTINAL: + Dysphagia, unable to feed herself, poor PO intake   GENITOURINARY: No dysuria, frequency, hematuria, or incontinence  NEUROLOGICAL:  + memory loss, +loss of strength,   SKIN: No itching, burning, rashes, or lesions   MUSCULOSKELETAL: Generalized weakness,   PSYCHIATRIC: No depression, anxiety, mood swings, or difficulty sleeping      Medications:  cefepime  IVPB 2000 milliGRAM(s) IV Intermittent every 24 hours  cefepime  IVPB      amLODIPine   Tablet 5 milliGRAM(s) Oral daily  carvedilol 3.125 milliGRAM(s) Oral every 12 hours  hydrALAZINE Injectable 10 milliGRAM(s) IV Push every 2 hours PRN  aspirin 325 milliGRAM(s) Oral daily  levETIRAcetam 250 milliGRAM(s) Oral two times a day  LORazepam     Tablet 0.5 milliGRAM(s) Oral two times a day PRN  heparin  Injectable 5000 Unit(s) SubCutaneous every 12 hours      ICU Vital Signs Last 24 Hrs  T(C): 36.6 (2018 12:16), Max: 36.6 (2018 04:00)  T(F): 97.8 (2018 12:16), Max: 97.8 (2018 04:00)  HR: 77 (2018 14:00) (59 - 91)  BP: 154/66 (2018 14:00) (96/66 - 186/71)  BP(mean): 95 (2018 14:00) (77 - 135)  ABP: --  ABP(mean): --  RR: 15 (2018 14:00) (12 - 50)  SpO2: 100% (2018 14:00) (98% - 100%)          I&O's Detail    10 Paulino 2018 07:  -  2018 07:00  --------------------------------------------------------  IN:    Solution: 50 mL    Solution: 200 mL  Total IN: 250 mL    OUT:  Total OUT: 0 mL    Total NET: 250 mL      2018 07:01  -  2018 15:08  --------------------------------------------------------  IN:    Solution: 100 mL    Solution: 50 mL    Solution: 300 mL  Total IN: 450 mL    OUT:  Total OUT: 0 mL    Total NET: 450 mL            LABS:                        11.8   9.3   )-----------( 216      ( 2018 06:01 )             36.5     01-11    142  |  108<H>  |  27.0<H>  ----------------------------<  76  3.7   |  21.0<L>  |  1.15    Ca    9.2      2018 06:01  Phos  3.1     01-10  Mg     2.2     01-10            CAPILLARY BLOOD GLUCOSE            CULTURES:  Culture Results:   50,000 - 99,000 CFU/mL Corynebacterium species (18 @ 03:15)  Culture Results:   No growth at 48 hours (18 @ 19:44)  Culture Results:   No growth at 48 hours (18 @ 19:44)      Physical Examination:    General: Frail and elderly appearing , No acute distress. Confused and disoriented, not answering questions    HEENT: Pupils equal, reactive to light.  Symmetric.    PULM: Clear to auscultation bilaterally Decreased at the bases , no significant sputum production    CVS: Regular rate and rhythm, no murmurs, rubs, or gallops    ABD: Soft, nondistended, nontender, normoactive bowel sounds, no masses    EXT: No edema, nontender    SKIN: Warm and well perfused, no rashes noted.    RADIOLOGY: Head CT: IMPRESSION:    No acute intracranial hemorrhage, mass effect, or evidence of acute   territorial infarct.   If clinical symptoms persist, recommend follow-up imaging with MRI brain   if there are no contraindications.

## 2018-01-11 NOTE — PROGRESS NOTE ADULT - SUBJECTIVE AND OBJECTIVE BOX
CHIEF COMPLAINT:   Patient is a 84y old  Female who presents with a chief complaint of unresponsive and apnea.  Pt was leigh with Dig load, Hr is improved.  More awake today.   Episode of NSVT   Dig level is decreasing.     Allergies  latex (Unknown)  No Known Drug Allergies  shellfish (Hives; Rash)  shellfish (Other)  	  MEDICATIONS:  amLODIPine   Tablet 5 milliGRAM(s) Oral daily  hydrALAZINE Injectable 10 milliGRAM(s) IV Push every 2 hours PRN  cefepime  IVPB 2000 milliGRAM(s) IV Intermittent every 24 hours  cefepime  IVPB      aspirin 325 milliGRAM(s) Oral daily  levETIRAcetam  IVPB 250 milliGRAM(s) IV Intermittent every 12 hours  LORazepam     Tablet 0.5 milliGRAM(s) Oral two times a day PRN  pantoprazole   Suspension 40 milliGRAM(s) Oral daily  levothyroxine Injectable 50 MICROGram(s) IV Push <User Schedule>  heparin  Injectable 5000 Unit(s) SubCutaneous every 12 hours    PHYSICAL EXAM:  T(C): 36.6 (18 @ 04:00), Max: 36.6 (18 @ 04:00)  HR: 73 (18 @ 07:00) (56 - 91)  BP: 144/63 (18 @ 07:00) (97/69 - 186/71)  RR: 16 (18 @ 07:00) (10 - 50)  SpO2: 100% (18 @ 07:00) (95% - 100%)  Daily     Daily Weight in k.6 (2018 06:00)  Appearance: Normal	  HEENT:   NC/AT  Eye: Pink Conjunctiva  Lungs: CTA B/L  CVS: RRR, Normal S1 and S2, Pulses: Normal distal pulses.  Neuro: A&O x0    LABS:	 	                        11.8   9.3   )-----------( 216      ( 2018 06:01 )             36.5     01-11    142  |  108<H>  |  27.0<H>  ----------------------------<  76  3.7   |  21.0<L>  |  1.15    Ca    9.2      2018 06:01  Phos  3.1     01-10    TSH: Thyroid Stimulating Hormone, Serum: 3.74 uIU/mL ( @ 06:01)      ASSESSMENT/PLAN:

## 2018-01-11 NOTE — PROGRESS NOTE ADULT - ASSESSMENT
74 year old female patient with UTI/septic shock, AMS, AF w RVR who has now recovered her shock state and is hemodynamically stable.    Given her advanced dementia, her general state of decline, failure to thrive and mutilple  co-morbidities we have asked Palliative care to elavaute the patient and assist with defining goals of care. Her  and daughter appear to be unrealistic in their expectations. She does have a son who is a physician in [place], who seems to understand things better.     At this time she can be transitioned out of the ICU to a medical floor and Dr Moctezuma will assume her care.

## 2018-01-11 NOTE — PROGRESS NOTE ADULT - PROBLEM SELECTOR PROBLEM 6
RN went heard bed alarm and went into pt's room. Pt requested for food. RN asked pt to stay in bed and went to get food. Pt agreed to stay in bed. NA heard a sound and found pt sitting on floor RN heard bed alarm and went into pt's room. Pt requested for food. RN asked pt to stay in bed and went to get food. Pt agreed to stay in bed. NA heard a sound and found pt sitting on floor Advanced care planning/counseling discussion

## 2018-01-11 NOTE — CONSULT NOTE ADULT - SUBJECTIVE AND OBJECTIVE BOX
HPI:84F with PMH as listed admitted  with     PERTINENT PMH REVIEWED: Yes     PAST MEDICAL & SURGICAL HISTORY:  Afib  Breast cancer  Mitral valve prolapse  HTN (hypertension)  Dementia  Mitral Valve Prolapse  Depression  CVA (Cerebral Vascular Accident) (ICD9 434.91)  CAD (Coronary Artery Disease) (ICD9 414.00): with STENTS in  [facility] last CATH  in 10/2009 with 90% MID LAD on Medical managemnt  Dementia (ICD9 294.8)  Anxiety (ICD9 300.00)  GERD (Gastroesophageal Reflux Disease) (ICD9 530.81)  Atrial Fibrillation (ICD9 427.31)  Afib (ICD9 427.31)  Hypothyroidism (ICD9 244.9)  Hx of Breast Cancer (ICD9 V10.3)  Hypercholesterolemia (ICD9 272.0)  HTN  Transient Ischemic Attack (TIA) (ICD9 435.9)  No significant past surgical history  No significant past surgical history  S/P Breast Lumpectomy (ICD9 V45.89)   Section (ICD9 669.70)  After Cataract, Bilateral (ICD9 366.50)    SOCIAL HISTORY:                                     Admitted from:  home  SNF  [facility]     Surrogate - , son, and daughter     FAMILY HISTORY:  No pertinent family history in first degree relatives  Family history of stroke (Mother)    Baseline ADLs (prior to admission):  Dependent      Allergies    latex (Unknown)  No Known Drug Allergies  shellfish (Hives; Rash)  shellfish (Other)    Present Symptoms:     Dyspnea: 0 1 2 3   Nausea/Vomiting: Yes No  Anxiety:  Yes No  Depression: Yes No  Fatigue: Yes No  Loss of appetite: Yes No    Pain:             Character-            Duration-            Effect-            Factors-            Frequency-            Location-            Severity-    Review of Systems: Reviewed                     Negative:                     Positive:  Unable to obtain due to poor mentation   All others negative    MEDICATIONS  (STANDING):  amLODIPine   Tablet 5 milliGRAM(s) Oral daily  aspirin 325 milliGRAM(s) Oral daily  carvedilol 3.125 milliGRAM(s) Oral every 12 hours  cefepime  IVPB 2000 milliGRAM(s) IV Intermittent every 24 hours  cefepime  IVPB      heparin  Injectable 5000 Unit(s) SubCutaneous every 12 hours  levETIRAcetam 250 milliGRAM(s) Oral two times a day  pantoprazole   Suspension 40 milliGRAM(s) Oral daily  potassium chloride  10 mEq/100 mL IVPB 10 milliEquivalent(s) IV Intermittent every 1 hour    MEDICATIONS  (PRN):  hydrALAZINE Injectable 10 milliGRAM(s) IV Push every 2 hours PRN SBP> 160  LORazepam     Tablet 0.5 milliGRAM(s) Oral two times a day PRN Anxiety    PHYSICAL EXAM:    Vital Signs Last 24 Hrs  T(C): 36.6 (2018 12:16), Max: 36.6 (2018 04:00)  T(F): 97.8 (2018 12:16), Max: 97.8 (2018 04:00)  HR: 79 (2018 12:00) (56 - 91)  BP: 134/68 (2018 12:00) (96/66 - 186/71)  BP(mean): 90 (2018 12:00) (77 - 135)  RR: 17 (2018 12:00) (10 - 50)  SpO2: 100% (2018 12:00) (97% - 100%)    General: alert  oriented x ____ lethargic agitated                  cachexia  nonverbal  coma    Karnofsky:  %    HEENT: normal  dry mouth  ET tube/trach    Lungs: comfortable tachypnea/labored breathing  excessive secretions    CV: normal  tachycardia    GI: normal  distended  tender  no BS               PEG/NG/OG tube  constipation  last BM:     : normal  incontinent  oliguria/anuria  huffman    MSK: normal  weakness  edema             ambulatory  bedbound/wheelchair bound    Skin: normal  pressure ulcers- Stage_____  no rash    LABS:                      11.8   9.3   )-----------( 216      ( 2018 06:01 )             36.5     01-11    142  |  108<H>  |  27.0<H>  ----------------------------<  76  3.7   |  21.0<L>  |  1.15    Ca    9.2      2018 06:01  Phos  3.1     01-10  Mg     2.2     -10    I&O's Summary    10 Paulino 2018 07:01  -  2018 07:00  --------------------------------------------------------  IN: 250 mL / OUT: 0 mL / NET: 250 mL    2018 07:  -  2018 12:55  --------------------------------------------------------  IN: 450 mL / OUT: 0 mL / NET: 450 mL    RADIOLOGY & ADDITIONAL STUDIES:    < from: CT Head No Cont (18 @ 16:31) >    No acute intracranial hemorrhage, mass effect, or evidence of acute territorial infarct.     < from: Xray Chest 1 View AP- PORTABLE-Urgent (18 @ 19:32) >  No acute cardiopulmonary disease process.    < from: Xray Chest 1 View AP- PORTABLE-Urgent (18 @ 01:39) >  No acute cardiopulmonary disease process.    ADVANCE DIRECTIVES: Full Code HPI:84F with PMH as listed admitted  with lethargy, found to have septic shock secondary to UTI , maninder. Patient doing better, and now likely being transferred out of the ICU.     PERTINENT PMH REVIEWED: Yes     PAST MEDICAL & SURGICAL HISTORY:  Afib  Breast cancer  Mitral valve prolapse  HTN (hypertension)  Dementia  Mitral Valve Prolapse  Depression  CVA (Cerebral Vascular Accident) (ICD9 434.91)  CAD (Coronary Artery Disease) (ICD9 414.00): with STENTS in  [facility] last CATH  in 10/2009 with 90% MID LAD on Medical managemnt  Dementia (ICD9 294.8)  Anxiety (ICD9 300.00)  GERD (Gastroesophageal Reflux Disease) (ICD9 530.81)  Atrial Fibrillation (ICD9 427.31)  Afib (ICD9 427.31)  Hypothyroidism (ICD9 244.9)  Hx of Breast Cancer (ICD9 V10.3)  Hypercholesterolemia (ICD9 272.0)  HTN  Transient Ischemic Attack (TIA) (ICD9 435.9)  No significant past surgical history  No significant past surgical history  S/P Breast Lumpectomy (ICD9 V45.89)   Section (ICD9 669.70)  After Cataract, Bilateral (ICD9 366.50)    SOCIAL HISTORY:                                     Admitted from:  home       Surrogate - , son, and daughter     FAMILY HISTORY:  No pertinent family history in first degree relatives  Family history of stroke (Mother)    Baseline ADLs (prior to admission):  Dependent      Allergies    latex (Unknown)  No Known Drug Allergies  shellfish (Hives; Rash)  shellfish (Other)    Present Symptoms:     Dyspnea: 0 1 2 3   Nausea/Vomiting: Yes No  Anxiety:  Yes No  Depression: Yes No  Fatigue: Yes No  Loss of appetite: Yes No    Pain:             Character-            Duration-            Effect-            Factors-            Frequency-            Location-            Severity-    Review of Systems: Reviewed                     Negative:                     Positive:  Unable to obtain due to poor mentation   All others negative    MEDICATIONS  (STANDING):  amLODIPine   Tablet 5 milliGRAM(s) Oral daily  aspirin 325 milliGRAM(s) Oral daily  carvedilol 3.125 milliGRAM(s) Oral every 12 hours  cefepime  IVPB 2000 milliGRAM(s) IV Intermittent every 24 hours  cefepime  IVPB      heparin  Injectable 5000 Unit(s) SubCutaneous every 12 hours  levETIRAcetam 250 milliGRAM(s) Oral two times a day  pantoprazole   Suspension 40 milliGRAM(s) Oral daily  potassium chloride  10 mEq/100 mL IVPB 10 milliEquivalent(s) IV Intermittent every 1 hour    MEDICATIONS  (PRN):  hydrALAZINE Injectable 10 milliGRAM(s) IV Push every 2 hours PRN SBP> 160  LORazepam     Tablet 0.5 milliGRAM(s) Oral two times a day PRN Anxiety    PHYSICAL EXAM:    Vital Signs Last 24 Hrs  T(C): 36.6 (2018 12:16), Max: 36.6 (2018 04:00)  T(F): 97.8 (2018 12:16), Max: 97.8 (2018 04:00)  HR: 79 (2018 12:00) (56 - 91)  BP: 134/68 (2018 12:00) (96/66 - 186/71)  BP(mean): 90 (2018 12:00) (77 - 135)  RR: 17 (2018 12:00) (10 - 50)  SpO2: 100% (2018 12:00) (97% - 100%)    General: alert  oriented x ____ lethargic agitated                  cachexia  nonverbal  coma    Karnofsky:  %    HEENT: normal  dry mouth  ET tube/trach    Lungs: comfortable tachypnea/labored breathing  excessive secretions    CV: normal  tachycardia    GI: normal  distended  tender  no BS               PEG/NG/OG tube  constipation  last BM:     : normal  incontinent  oliguria/anuria  huffman    MSK: normal  weakness  edema             ambulatory  bedbound/wheelchair bound    Skin: normal  pressure ulcers- Stage_____  no rash    LABS:                      11.8   9.3   )-----------( 216      ( 2018 06:01 )             36.5     01-11    142  |  108<H>  |  27.0<H>  ----------------------------<  76  3.7   |  21.0<L>  |  1.15    Ca    9.2      2018 06:01  Phos  3.1     01-10  Mg     2.2     01-10    I&O's Summary    10 Paulino 2018 07:01  -  2018 07:00  --------------------------------------------------------  IN: 250 mL / OUT: 0 mL / NET: 250 mL    2018 07:01  -  2018 12:55  --------------------------------------------------------  IN: 450 mL / OUT: 0 mL / NET: 450 mL    RADIOLOGY & ADDITIONAL STUDIES:    < from: CT Head No Cont (18 @ 16:31) >    No acute intracranial hemorrhage, mass effect, or evidence of acute territorial infarct.     < from: Xray Chest 1 View AP- PORTABLE-Urgent (18 @ 19:32) >  No acute cardiopulmonary disease process.    < from: Xray Chest 1 View AP- PORTABLE-Urgent (18 @ 01:39) >  No acute cardiopulmonary disease process.    ADVANCE DIRECTIVES: Full Code HPI:84F with PMH as listed admitted  with lethargy, found to have septic shock secondary to UTI , maninder. Patient doing better, and now likely being transferred out of the ICU.     PERTINENT PMH REVIEWED: Yes     PAST MEDICAL & SURGICAL HISTORY:  Afib  Breast cancer  Mitral valve prolapse  HTN (hypertension)  Dementia  Mitral Valve Prolapse  Depression  CVA (Cerebral Vascular Accident) (ICD9 434.91)  CAD (Coronary Artery Disease) (ICD9 414.00): with STENTS in  [facility] last CATH  in 10/2009 with 90% MID LAD on Medical managemnt  Dementia (ICD9 294.8)  Anxiety (ICD9 300.00)  GERD (Gastroesophageal Reflux Disease) (ICD9 530.81)  Atrial Fibrillation (ICD9 427.31)  Afib (ICD9 427.31)  Hypothyroidism (ICD9 244.9)  Hx of Breast Cancer (ICD9 V10.3)  Hypercholesterolemia (ICD9 272.0)  HTN  Transient Ischemic Attack (TIA) (ICD9 435.9)  No significant past surgical history  No significant past surgical history  S/P Breast Lumpectomy (ICD9 V45.89)   Section (ICD9 669.70)  After Cataract, Bilateral (ICD9 366.50)    SOCIAL HISTORY:   lives home with daughter.                                    Admitted from:  home       Surrogate - , son, and daughter     FAMILY HISTORY:  No pertinent family history in first degree relatives  Family history of stroke (Mother)    Baseline ADLs (prior to admission):  Dependent      Allergies    latex (Unknown)  No Known Drug Allergies  shellfish (Hives; Rash)  shellfish (Other)    Present Symptoms:     Dyspnea: 0    Nausea/Vomiting: No  Anxiety:  No  Depression: No  Fatigue: No  Loss of appetite: No    Pain: none             Character-            Duration-            Effect-            Factors-            Frequency-            Location-            Severity-    Review of Systems: Reviewed              Unable to obtain due to poor mentation   All others negative    MEDICATIONS  (STANDING):  amLODIPine   Tablet 5 milliGRAM(s) Oral daily  aspirin 325 milliGRAM(s) Oral daily  carvedilol 3.125 milliGRAM(s) Oral every 12 hours  cefepime  IVPB 2000 milliGRAM(s) IV Intermittent every 24 hours  cefepime  IVPB      heparin  Injectable 5000 Unit(s) SubCutaneous every 12 hours  levETIRAcetam 250 milliGRAM(s) Oral two times a day  pantoprazole   Suspension 40 milliGRAM(s) Oral daily  potassium chloride  10 mEq/100 mL IVPB 10 milliEquivalent(s) IV Intermittent every 1 hour    MEDICATIONS  (PRN):  hydrALAZINE Injectable 10 milliGRAM(s) IV Push every 2 hours PRN SBP> 160  LORazepam     Tablet 0.5 milliGRAM(s) Oral two times a day PRN Anxiety    PHYSICAL EXAM:    Vital Signs Last 24 Hrs  T(C): 36.6 (2018 12:16), Max: 36.6 (2018 04:00)  T(F): 97.8 (2018 12:16), Max: 97.8 (2018 04:00)  HR: 79 (2018 12:00) (56 - 91)  BP: 134/68 (2018 12:00) (96/66 - 186/71)  BP(mean): 90 (2018 12:00) (77 - 135)  RR: 17 (2018 12:00) (10 - 50)  SpO2: 100% (2018 12:00) (97% - 100%)    General: alert not oriented, can answer yes and no to questions, but not oriented     Karnofsky:  30 %    HEENT: normal     Lungs: comfortable     CV: normal      GI: normal      : normal     MSK: weakness     Skin: no rash    LABS:                      11.8   9.3   )-----------( 216      ( 2018 06:01 )             36.5     01-11    142  |  108<H>  |  27.0<H>  ----------------------------<  76  3.7   |  21.0<L>  |  1.15    Ca    9.2      2018 06:01  Phos  3.1     01-10  Mg     2.2     01-10    I&O's Summary    10 Paulino 2018 07:  -  2018 07:00  --------------------------------------------------------  IN: 250 mL / OUT: 0 mL / NET: 250 mL    2018 07:01  -  2018 12:55  --------------------------------------------------------  IN: 450 mL / OUT: 0 mL / NET: 450 mL    RADIOLOGY & ADDITIONAL STUDIES:    < from: CT Head No Cont (18 @ 16:31) >    No acute intracranial hemorrhage, mass effect, or evidence of acute territorial infarct.     < from: Xray Chest 1 View AP- PORTABLE-Urgent (18 @ 19:32) >  No acute cardiopulmonary disease process.    < from: Xray Chest 1 View AP- PORTABLE-Urgent (18 @ 01:39) >  No acute cardiopulmonary disease process.    ADVANCE DIRECTIVES: Full Code

## 2018-01-11 NOTE — PROGRESS NOTE ADULT - SUBJECTIVE AND OBJECTIVE BOX
[facility]                                     Neurology at [place]                                 Colt Segura & Charan                                  [address]                                        [place], [place], [address]                                             [phone]      Vital signs:  T(C): 36.6 (01-11-18 @ 12:16), Max: 36.6 (01-11-18 @ 04:00)  HR: 77 (01-11-18 @ 19:00) (65 - 91)  BP: 132/73 (01-11-18 @ 19:00) (96/66 - 174/110)  RR: 19 (01-11-18 @ 19:00) (14 - 50)  SpO2: 100% (01-11-18 @ 19:00) (98% - 100%)  Wt(kg): --    Exam:    No new complaints.  no seizures  Awake and alert.  confused, dementia at baseline  Pupils react.  sym smile  5/5 power in 4 ext  no drift  intact FT x 4 ext    EEG slow, but no seizures

## 2018-01-12 DIAGNOSIS — E46 UNSPECIFIED PROTEIN-CALORIE MALNUTRITION: ICD-10-CM

## 2018-01-12 LAB
CULTURE RESULTS: SIGNIFICANT CHANGE UP
CULTURE RESULTS: SIGNIFICANT CHANGE UP
SPECIMEN SOURCE: SIGNIFICANT CHANGE UP
SPECIMEN SOURCE: SIGNIFICANT CHANGE UP

## 2018-01-12 PROCEDURE — 99231 SBSQ HOSP IP/OBS SF/LOW 25: CPT

## 2018-01-12 PROCEDURE — 99232 SBSQ HOSP IP/OBS MODERATE 35: CPT

## 2018-01-12 PROCEDURE — 99233 SBSQ HOSP IP/OBS HIGH 50: CPT

## 2018-01-12 RX ADMIN — AMLODIPINE BESYLATE 5 MILLIGRAM(S): 2.5 TABLET ORAL at 06:26

## 2018-01-12 RX ADMIN — PANTOPRAZOLE SODIUM 40 MILLIGRAM(S): 20 TABLET, DELAYED RELEASE ORAL at 06:30

## 2018-01-12 RX ADMIN — Medication 325 MILLIGRAM(S): at 13:26

## 2018-01-12 RX ADMIN — Medication 100 MICROGRAM(S): at 06:26

## 2018-01-12 RX ADMIN — HEPARIN SODIUM 5000 UNIT(S): 5000 INJECTION INTRAVENOUS; SUBCUTANEOUS at 06:26

## 2018-01-12 RX ADMIN — CEFEPIME 100 MILLIGRAM(S): 1 INJECTION, POWDER, FOR SOLUTION INTRAMUSCULAR; INTRAVENOUS at 16:03

## 2018-01-12 RX ADMIN — HEPARIN SODIUM 5000 UNIT(S): 5000 INJECTION INTRAVENOUS; SUBCUTANEOUS at 17:37

## 2018-01-12 RX ADMIN — CARVEDILOL PHOSPHATE 3.12 MILLIGRAM(S): 80 CAPSULE, EXTENDED RELEASE ORAL at 06:26

## 2018-01-12 RX ADMIN — CARVEDILOL PHOSPHATE 3.12 MILLIGRAM(S): 80 CAPSULE, EXTENDED RELEASE ORAL at 17:37

## 2018-01-12 RX ADMIN — LEVETIRACETAM 250 MILLIGRAM(S): 250 TABLET, FILM COATED ORAL at 17:37

## 2018-01-12 RX ADMIN — LEVETIRACETAM 250 MILLIGRAM(S): 250 TABLET, FILM COATED ORAL at 06:26

## 2018-01-12 NOTE — PROGRESS NOTE ADULT - PROBLEM SELECTOR PLAN 4
on donepezil and Memantine- on hold due to leigh per cardio. restart if HR persistently normal   at home having periods of inattention which was diagnosed as seizure and being treated with Keppra.   neuro eval noted- continue keppra - EEG done no seizures  dementia w/u OP

## 2018-01-12 NOTE — PROGRESS NOTE ADULT - PROBLEM SELECTOR PLAN 3
now in sinus  CHADs 2VASC score high cardio rec full AC, pt is fall risk seizure and dementia   continue  full dose aspirin for now  continue coreg for rate control  discuss with family about AC  DC tele if HR normal.

## 2018-01-12 NOTE — PROGRESS NOTE ADULT - ASSESSMENT
The patient is a 84y Female with dementia and recent onset of seizures several months ago.     Continue Keppra.    Antibiotics per Medicine.     Palliative care note appreciated.     No further specific neurologic recommendations. Will be available as needed.

## 2018-01-12 NOTE — PROGRESS NOTE ADULT - SUBJECTIVE AND OBJECTIVE BOX
HIPOLITO AGUILAR  ----------------------------------------    CC:  jason septic shock due to UTI, hypothermia,  AMS, advanced dementia,    Seen at bedside. denied complaints. answering selectively.     74 year old female with PMHx of afib , mvp and breast cancer was found to be lethargic by family EMS called who apparently palpated thready pulses and started cpr for few seconds per family when pt woke up. She was transferred by ambulance and came into ER with altered mental status and hypothermic found to be hypotensive . Found to be septic from UTI with shock and AMS, admitted to ICU on levophed. Pt had AF w RVR and received digoxin loading but developed bradycardia. HR currently normal. seen by neurology. suggested to c/w keppra. neurology signed off today. dementia w/u OP    ROS: limited due to advanced dementia. denied CP/SOB/palpitations/n.v.d.c/weakness/numbness/ dysuria    Vital Signs Last 24 Hrs  T(C): 36.5 (12 Jan 2018 19:57), Max: 36.5 (12 Jan 2018 19:57)  T(F): 97.7 (12 Jan 2018 19:57), Max: 97.7 (12 Jan 2018 19:57)  HR: 97 (12 Jan 2018 19:57) (86 - 97)  BP: 140/79 (12 Jan 2018 19:57) (140/79 - 148/80)  BP(mean): --  RR: 18 (12 Jan 2018 19:57) (18 - 18)  SpO2: 97% (12 Jan 2018 19:57) (97% - 97%)    CAPILLARY BLOOD GLUCOSE    PHYSICAL EXAMINATION:  ----------------------------------------    General appearance: NAD, Awake, Alert. asnering questions selectively and taking lot of tie to answer questions. not cooperative  HEENT: NCAT, Conjunctiva clear, EOMI  Neck: Supple, No JVD  Lungs: Decreased breath sounds bilaterally  Cardiovascular: S1S2, Irregularly irregular  Abdomen: Soft, Nontender, Positive bowel sounds  Extremities: No clubbing, No cyanosis, No edema  CNS: alert, minimally verbal. moves limbs. withdraw after painful stimuli      LABORATORY STUDIES:  ----------------------------------------                                 11.8   9.3   )-----------( 216      ( 11 Jan 2018 06:01 )             36.5     01-11    142  |  108<H>  |  27.0<H>  ----------------------------<  76  3.7   |  21.0<L>  |  1.15    Ca    9.2      11 Jan 2018 06:01    I&O's Summary    11 Jan 2018 07:01  -  12 Jan 2018 07:00  --------------------------------------------------------  IN: 450 mL / OUT: 0 mL / NET: 450 mL      MEDICATIONS  (STANDING):  amLODIPine   Tablet 5 milliGRAM(s) Oral daily  aspirin 325 milliGRAM(s) Oral daily  carvedilol 3.125 milliGRAM(s) Oral every 12 hours  heparin  Injectable 5000 Unit(s) SubCutaneous every 12 hours  levETIRAcetam 250 milliGRAM(s) Oral two times a day  levothyroxine 100 MICROGram(s) Oral daily  pantoprazole    Tablet 40 milliGRAM(s) Oral before breakfast    MEDICATIONS  (PRN):  hydrALAZINE Injectable 10 milliGRAM(s) IV Push every 2 hours PRN SBP> 160  LORazepam     Tablet 0.5 milliGRAM(s) Oral two times a day PRN Anxiety        ASSESSMENT / PLAN:  ----------------------------------------

## 2018-01-12 NOTE — PROGRESS NOTE ADULT - PROBLEM SELECTOR PLAN 4
-patient with severe malnutrition, cachexia, poor functional status, and with what seems to be pretty advanced dementia (only speaks in 1 word responses occasionally, and barely eating). She is likely hospice appropriate. We are waiting on the daughter to call us back with a time to meet, unfortunately have not received a call back yet.

## 2018-01-12 NOTE — PROGRESS NOTE ADULT - SUBJECTIVE AND OBJECTIVE BOX
[facility]                                        Neurology at [place]                                 Colt Segura, & Charan                                  [address]                                        [place], [place], [address]                                             [phone]        No new complaints.  No seizure.    Vital Signs Last 24 Hrs  T(F): 97.5 (12 Jan 2018 05:26), Max: 97.8 (11 Jan 2018 12:16)  HR: 86 (12 Jan 2018 05:26) (72 - 86)  BP: 145/80 (12 Jan 2018 05:26) (132/73 - 180/96)  BP(mean): 105 (11 Jan 2018 19:46) (90 - 105)  RR: 18 (12 Jan 2018 05:26) (15 - 19)  SpO2: 100% (11 Jan 2018 19:46) (100% - 100%)    Awake and alert.  Minimally verbal.  Not following instructions.   Pupils react.  Face symmetric.  Symmetric limb movement to stimuli.

## 2018-01-12 NOTE — PROGRESS NOTE ADULT - ASSESSMENT
74 year old female patient with UTI/septic shock, AMS, AF w RVR    74 year old female with PMHx of afib , mvp and breast cancer was found to be lethargic by family EMS called who apparently palpated thready pulses and started cpr for few seconds per family when pt woke up. She was transferred by ambulance and came into ER with altered mental status and hypothermic found to be hypotensive . Found to be septic from UTI with shock and AMS, admitted to ICU on levophed. Pt had AF w RVR and received digoxin loading but developed bradycardia. HR currently normal. seen by neurology. suggested to c/w keppra. neurology signed off today. dementia w/u OP

## 2018-01-12 NOTE — PROGRESS NOTE ADULT - SUBJECTIVE AND OBJECTIVE BOX
OVERNIGHT EVENTS: doing okay, no overnight events.     Present Symptoms:     Dyspnea: 0   Nausea/Vomiting: No  Anxiety:  No  Depression: unable   Fatigue: Yes   Loss of appetite: unable     Pain: none             Character-            Duration-            Effect-            Factors-            Frequency-            Location-            Severity-    Review of Systems: Reviewed                 Unable to obtain due to poor mentation   All others negative    MEDICATIONS  (STANDING):  amLODIPine   Tablet 5 milliGRAM(s) Oral daily  aspirin 325 milliGRAM(s) Oral daily  carvedilol 3.125 milliGRAM(s) Oral every 12 hours  cefepime  IVPB 2000 milliGRAM(s) IV Intermittent every 24 hours  cefepime  IVPB      heparin  Injectable 5000 Unit(s) SubCutaneous every 12 hours  levETIRAcetam 250 milliGRAM(s) Oral two times a day  levothyroxine 100 MICROGram(s) Oral daily  pantoprazole    Tablet 40 milliGRAM(s) Oral before breakfast    MEDICATIONS  (PRN):  hydrALAZINE Injectable 10 milliGRAM(s) IV Push every 2 hours PRN SBP> 160  LORazepam     Tablet 0.5 milliGRAM(s) Oral two times a day PRN Anxiety    PHYSICAL EXAM:    Vital Signs Last 24 Hrs  T(C): 36.4 (12 Jan 2018 05:26), Max: 36.6 (11 Jan 2018 12:16)  T(F): 97.5 (12 Jan 2018 05:26), Max: 97.8 (11 Jan 2018 12:16)  HR: 86 (12 Jan 2018 05:26) (72 - 86)  BP: 145/80 (12 Jan 2018 05:26) (132/73 - 180/96)  BP(mean): 105 (11 Jan 2018 19:46) (90 - 105)  RR: 18 (12 Jan 2018 05:26) (15 - 19)  SpO2: 100% (11 Jan 2018 19:46) (100% - 100%)    General: alert; not oriented; not responding to questions, cachexia     Karnofsky:  30 %    HEENT: normal     Lungs: comfortable     CV: normal      GI: normal      : normal      MSK: weakness      Skin: no rash    LABS:                      11.8   9.3   )-----------( 216      ( 11 Jan 2018 06:01 )             36.5     01-11    142  |  108<H>  |  27.0<H>  ----------------------------<  76  3.7   |  21.0<L>  |  1.15    Ca    9.2      11 Jan 2018 06:01          I&O's Summary    11 Jan 2018 07:01  -  12 Jan 2018 07:00  --------------------------------------------------------  IN: 450 mL / OUT: 0 mL / NET: 450 mL        RADIOLOGY & ADDITIONAL STUDIES:    ADVANCE DIRECTIVES:   DNR YES NO  Completed on:                     MOLST  YES NO   Completed on:  Living Will  YES NO   Completed on: OVERNIGHT EVENTS: doing okay, no overnight events.     Present Symptoms:     Dyspnea: 0   Nausea/Vomiting: No  Anxiety:  No  Depression: unable   Fatigue: Yes   Loss of appetite: unable     Pain: none             Character-            Duration-            Effect-            Factors-            Frequency-            Location-            Severity-    Review of Systems: Reviewed                 Unable to obtain due to poor mentation   All others negative    MEDICATIONS  (STANDING):  amLODIPine   Tablet 5 milliGRAM(s) Oral daily  aspirin 325 milliGRAM(s) Oral daily  carvedilol 3.125 milliGRAM(s) Oral every 12 hours  cefepime  IVPB 2000 milliGRAM(s) IV Intermittent every 24 hours  cefepime  IVPB      heparin  Injectable 5000 Unit(s) SubCutaneous every 12 hours  levETIRAcetam 250 milliGRAM(s) Oral two times a day  levothyroxine 100 MICROGram(s) Oral daily  pantoprazole    Tablet 40 milliGRAM(s) Oral before breakfast    MEDICATIONS  (PRN):  hydrALAZINE Injectable 10 milliGRAM(s) IV Push every 2 hours PRN SBP> 160  LORazepam     Tablet 0.5 milliGRAM(s) Oral two times a day PRN Anxiety    PHYSICAL EXAM:    Vital Signs Last 24 Hrs  T(C): 36.4 (12 Jan 2018 05:26), Max: 36.6 (11 Jan 2018 12:16)  T(F): 97.5 (12 Jan 2018 05:26), Max: 97.8 (11 Jan 2018 12:16)  HR: 86 (12 Jan 2018 05:26) (72 - 86)  BP: 145/80 (12 Jan 2018 05:26) (132/73 - 180/96)  BP(mean): 105 (11 Jan 2018 19:46) (90 - 105)  RR: 18 (12 Jan 2018 05:26) (15 - 19)  SpO2: 100% (11 Jan 2018 19:46) (100% - 100%)    General: alert; not oriented; not responding to questions, cachexia     Karnofsky:  30 %    HEENT: normal     Lungs: comfortable     CV: normal      GI: normal      : normal      MSK: weakness      Skin: no rash    LABS:                      11.8   9.3   )-----------( 216      ( 11 Jan 2018 06:01 )             36.5     01-11    142  |  108<H>  |  27.0<H>  ----------------------------<  76  3.7   |  21.0<L>  |  1.15    Ca    9.2      11 Jan 2018 06:01          I&O's Summary    11 Jan 2018 07:01  -  12 Jan 2018 07:00  --------------------------------------------------------  IN: 450 mL / OUT: 0 mL / NET: 450 mL    RADIOLOGY & ADDITIONAL STUDIES:    ADVANCE DIRECTIVES: Full Code

## 2018-01-13 PROCEDURE — 99233 SBSQ HOSP IP/OBS HIGH 50: CPT

## 2018-01-13 RX ORDER — LEVETIRACETAM 250 MG/1
250 TABLET, FILM COATED ORAL ONCE
Qty: 0 | Refills: 0 | Status: COMPLETED | OUTPATIENT
Start: 2018-01-13 | End: 2018-01-13

## 2018-01-13 RX ADMIN — HEPARIN SODIUM 5000 UNIT(S): 5000 INJECTION INTRAVENOUS; SUBCUTANEOUS at 05:27

## 2018-01-13 RX ADMIN — LEVETIRACETAM 400 MILLIGRAM(S): 250 TABLET, FILM COATED ORAL at 19:01

## 2018-01-13 RX ADMIN — Medication 100 MICROGRAM(S): at 05:26

## 2018-01-13 RX ADMIN — HEPARIN SODIUM 5000 UNIT(S): 5000 INJECTION INTRAVENOUS; SUBCUTANEOUS at 17:54

## 2018-01-13 RX ADMIN — PANTOPRAZOLE SODIUM 40 MILLIGRAM(S): 20 TABLET, DELAYED RELEASE ORAL at 05:27

## 2018-01-13 RX ADMIN — CARVEDILOL PHOSPHATE 3.12 MILLIGRAM(S): 80 CAPSULE, EXTENDED RELEASE ORAL at 05:27

## 2018-01-13 RX ADMIN — LEVETIRACETAM 250 MILLIGRAM(S): 250 TABLET, FILM COATED ORAL at 05:26

## 2018-01-13 RX ADMIN — AMLODIPINE BESYLATE 5 MILLIGRAM(S): 2.5 TABLET ORAL at 05:27

## 2018-01-13 NOTE — PROGRESS NOTE ADULT - PROBLEM SELECTOR PLAN 5
Palliative care consulted to discuss the over all Goals of care  patient was living with family. family will let us know about alternative care.

## 2018-01-13 NOTE — PROGRESS NOTE ADULT - SUBJECTIVE AND OBJECTIVE BOX
HIPOLITO AGUILAR  ----------------------------------------    CC:  jason septic shock due to UTI, hypothermia,  AMS, advanced dementia,    Seen at bedside. denied complaints. did not answer any ROS questions    74 year old female with PMHx of afib , mvp and breast cancer was found to be lethargic by family EMS called who apparently palpated thready pulses and started cpr for few seconds per family when pt woke up. She was transferred by ambulance and came into ER with altered mental status and hypothermic found to be hypotensive . Found to be septic from UTI with shock and AMS, admitted to ICU on levophed. Pt had AF w RVR and received digoxin loading but developed bradycardia. HR currently normal. seen by neurology. suggested to c/w keppra. neurology signed off today. dementia w/u OP    ROS: limited due to advanced dementia.     Vital Signs Last 24 Hrs  T(C): 36.4 (13 Jan 2018 16:12), Max: 36.6 (13 Jan 2018 12:28)  T(F): 97.5 (13 Jan 2018 16:12), Max: 97.8 (13 Jan 2018 12:28)  HR: 82 (13 Jan 2018 16:12) (82 - 97)  BP: 139/73 (13 Jan 2018 16:12) (139/73 - 163/86)  BP(mean): --  RR: 18 (13 Jan 2018 12:28) (16 - 18)  SpO2: 96% (13 Jan 2018 12:28) (96% - 100%)  CAPILLARY BLOOD GLUCOSE    PHYSICAL EXAMINATION:  ----------------------------------------    General appearance: NAD, Awake, Alert.  not cooperative  HEENT: NCAT,   Neck: Supple, No JVD  Lungs: Decreased breath sounds bilaterally  Cardiovascular: S1S2, Irregularly irregular  Abdomen: Soft, Nontender,   Extremities: No clubbing, No cyanosis, No edema  CNS: alert, minimally verbal. moves limbs. withdraw after painful stimuli      LABORATORY STUDIES:  ----------------------------------------      I&O's Summary    11 Jan 2018 07:01  -  12 Jan 2018 07:00  --------------------------------------------------------  IN: 450 mL / OUT: 0 mL / NET: 450 mL      MEDICATIONS  (STANDING):  amLODIPine   Tablet 5 milliGRAM(s) Oral daily  aspirin 325 milliGRAM(s) Oral daily  carvedilol 3.125 milliGRAM(s) Oral every 12 hours  heparin  Injectable 5000 Unit(s) SubCutaneous every 12 hours  levETIRAcetam 250 milliGRAM(s) Oral two times a day  levothyroxine 100 MICROGram(s) Oral daily  pantoprazole    Tablet 40 milliGRAM(s) Oral before breakfast    MEDICATIONS  (PRN):  hydrALAZINE Injectable 10 milliGRAM(s) IV Push every 2 hours PRN SBP> 160  LORazepam     Tablet 0.5 milliGRAM(s) Oral two times a day PRN Anxiety        ASSESSMENT / PLAN:  ----------------------------------------

## 2018-01-13 NOTE — PROGRESS NOTE ADULT - PROBLEM SELECTOR PLAN 3
now in sinus  CHADs 2VASC score high cardio rec full AC, pt is fall risk seizure and dementia   continue  full dose aspirin for now.  continue coreg for rate control  discussed with family at bedside about AC. patient was on pradaxa that caused GIB, and she was switched to coumadin. no GIB with caoumadin. family not sure about AC. will let me know. daughter thinks patient will be wheelchair bound and only need assistance in transfer. in that case. AC can be started. but patient was ambulating with RW after DC from [facility] 3 weeks and gait was weak and tendency to fall. In that better to defer AC. risk/benefit/alt discussed

## 2018-01-14 DIAGNOSIS — I47.2 VENTRICULAR TACHYCARDIA: ICD-10-CM

## 2018-01-14 LAB
ANION GAP SERPL CALC-SCNC: 11 MMOL/L — SIGNIFICANT CHANGE UP (ref 5–17)
BUN SERPL-MCNC: 17 MG/DL — SIGNIFICANT CHANGE UP (ref 8–20)
CALCIUM SERPL-MCNC: 9 MG/DL — SIGNIFICANT CHANGE UP (ref 8.6–10.2)
CHLORIDE SERPL-SCNC: 111 MMOL/L — HIGH (ref 98–107)
CO2 SERPL-SCNC: 28 MMOL/L — SIGNIFICANT CHANGE UP (ref 22–29)
CREAT SERPL-MCNC: 0.91 MG/DL — SIGNIFICANT CHANGE UP (ref 0.5–1.3)
GLUCOSE SERPL-MCNC: 90 MG/DL — SIGNIFICANT CHANGE UP (ref 70–115)
MAGNESIUM SERPL-MCNC: 2.1 MG/DL — SIGNIFICANT CHANGE UP (ref 1.8–2.6)
POTASSIUM SERPL-MCNC: 3.9 MMOL/L — SIGNIFICANT CHANGE UP (ref 3.5–5.3)
POTASSIUM SERPL-SCNC: 3.9 MMOL/L — SIGNIFICANT CHANGE UP (ref 3.5–5.3)
SODIUM SERPL-SCNC: 150 MMOL/L — HIGH (ref 135–145)

## 2018-01-14 PROCEDURE — 99231 SBSQ HOSP IP/OBS SF/LOW 25: CPT

## 2018-01-14 PROCEDURE — 99233 SBSQ HOSP IP/OBS HIGH 50: CPT

## 2018-01-14 RX ORDER — MEMANTINE HYDROCHLORIDE 10 MG/1
10 TABLET ORAL DAILY
Qty: 0 | Refills: 0 | Status: DISCONTINUED | OUTPATIENT
Start: 2018-01-14 | End: 2018-01-22

## 2018-01-14 RX ORDER — DONEPEZIL HYDROCHLORIDE 10 MG/1
10 TABLET, FILM COATED ORAL AT BEDTIME
Qty: 0 | Refills: 0 | Status: DISCONTINUED | OUTPATIENT
Start: 2018-01-14 | End: 2018-01-22

## 2018-01-14 RX ORDER — SODIUM CHLORIDE 9 MG/ML
1000 INJECTION INTRAMUSCULAR; INTRAVENOUS; SUBCUTANEOUS
Qty: 0 | Refills: 0 | Status: DISCONTINUED | OUTPATIENT
Start: 2018-01-14 | End: 2018-01-15

## 2018-01-14 RX ADMIN — PANTOPRAZOLE SODIUM 40 MILLIGRAM(S): 20 TABLET, DELAYED RELEASE ORAL at 06:35

## 2018-01-14 RX ADMIN — HEPARIN SODIUM 5000 UNIT(S): 5000 INJECTION INTRAVENOUS; SUBCUTANEOUS at 17:10

## 2018-01-14 RX ADMIN — HEPARIN SODIUM 5000 UNIT(S): 5000 INJECTION INTRAVENOUS; SUBCUTANEOUS at 06:34

## 2018-01-14 RX ADMIN — CARVEDILOL PHOSPHATE 3.12 MILLIGRAM(S): 80 CAPSULE, EXTENDED RELEASE ORAL at 06:35

## 2018-01-14 RX ADMIN — SODIUM CHLORIDE 60 MILLILITER(S): 9 INJECTION INTRAMUSCULAR; INTRAVENOUS; SUBCUTANEOUS at 23:27

## 2018-01-14 RX ADMIN — Medication 325 MILLIGRAM(S): at 14:38

## 2018-01-14 RX ADMIN — DONEPEZIL HYDROCHLORIDE 10 MILLIGRAM(S): 10 TABLET, FILM COATED ORAL at 21:12

## 2018-01-14 RX ADMIN — Medication 100 MICROGRAM(S): at 06:35

## 2018-01-14 RX ADMIN — LEVETIRACETAM 250 MILLIGRAM(S): 250 TABLET, FILM COATED ORAL at 06:35

## 2018-01-14 RX ADMIN — MEMANTINE HYDROCHLORIDE 10 MILLIGRAM(S): 10 TABLET ORAL at 21:12

## 2018-01-14 RX ADMIN — CARVEDILOL PHOSPHATE 3.12 MILLIGRAM(S): 80 CAPSULE, EXTENDED RELEASE ORAL at 17:10

## 2018-01-14 RX ADMIN — AMLODIPINE BESYLATE 5 MILLIGRAM(S): 2.5 TABLET ORAL at 06:35

## 2018-01-14 RX ADMIN — LEVETIRACETAM 250 MILLIGRAM(S): 250 TABLET, FILM COATED ORAL at 17:10

## 2018-01-14 NOTE — PROGRESS NOTE ADULT - SUBJECTIVE AND OBJECTIVE BOX
HIPOLITO JEFF  ----------------------------------------    CC:  jaosn septic shock due to UTI, hypothermia,  AMS, advanced dementia,    Seen at bedside. denied complaints. did not answer any ROS questions. patient refused keppra PO last night. IV keppra was given.     74 year old female with PMHx of afib , mvp and breast cancer was found to be lethargic by family EMS called who apparently palpated thready pulses and started cpr for few seconds per family when pt woke up. She was transferred by ambulance and came into ER with altered mental status and hypothermic found to be hypotensive . Found to be septic from UTI with shock and AMS, admitted to ICU on levophed. Pt had AF w RVR and received digoxin loading but developed bradycardia. HR currently normal. seen by neurology. suggested to c/w keppra. neurology signed off today. dementia w/u OP      tele: 6 beats of non-sustained Vtacy.     ROS: limited due to advanced dementia.         PHYSICAL EXAMINATION:  ----------------------------------------    General appearance: NAD, Awake, Alert.  not cooperative  HEENT: NCAT,   Neck: Supple, No JVD  Lungs: Decreased breath sounds bilaterally  Cardiovascular: S1S2, Irregularly irregular  Abdomen: Soft, Nontender,   Extremities: No clubbing, No cyanosis, No edema  CNS: alert, minimally verbal. moves limbs. withdraw after painful stimuli. resist movements      LABORATORY STUDIES:  ----------------------------------------  I&O's Summary    no labs today

## 2018-01-14 NOTE — PROGRESS NOTE ADULT - PROBLEM SELECTOR PLAN 3
now in sinus  CHADs 2VASC score high cardio rec full AC, pt is fall risk seizure and dementia   continue  full dose aspirin for now.  continue coreg for rate control  discussed with family at bedside about AC. patient was on pradaxa that caused GIB, and she was switched to coumadin. no GIB with coumadin. family not sure about AC. will let me know. daughter thinks patient will be wheelchair bound and only need assistance in transfer. in that case. AC can be started. but patient was ambulating with RW after DC from [facility] 3 weeks and gait was weak and tendency to fall. In that caase, better to defer AC. risk/benefit/alt discussed. awaiting family response. needs cardio f/u

## 2018-01-14 NOTE — CHART NOTE - NSCHARTNOTEFT_GEN_A_CORE
Source: Patient [x ]  Family [ ]   other [ ]    Current Diet:  puree with ensure BID    Patient reports [ ] nausea  [ ] vomiting [ ] diarrhea [ ] constipation  [ ]chewing problems [ ] swallowing issues  [ ] other:     PO intake:  < 50% [x ]   50-75%  [ ]   %  [ ]  other :    Source for PO intake [ ] Patient [ ] family [x ] chart [ ] staff [ ] other    Enteral /Parenteral Nutrition:     Current Weight: 53.3 kg    % Weight Change  fluctuations noted but up 2lbs since admission    Pertinent Medications: MEDICATIONS  (STANDING):  amLODIPine   Tablet 5 milliGRAM(s) Oral daily  aspirin 325 milliGRAM(s) Oral daily  carvedilol 3.125 milliGRAM(s) Oral every 12 hours  heparin  Injectable 5000 Unit(s) SubCutaneous every 12 hours  levETIRAcetam 250 milliGRAM(s) Oral two times a day  levothyroxine 100 MICROGram(s) Oral daily  pantoprazole    Tablet 40 milliGRAM(s) Oral before breakfast    MEDICATIONS  (PRN):  hydrALAZINE Injectable 10 milliGRAM(s) IV Push every 2 hours PRN SBP> 160  LORazepam     Tablet 0.5 milliGRAM(s) Oral two times a day PRN Anxiety    Pertinent Labs:         Skin:     Nutrition focused physical exam conducted - found signs of malnutrition [ ]absent [ x]present    Subcutaneous fat loss: [x ] Orbital fat pads region, [ ]Buccal fat region, [ ]Triceps region,  [ ]Ribs region    Muscle wasting: [x ]Temples region, [ x]Clavicle region, [ ]Shoulder region, [ ]Scapula region, [ ]Interosseous region,  [ ]thigh region, [ ]Calf region    Estimated Needs:   [ x] no change since previous assessment  [ ] recalculated:     Current Nutrition Diagnosis: Pt presents with moderate protein calorie malnutrition, related to insufficient PO intake, as evidenced by po< 25% x 7 days  and physical signs of muscle and fat loss ( temples, orbital, clavicles).    Recommendations:   1) continue ensure  2) appetite stimulating medication  3) encourage PO intake     Monitoring and Evaluation:   [ x] PO intake [x ] Tolerance to diet prescription [X] Weights  [X] Follow up per protocol [X] Labs:

## 2018-01-14 NOTE — PROGRESS NOTE ADULT - PROBLEM SELECTOR PLAN 5
on donepezil and Memantine- on hold due to leigh per cardio. will restart as HR improves and patient became increasingly noncooperative.    at home having periods of inattention which was diagnosed as seizure and being treated with Keppra.   neuro eval noted- continue keppra - EEG done no seizures  dementia w/u OP

## 2018-01-14 NOTE — PROGRESS NOTE ADULT - SUBJECTIVE AND OBJECTIVE BOX
Patient not responsive to questioning. Bed bound.   NSVT noted on tele.   Will increase coreg to 6.25 mg po bid. most likely ischemic but at this time no further intervention.

## 2018-01-14 NOTE — CHART NOTE - NSCHARTNOTEFT_GEN_A_CORE
Upon Nutritional Assessment by the Registered Dietitian your patient was determined to meet criteria / has evidence of the following diagnosis/diagnoses:          [ ]  Mild Protein Calorie Malnutrition        [ ]  Moderate Protein Calorie Malnutrition        [x ] Severe Protein Calorie Malnutrition        [ ] Unspecified Protein Calorie Malnutrition        [ ] Underweight / BMI <19        [ ] Morbid Obesity / BMI > 40      Findings as based on:  •  Comprehensive nutrition assessment and consultation  •  Calorie counts (nutrient intake analysis)  •  Food acceptance and intake status from observations by staff  •  Follow up  •  Patient education  •  Intervention secondary to interdisciplinary rounds  •   concerns      Treatment:    The following diet has been recommended:    increase ensure to TID  appetite stimulating med  PROVIDER Section:     By signing this assessment you are acknowledging and agree with the diagnosis/diagnoses assigned by the Registered Dietitian    Comments:

## 2018-01-15 DIAGNOSIS — E87.0 HYPEROSMOLALITY AND HYPERNATREMIA: ICD-10-CM

## 2018-01-15 DIAGNOSIS — R05 COUGH: ICD-10-CM

## 2018-01-15 LAB
ANION GAP SERPL CALC-SCNC: 10 MMOL/L — SIGNIFICANT CHANGE UP (ref 5–17)
BUN SERPL-MCNC: 16 MG/DL — SIGNIFICANT CHANGE UP (ref 8–20)
CALCIUM SERPL-MCNC: 8.7 MG/DL — SIGNIFICANT CHANGE UP (ref 8.6–10.2)
CHLORIDE SERPL-SCNC: 111 MMOL/L — HIGH (ref 98–107)
CO2 SERPL-SCNC: 27 MMOL/L — SIGNIFICANT CHANGE UP (ref 22–29)
CREAT SERPL-MCNC: 0.91 MG/DL — SIGNIFICANT CHANGE UP (ref 0.5–1.3)
GLUCOSE SERPL-MCNC: 75 MG/DL — SIGNIFICANT CHANGE UP (ref 70–115)
MAGNESIUM SERPL-MCNC: 2.1 MG/DL — SIGNIFICANT CHANGE UP (ref 1.6–2.6)
POTASSIUM SERPL-MCNC: 4 MMOL/L — SIGNIFICANT CHANGE UP (ref 3.5–5.3)
POTASSIUM SERPL-SCNC: 4 MMOL/L — SIGNIFICANT CHANGE UP (ref 3.5–5.3)
SODIUM SERPL-SCNC: 148 MMOL/L — HIGH (ref 135–145)

## 2018-01-15 PROCEDURE — 71045 X-RAY EXAM CHEST 1 VIEW: CPT | Mod: 26

## 2018-01-15 PROCEDURE — 99233 SBSQ HOSP IP/OBS HIGH 50: CPT

## 2018-01-15 RX ORDER — ACETYLCYSTEINE 200 MG/ML
3 VIAL (ML) MISCELLANEOUS EVERY 6 HOURS
Qty: 0 | Refills: 0 | Status: DISCONTINUED | OUTPATIENT
Start: 2018-01-15 | End: 2018-01-16

## 2018-01-15 RX ORDER — PIPERACILLIN AND TAZOBACTAM 4; .5 G/20ML; G/20ML
3.38 INJECTION, POWDER, LYOPHILIZED, FOR SOLUTION INTRAVENOUS EVERY 8 HOURS
Qty: 0 | Refills: 0 | Status: DISCONTINUED | OUTPATIENT
Start: 2018-01-15 | End: 2018-01-22

## 2018-01-15 RX ORDER — FUROSEMIDE 40 MG
40 TABLET ORAL ONCE
Qty: 0 | Refills: 0 | Status: COMPLETED | OUTPATIENT
Start: 2018-01-15 | End: 2018-01-15

## 2018-01-15 RX ADMIN — CARVEDILOL PHOSPHATE 3.12 MILLIGRAM(S): 80 CAPSULE, EXTENDED RELEASE ORAL at 18:08

## 2018-01-15 RX ADMIN — HEPARIN SODIUM 5000 UNIT(S): 5000 INJECTION INTRAVENOUS; SUBCUTANEOUS at 05:22

## 2018-01-15 RX ADMIN — MEMANTINE HYDROCHLORIDE 10 MILLIGRAM(S): 10 TABLET ORAL at 12:49

## 2018-01-15 RX ADMIN — PANTOPRAZOLE SODIUM 40 MILLIGRAM(S): 20 TABLET, DELAYED RELEASE ORAL at 06:08

## 2018-01-15 RX ADMIN — DONEPEZIL HYDROCHLORIDE 10 MILLIGRAM(S): 10 TABLET, FILM COATED ORAL at 22:15

## 2018-01-15 RX ADMIN — Medication 325 MILLIGRAM(S): at 12:49

## 2018-01-15 RX ADMIN — CARVEDILOL PHOSPHATE 3.12 MILLIGRAM(S): 80 CAPSULE, EXTENDED RELEASE ORAL at 05:22

## 2018-01-15 RX ADMIN — Medication 40 MILLIGRAM(S): at 16:59

## 2018-01-15 RX ADMIN — LEVETIRACETAM 250 MILLIGRAM(S): 250 TABLET, FILM COATED ORAL at 05:22

## 2018-01-15 RX ADMIN — Medication 100 MICROGRAM(S): at 05:22

## 2018-01-15 RX ADMIN — AMLODIPINE BESYLATE 5 MILLIGRAM(S): 2.5 TABLET ORAL at 05:23

## 2018-01-15 RX ADMIN — HEPARIN SODIUM 5000 UNIT(S): 5000 INJECTION INTRAVENOUS; SUBCUTANEOUS at 18:08

## 2018-01-15 RX ADMIN — LEVETIRACETAM 250 MILLIGRAM(S): 250 TABLET, FILM COATED ORAL at 18:08

## 2018-01-15 NOTE — PROGRESS NOTE ADULT - PROBLEM SELECTOR PLAN 2
on tele monitor. no further episode last 24 hrs. coreg dose was increased yesterday. normal electrolytes. possible Ischaemic etiology. no intervention planned as per cardio. cardiology f/u. c/w tele monitor

## 2018-01-15 NOTE — PROGRESS NOTE ADULT - SUBJECTIVE AND OBJECTIVE BOX
No further cardiac workup. Patient on appropriate meds. Cleared for discharge from cardiac standpoint.

## 2018-01-15 NOTE — PROGRESS NOTE ADULT - SUBJECTIVE AND OBJECTIVE BOX
HIPOLITO AGUILAR  ----------------------------------------    CC:  jason septic shock due to UTI, hypothermia,  AMS, advanced dementia,    Seen at bedside. complaining of chest congestion cough with sputum. no blood. no chest pain/SOB/palpitation. maynor is more communicating after restarting her Aricept and Namenda.   IVF started yesterday@60 mls/hr  for hypernatremia  NSVT on tele yesterday, no further NSVT in last 24 hrs. seen cardiology. suggested possible Ischaemic etiology, no intervention planned considering her age and current comorbidities        Adm HPI:  74 year old female with PMHx of afib , mvp and breast cancer was found to be lethargic by family EMS called who apparently palpated thready pulses and started cpr for few seconds per family when pt woke up. She was transferred by ambulance and came into ER with altered mental status and hypothermic found to be hypotensive . Found to be septic from UTI with shock and AMS, admitted to ICU on levophed. Pt had AF w RVR and received digoxin loading but developed bradycardia. HR currently normal. seen by neurology. suggested to c/w keppra. neurology signed off today. dementia w/u OP    ROs:  CONSTITUTIONAL:  No distress.no fever/chills  HEENT:  Eyes:  No diplopia or blurred vision.   CARDIOVASCULAR:  No pressure, squeezing, tightness, heaviness or aching about the chest; no palpitations.no leg swelling, no orthopnea or PND  RESPIRATORY:  as per HPI  GI: no nausea, no vomiting, no diarrhea, no constipation.   EXT:No joint pain or joint swelling or redness  CNS: No headaches. No weakness.no numbness.       PHYSICAL EXAMINATION:  ----------------------------------------    Vital Signs Last 24 Hrs  T(C): 36.7 (15 Paulino 2018 06:06), Max: 37.1 (14 Jan 2018 16:36)  T(F): 98 (15 Paulino 2018 06:06), Max: 98.7 (14 Jan 2018 16:36)  HR: 77 (15 Pauil[place] 2018 06:06) (68 - 78)  BP: 155/72 (15 Paulino 2018 06:06) (122/71 - 179/75)  BP(mean): --  RR: 18 (15 Paulino 2018 06:06) (18 - 19)  SpO2: 98% (15 Paulino 2018 06:06) (98% - 100%)    General appearance: NAD, Awake, Alert.  not cooperative. however talking and answering questions. afebrile  HEENT: NCAT,   Neck: Supple, No JVD  Lungs: Decreased breath sounds bilaterally. ?rales. limited as was not taking deep breadth  Cardiovascular: S1S2, Irregularly irregular. SM+  Abdomen: Soft, Nontender,   Extremities: No clubbing, No cyanosis, No edema. no calf tenderness  CNS: alert, minimally verbal. moves limbs. withdraw after painful stimuli. resist movements      LABORATORY STUDIES:  ----------------------------------------  I&O's Summary    no labs today

## 2018-01-15 NOTE — PROGRESS NOTE ADULT - PROBLEM SELECTOR PLAN 3
on donepezil and Memantine- was on hold due to leigh per cardio. restarted as HR improves and patient became increasingly noncooperative. patient is communicating today . will c/w meds. at home having periods of inattention which was diagnosed as seizure and being treated with Keppra.   neuro eval noted- continue keppra - EEG done no seizures

## 2018-01-16 DIAGNOSIS — J18.9 PNEUMONIA, UNSPECIFIED ORGANISM: ICD-10-CM

## 2018-01-16 LAB
ALBUMIN SERPL ELPH-MCNC: 2.8 G/DL — LOW (ref 3.3–5.2)
ALP SERPL-CCNC: 102 U/L — SIGNIFICANT CHANGE UP (ref 40–120)
ALT FLD-CCNC: 19 U/L — SIGNIFICANT CHANGE UP
ANION GAP SERPL CALC-SCNC: 13 MMOL/L — SIGNIFICANT CHANGE UP (ref 5–17)
AST SERPL-CCNC: 32 U/L — HIGH
BILIRUB SERPL-MCNC: 0.4 MG/DL — SIGNIFICANT CHANGE UP (ref 0.4–2)
BUN SERPL-MCNC: 15 MG/DL — SIGNIFICANT CHANGE UP (ref 8–20)
CALCIUM SERPL-MCNC: 8.8 MG/DL — SIGNIFICANT CHANGE UP (ref 8.6–10.2)
CALCIUM SERPL-MCNC: 8.9 MG/DL — SIGNIFICANT CHANGE UP (ref 8.4–10.5)
CHLORIDE SERPL-SCNC: 101 MMOL/L — SIGNIFICANT CHANGE UP (ref 98–107)
CO2 SERPL-SCNC: 31 MMOL/L — HIGH (ref 22–29)
CREAT SERPL-MCNC: 0.79 MG/DL — SIGNIFICANT CHANGE UP (ref 0.5–1.3)
GLUCOSE BLDC GLUCOMTR-MCNC: 109 MG/DL — HIGH (ref 70–99)
GLUCOSE SERPL-MCNC: 92 MG/DL — SIGNIFICANT CHANGE UP (ref 70–115)
HCT VFR BLD CALC: 36.2 % — LOW (ref 37–47)
HGB BLD-MCNC: 11.7 G/DL — LOW (ref 12–16)
MAGNESIUM SERPL-MCNC: 1.7 MG/DL — SIGNIFICANT CHANGE UP (ref 1.6–2.6)
MCHC RBC-ENTMCNC: 30.2 PG — SIGNIFICANT CHANGE UP (ref 27–31)
MCHC RBC-ENTMCNC: 32.3 G/DL — SIGNIFICANT CHANGE UP (ref 32–36)
MCV RBC AUTO: 93.5 FL — SIGNIFICANT CHANGE UP (ref 81–99)
PLATELET # BLD AUTO: 182 K/UL — SIGNIFICANT CHANGE UP (ref 150–400)
POTASSIUM SERPL-MCNC: 3.1 MMOL/L — LOW (ref 3.5–5.3)
POTASSIUM SERPL-SCNC: 3.1 MMOL/L — LOW (ref 3.5–5.3)
PROT SERPL-MCNC: 6.6 G/DL — SIGNIFICANT CHANGE UP (ref 6.6–8.7)
PTH-INTACT FLD-MCNC: 61 PG/ML — SIGNIFICANT CHANGE UP (ref 15–65)
RAPID RVP RESULT: SIGNIFICANT CHANGE UP
RBC # BLD: 3.87 M/UL — LOW (ref 4.4–5.2)
RBC # FLD: 16.3 % — HIGH (ref 11–15.6)
SODIUM SERPL-SCNC: 145 MMOL/L — SIGNIFICANT CHANGE UP (ref 135–145)
WBC # BLD: 22.1 K/UL — HIGH (ref 4.8–10.8)
WBC # FLD AUTO: 22.1 K/UL — HIGH (ref 4.8–10.8)

## 2018-01-16 PROCEDURE — 99233 SBSQ HOSP IP/OBS HIGH 50: CPT

## 2018-01-16 RX ORDER — POTASSIUM CHLORIDE 20 MEQ
40 PACKET (EA) ORAL EVERY 4 HOURS
Qty: 0 | Refills: 0 | Status: COMPLETED | OUTPATIENT
Start: 2018-01-16 | End: 2018-01-16

## 2018-01-16 RX ORDER — OXYCODONE AND ACETAMINOPHEN 5; 325 MG/1; MG/1
1 TABLET ORAL EVERY 8 HOURS
Qty: 0 | Refills: 0 | Status: DISCONTINUED | OUTPATIENT
Start: 2018-01-16 | End: 2018-01-16

## 2018-01-16 RX ORDER — ASPIRIN/CALCIUM CARB/MAGNESIUM 324 MG
81 TABLET ORAL DAILY
Qty: 0 | Refills: 0 | Status: DISCONTINUED | OUTPATIENT
Start: 2018-01-16 | End: 2018-01-22

## 2018-01-16 RX ORDER — ACETYLCYSTEINE 200 MG/ML
6 VIAL (ML) MISCELLANEOUS EVERY 6 HOURS
Qty: 0 | Refills: 0 | Status: DISCONTINUED | OUTPATIENT
Start: 2018-01-16 | End: 2018-01-16

## 2018-01-16 RX ORDER — POLYETHYLENE GLYCOL 3350 17 G/17G
17 POWDER, FOR SOLUTION ORAL ONCE
Qty: 0 | Refills: 0 | Status: DISCONTINUED | OUTPATIENT
Start: 2018-01-16 | End: 2018-01-16

## 2018-01-16 RX ORDER — APIXABAN 2.5 MG/1
2.5 TABLET, FILM COATED ORAL EVERY 12 HOURS
Qty: 0 | Refills: 0 | Status: DISCONTINUED | OUTPATIENT
Start: 2018-01-16 | End: 2018-01-22

## 2018-01-16 RX ADMIN — AMLODIPINE BESYLATE 5 MILLIGRAM(S): 2.5 TABLET ORAL at 06:13

## 2018-01-16 RX ADMIN — PIPERACILLIN AND TAZOBACTAM 25 GRAM(S): 4; .5 INJECTION, POWDER, LYOPHILIZED, FOR SOLUTION INTRAVENOUS at 18:12

## 2018-01-16 RX ADMIN — CARVEDILOL PHOSPHATE 3.12 MILLIGRAM(S): 80 CAPSULE, EXTENDED RELEASE ORAL at 06:09

## 2018-01-16 RX ADMIN — CARVEDILOL PHOSPHATE 3.12 MILLIGRAM(S): 80 CAPSULE, EXTENDED RELEASE ORAL at 18:12

## 2018-01-16 RX ADMIN — PIPERACILLIN AND TAZOBACTAM 25 GRAM(S): 4; .5 INJECTION, POWDER, LYOPHILIZED, FOR SOLUTION INTRAVENOUS at 02:17

## 2018-01-16 RX ADMIN — LEVETIRACETAM 250 MILLIGRAM(S): 250 TABLET, FILM COATED ORAL at 18:12

## 2018-01-16 RX ADMIN — PIPERACILLIN AND TAZOBACTAM 25 GRAM(S): 4; .5 INJECTION, POWDER, LYOPHILIZED, FOR SOLUTION INTRAVENOUS at 13:10

## 2018-01-16 RX ADMIN — Medication 200 MILLIGRAM(S): at 23:41

## 2018-01-16 RX ADMIN — APIXABAN 2.5 MILLIGRAM(S): 2.5 TABLET, FILM COATED ORAL at 18:12

## 2018-01-16 RX ADMIN — Medication 40 MILLIEQUIVALENT(S): at 13:10

## 2018-01-16 RX ADMIN — MEMANTINE HYDROCHLORIDE 10 MILLIGRAM(S): 10 TABLET ORAL at 13:10

## 2018-01-16 RX ADMIN — HEPARIN SODIUM 5000 UNIT(S): 5000 INJECTION INTRAVENOUS; SUBCUTANEOUS at 06:09

## 2018-01-16 RX ADMIN — PANTOPRAZOLE SODIUM 40 MILLIGRAM(S): 20 TABLET, DELAYED RELEASE ORAL at 06:10

## 2018-01-16 RX ADMIN — Medication 81 MILLIGRAM(S): at 13:12

## 2018-01-16 RX ADMIN — DONEPEZIL HYDROCHLORIDE 10 MILLIGRAM(S): 10 TABLET, FILM COATED ORAL at 21:06

## 2018-01-16 RX ADMIN — Medication 40 MILLIEQUIVALENT(S): at 18:12

## 2018-01-16 RX ADMIN — Medication 100 MICROGRAM(S): at 06:09

## 2018-01-16 RX ADMIN — LEVETIRACETAM 250 MILLIGRAM(S): 250 TABLET, FILM COATED ORAL at 06:10

## 2018-01-16 NOTE — PROGRESS NOTE ADULT - PROBLEM SELECTOR PLAN 1
now in sinus  CHADs 2VASC score high cardio rec full AC, pt is fall risk seizure and dementia   continue  full dose aspirin for now.  continue coreg for rate control  discussed with family at bedside about AC. patient was on pradaxa that caused GIB, and she was switched to coumadin. no GIB with coumadin. family not sure about AC. will let me know. daughter thinks patient will be wheelchair bound and only need assistance in transfer. in that case. AC can be started. but patient was ambulating with RW after DC from [facility] 3 weeks and gait was weak and tendency to fall. In that case, better to defer AC. risk/benefit/alt discussed. awaiting family response. cardio f/u now in sinus  CHADs 2VASC score high cardio rec full AC, pt is fall risk seizure and dementia   continue coreg for rate control  start eliquis.  cardiology signed off. suggested no active intervention. family agreed

## 2018-01-16 NOTE — PROGRESS NOTE ADULT - ASSESSMENT
74 year old female patient with UTI/septic shock, AMS, AF w RVR    74 year old female with PMHx of afib , mvp and breast cancer was found to be lethargic by family EMS called who apparently palpated thready pulses and started cpr for few seconds per family when pt woke up. She was transferred by ambulance and came into ER with altered mental status and hypothermic found to be hypotensive . Found to be septic from UTI with shock and AMS, admitted to ICU on levophed. Pt had AF w RVR and received digoxin loading but developed bradycardia. HR currently normal. seen by neurology. suggested to c/w keppra. neurology signed off today. dementia w/u OP 74 year old female patient with UTI/septic shock, AMS, AF w RVR    74 year old female with PMHx of afib , mvp and breast cancer was found to be lethargic by family EMS called who apparently palpated thready pulses and started cpr for few seconds per family when pt woke up. She was transferred by ambulance and came into ER with altered mental status and hypothermic found to be hypotensive . Found to be septic from UTI with shock and AMS, admitted to ICU on levophed. Pt had AF w RVR and received digoxin loading but developed bradycardia. HR currently normal. seen by neurology. suggested to c/w keppra. neurology signed off. Seen at bedside. did not answer question  CXr showed consolidation, right effusion, wbc went up. zosyn was started for possible aspiration PNA. mild temp.   spoke to MS. Andrea Salgado @ [phone]. she had family meeting and family decided to start with PO NOAC. discussed about options with her and risk/benefit proles of different NOACs. she is ok with Eliquis. Family want her to go KATHY after DC. seen by PT today. suggested KATHY vs home/assisst . informed her about CXr findings, NSVT and cardiology sugegsttion. family does not want any cardiac intervention at this time.

## 2018-01-16 NOTE — PROGRESS NOTE ADULT - PROBLEM SELECTOR PROBLEM 4
Advanced care planning/counseling discussion Dementia without behavioral disturbance, unspecified dementia type

## 2018-01-16 NOTE — PHYSICAL THERAPY INITIAL EVALUATION ADULT - GAIT PATTERN USED, PT EVAL
decreased gait velocity, vera step length and activity tolerance, unsteadiness throughout task requiring assistance, verbal cues throughout session for sequencing and to re-direct to task

## 2018-01-16 NOTE — PHYSICAL THERAPY INITIAL EVALUATION ADULT - ADDITIONAL COMMENTS
pt a poor historian, as per medical chart and CLARITZA pt has family to assist throughout the day, unknown exact prior level of function

## 2018-01-16 NOTE — PROGRESS NOTE ADULT - ATTENDING COMMENTS
Sw and PT eval. DC planning. may need KATHY. however very noncooperative due to advanced dementia. Sw and PT eval. DC planning. need KATHY. however very noncooperative due to advanced dementia.

## 2018-01-16 NOTE — PROGRESS NOTE ADULT - PROBLEM SELECTOR PLAN 3
on donepezil and Memantine- was on hold due to leigh per cardio. restarted as HR improves and patient became increasingly noncooperative. patient is communicating today . will c/w meds. at home having periods of inattention which was diagnosed as seizure and being treated with Keppra.   neuro eval noted- continue keppra - EEG done no seizures on zosyn. mild temp. wbc wnl. bc pending. consider ID cx. aspiration precaution. incentive spirometry. suctioning PRN. seen by RT. no secretion. does not need Mucomyst.

## 2018-01-16 NOTE — PROGRESS NOTE ADULT - PROBLEM SELECTOR PLAN 4
Palliative care consulted to discuss the over all Goals of care  patient was living with family. family will let us know about alternative care. on donepezil and Memantine- was on hold due to leigh per cardio. restarted as HR improves and patient became increasingly noncooperative. patient is communicating today . will c/w meds. at home having periods of inattention which was diagnosed as seizure and being treated with Keppra.   neuro eval noted- continue keppra - EEG done no seizures

## 2018-01-16 NOTE — CHART NOTE - NSCHARTNOTEFT_GEN_A_CORE
2 sets of Blood Cultures drawn 1/16/18 at 2:00. Aseptic technique used, tourniquet removed. Minimal blood loss.

## 2018-01-16 NOTE — PROGRESS NOTE ADULT - PROBLEM SELECTOR PLAN 6
on NS 0.225%@ 60 mls/hr. monitor lytes improved. off NS 0.225%@ 60 mls/hr. monitor lytes. hypomag: on supplement. monitor electrolytes

## 2018-01-16 NOTE — PROGRESS NOTE ADULT - PROBLEM SELECTOR PLAN 5
r/o fluid overload/PNA. no fever. CXR. RVP. lasix IV if fluid overload. Palliative care consulted to discuss the over all Goals of care  patient was living with family. family will let us know about alternative care. needs palliative care follow up

## 2018-01-16 NOTE — PROGRESS NOTE ADULT - PROBLEM SELECTOR PLAN 2
on tele monitor. no further episode last 24 hrs. coreg dose was increased yesterday. normal electrolytes. possible Ischaemic etiology. no intervention planned as per cardio. cardiology f/u. c/w tele monitor on tele monitor. no further episode last 24 hrs. c/w coreg. normal electrolytes. possible Ischaemic etiology. no intervention planned as per cardio. cardiology f/u. c/w tele monitor. as above

## 2018-01-16 NOTE — PROGRESS NOTE ADULT - SUBJECTIVE AND OBJECTIVE BOX
HIPOLITO SALGADO  ----------------------------------------    CC:  jason septic shock due to UTI, hypothermia,  AMS, advanced dementia,    Seen at bedside. did not answer question  CXr showed consolidation, right effusion, wbc went up. zosyn was started for possible aspiration PNA. mild temp.   spoke to MS. Andrea Salgado @ [phone].    Adm HPI:  74 year old female with PMHx of afib , mvp and breast cancer was found to be lethargic by family EMS called who apparently palpated thready pulses and started cpr for few seconds per family when pt woke up. She was transferred by ambulance and came into ER with altered mental status and hypothermic found to be hypotensive . Found to be septic from UTI with shock and AMS, admitted to ICU on levophed. Pt had AF w RVR and received digoxin loading but developed bradycardia. HR currently normal. seen by neurology. suggested to c/w keppra. neurology signed off today. dementia w/u OP    ROs:  did not answer qs      PHYSICAL EXAMINATION:  ----------------------------------------    Vital Signs Last 24 Hrs  T(C): 36.7 (15 Paulino 2018 06:06), Max: 37.1 (14 Jan 2018 16:36)  T(F): 98 (15 Paulino 2018 06:06), Max: 98.7 (14 Jan 2018 16:36)  HR: 77 (15 Paulino 2018 06:06) (68 - 78)  BP: 155/72 (15 Paulino 2018 06:06) (122/71 - 179/75)  BP(mean): --  RR: 18 (15 Paulino 2018 06:06) (18 - 19)  SpO2: 98% (15 Paulino 2018 06:06) (98% - 100%)    General appearance: NAD, Awake, Alert.  not cooperative. however talking and answering questions. afebrile  HEENT: NCAT,   Neck: Supple, No JVD  Lungs: Decreased breath sounds bilaterally. ?rales. limited as was not taking deep breadth  Cardiovascular: S1S2, Irregularly irregular. SM+  Abdomen: Soft, Nontender,   Extremities: No clubbing, No cyanosis, No edema. no calf tenderness  CNS: alert, minimally verbal. moves limbs. withdraw after painful stimuli. resist movements      LABORATORY STUDIES:  ----------------------------------------  I&O's Summary    no labs today HIPOLITO SALGADO  ----------------------------------------    CC:  jason septic shock due to UTI, hypothermia,  AMS, advanced dementia,    Seen at bedside. did not answer question  CXr showed consolidation, right effusion, wbc went up. zosyn was started for possible aspiration PNA. mild temp.   spoke to MS. Andrea Salgado @ [phone]. she had family meeting and family decided to start with PO NOAC. discussed about options with her and risk/benefit proles of different NOACs. she is ok with Eliquis. Family want her to go KATHY after DC. seen by PT today. suggested KATHY vs home/assisst . informed her about CXr findings, NSVT and cardiology sugegsttion. family does not want any cardiac intervention at this time.    Adm HPI:  74 year old female with PMHx of afib , mvp and breast cancer was found to be lethargic by family EMS called who apparently palpated thready pulses and started cpr for few seconds per family when pt woke up. She was transferred by ambulance and came into ER with altered mental status and hypothermic found to be hypotensive . Found to be septic from UTI with shock and AMS, admitted to ICU on levophed. Pt had AF w RVR and received digoxin loading but developed bradycardia. HR currently normal. seen by neurology. suggested to c/w keppra. neurology signed off today. dementia w/u OP    ROs:  did not answer qs      PHYSICAL EXAMINATION:  ----------------------------------------  Vital Signs Last 24 Hrs  T(C): 37.2 (16 Jan 2018 10:09), Max: 37.4 (16 Jan 2018 04:35)  T(F): 98.9 (16 Jan 2018 10:09), Max: 99.4 (16 Jan 2018 04:35)  HR: 72 (16 Jan 2018 10:09) (72 - 82)  BP: 103/67 (16 Jan 2018 10:09) (103/67 - 188/88)  BP(mean): --  RR: 18 (16 Jan 2018 10:09) (18 - 18)  SpO2: 98% (16 Jan 2018 10:09) (94% - 98%)    General appearance: NAD, Awake, Alert.  not cooperative. did not answer qs today. mild fever  HEENT: NCAT,   Neck: Supple, No JVD  Lungs: Decreased breath sounds bilaterally. ?rales. limited as was not taking deep breadth  Cardiovascular: S1S2, Irregularly irregular. SM+  Abdomen: Soft, Nontender,   Extremities: No clubbing, No cyanosis, No edema. no calf tenderness  CNS: alert, minimally verbal. moves limbs. withdraw after painful stimuli. resist movements      LABORATORY STUDIES:  ----------------------------------------                          11.7   22.1  )-----------( 182      ( 16 Jan 2018 05:54 )             36.2       01-16    145  |  101  |  15.0  ----------------------------<  92  3.1<L>   |  31.0<H>  |  0.79    Ca    8.8      16 Jan 2018 05:54  Mg     1.7     01-16    TPro  6.6  /  Alb  2.8<L>  /  TBili  0.4  /  DBili  x   /  AST  32<H>  /  ALT  19  /  AlkPhos  102  01-16

## 2018-01-17 DIAGNOSIS — J18.9 PNEUMONIA, UNSPECIFIED ORGANISM: ICD-10-CM

## 2018-01-17 DIAGNOSIS — D72.823 LEUKEMOID REACTION: ICD-10-CM

## 2018-01-17 DIAGNOSIS — R50.9 FEVER, UNSPECIFIED: ICD-10-CM

## 2018-01-17 LAB
ANION GAP SERPL CALC-SCNC: 11 MMOL/L — SIGNIFICANT CHANGE UP (ref 5–17)
APPEARANCE UR: CLEAR — SIGNIFICANT CHANGE UP
BASOPHILS # BLD AUTO: 0 K/UL — SIGNIFICANT CHANGE UP (ref 0–0.2)
BASOPHILS # BLD AUTO: 0 K/UL — SIGNIFICANT CHANGE UP (ref 0–0.2)
BASOPHILS NFR BLD AUTO: 0.1 % — SIGNIFICANT CHANGE UP (ref 0–2)
BASOPHILS NFR BLD AUTO: 0.3 % — SIGNIFICANT CHANGE UP (ref 0–2)
BILIRUB UR-MCNC: NEGATIVE — SIGNIFICANT CHANGE UP
BLD GP AB SCN SERPL QL: SIGNIFICANT CHANGE UP
BUN SERPL-MCNC: 16 MG/DL — SIGNIFICANT CHANGE UP (ref 8–20)
CALCIUM SERPL-MCNC: 9 MG/DL — SIGNIFICANT CHANGE UP (ref 8.6–10.2)
CHLORIDE SERPL-SCNC: 104 MMOL/L — SIGNIFICANT CHANGE UP (ref 98–107)
CO2 SERPL-SCNC: 30 MMOL/L — HIGH (ref 22–29)
COLOR SPEC: YELLOW — SIGNIFICANT CHANGE UP
CREAT SERPL-MCNC: 1.04 MG/DL — SIGNIFICANT CHANGE UP (ref 0.5–1.3)
DIFF PNL FLD: ABNORMAL
EOSINOPHIL # BLD AUTO: 0.1 K/UL — SIGNIFICANT CHANGE UP (ref 0–0.5)
EOSINOPHIL # BLD AUTO: 0.1 K/UL — SIGNIFICANT CHANGE UP (ref 0–0.5)
EOSINOPHIL NFR BLD AUTO: 0.5 % — SIGNIFICANT CHANGE UP (ref 0–6)
EOSINOPHIL NFR BLD AUTO: 0.6 % — SIGNIFICANT CHANGE UP (ref 0–6)
EPI CELLS # UR: ABNORMAL
GAS PNL BLDA: SIGNIFICANT CHANGE UP
GLUCOSE BLDC GLUCOMTR-MCNC: 101 MG/DL — HIGH (ref 70–99)
GLUCOSE BLDC GLUCOMTR-MCNC: 76 MG/DL — SIGNIFICANT CHANGE UP (ref 70–99)
GLUCOSE BLDC GLUCOMTR-MCNC: 86 MG/DL — SIGNIFICANT CHANGE UP (ref 70–99)
GLUCOSE SERPL-MCNC: 100 MG/DL — SIGNIFICANT CHANGE UP (ref 70–115)
GLUCOSE UR QL: NEGATIVE MG/DL — SIGNIFICANT CHANGE UP
HCT VFR BLD CALC: 30.2 % — LOW (ref 37–47)
HCT VFR BLD CALC: 32.5 % — LOW (ref 37–47)
HGB BLD-MCNC: 10.1 G/DL — LOW (ref 12–16)
HGB BLD-MCNC: 10.5 G/DL — LOW (ref 12–16)
KETONES UR-MCNC: NEGATIVE — SIGNIFICANT CHANGE UP
LEUKOCYTE ESTERASE UR-ACNC: ABNORMAL
LYMPHOCYTES # BLD AUTO: 0.4 K/UL — LOW (ref 1–4.8)
LYMPHOCYTES # BLD AUTO: 0.5 K/UL — LOW (ref 1–4.8)
LYMPHOCYTES # BLD AUTO: 3.8 % — LOW (ref 20–55)
LYMPHOCYTES # BLD AUTO: 3.8 % — LOW (ref 20–55)
MAGNESIUM SERPL-MCNC: 1.7 MG/DL — SIGNIFICANT CHANGE UP (ref 1.6–2.6)
MCHC RBC-ENTMCNC: 30.3 PG — SIGNIFICANT CHANGE UP (ref 27–31)
MCHC RBC-ENTMCNC: 31.4 PG — HIGH (ref 27–31)
MCHC RBC-ENTMCNC: 32.3 G/DL — SIGNIFICANT CHANGE UP (ref 32–36)
MCHC RBC-ENTMCNC: 33.4 G/DL — SIGNIFICANT CHANGE UP (ref 32–36)
MCV RBC AUTO: 93.8 FL — SIGNIFICANT CHANGE UP (ref 81–99)
MCV RBC AUTO: 93.9 FL — SIGNIFICANT CHANGE UP (ref 81–99)
MONOCYTES # BLD AUTO: 0.8 K/UL — SIGNIFICANT CHANGE UP (ref 0–0.8)
MONOCYTES # BLD AUTO: 1.2 K/UL — HIGH (ref 0–0.8)
MONOCYTES NFR BLD AUTO: 10.5 % — HIGH (ref 3–10)
MONOCYTES NFR BLD AUTO: 6 % — SIGNIFICANT CHANGE UP (ref 3–10)
NEUTROPHILS # BLD AUTO: 12.4 K/UL — HIGH (ref 1.8–8)
NEUTROPHILS # BLD AUTO: 9.8 K/UL — HIGH (ref 1.8–8)
NEUTROPHILS NFR BLD AUTO: 84 % — HIGH (ref 37–73)
NEUTROPHILS NFR BLD AUTO: 89.1 % — HIGH (ref 37–73)
NITRITE UR-MCNC: NEGATIVE — SIGNIFICANT CHANGE UP
PH UR: 6 — SIGNIFICANT CHANGE UP (ref 5–8)
PLATELET # BLD AUTO: 161 K/UL — SIGNIFICANT CHANGE UP (ref 150–400)
PLATELET # BLD AUTO: 178 K/UL — SIGNIFICANT CHANGE UP (ref 150–400)
POTASSIUM SERPL-MCNC: 3.9 MMOL/L — SIGNIFICANT CHANGE UP (ref 3.5–5.3)
POTASSIUM SERPL-SCNC: 3.9 MMOL/L — SIGNIFICANT CHANGE UP (ref 3.5–5.3)
PROT UR-MCNC: 30 MG/DL
RBC # BLD: 3.22 M/UL — LOW (ref 4.4–5.2)
RBC # BLD: 3.46 M/UL — LOW (ref 4.4–5.2)
RBC # FLD: 16.6 % — HIGH (ref 11–15.6)
RBC # FLD: 16.8 % — HIGH (ref 11–15.6)
RBC CASTS # UR COMP ASSIST: ABNORMAL /HPF (ref 0–4)
SODIUM SERPL-SCNC: 145 MMOL/L — SIGNIFICANT CHANGE UP (ref 135–145)
SP GR SPEC: 1.01 — SIGNIFICANT CHANGE UP (ref 1.01–1.02)
TYPE + AB SCN PNL BLD: SIGNIFICANT CHANGE UP
UROBILINOGEN FLD QL: NEGATIVE MG/DL — SIGNIFICANT CHANGE UP
WBC # BLD: 13.9 K/UL — HIGH (ref 4.8–10.8)
WBC # BLD: 13.9 K/UL — HIGH (ref 4.8–10.8)
WBC # FLD AUTO: 13.9 K/UL — HIGH (ref 4.8–10.8)
WBC # FLD AUTO: 13.9 K/UL — HIGH (ref 4.8–10.8)
WBC UR QL: SIGNIFICANT CHANGE UP

## 2018-01-17 PROCEDURE — 99222 1ST HOSP IP/OBS MODERATE 55: CPT

## 2018-01-17 PROCEDURE — 71250 CT THORAX DX C-: CPT | Mod: 26

## 2018-01-17 PROCEDURE — 99233 SBSQ HOSP IP/OBS HIGH 50: CPT

## 2018-01-17 PROCEDURE — 71045 X-RAY EXAM CHEST 1 VIEW: CPT | Mod: 26,77

## 2018-01-17 PROCEDURE — 71045 X-RAY EXAM CHEST 1 VIEW: CPT | Mod: 26

## 2018-01-17 RX ORDER — ALBUTEROL 90 UG/1
2.5 AEROSOL, METERED ORAL EVERY 8 HOURS
Qty: 0 | Refills: 0 | Status: DISCONTINUED | OUTPATIENT
Start: 2018-01-17 | End: 2018-01-22

## 2018-01-17 RX ORDER — MAGNESIUM SULFATE 500 MG/ML
2 VIAL (ML) INJECTION ONCE
Qty: 0 | Refills: 0 | Status: COMPLETED | OUTPATIENT
Start: 2018-01-17 | End: 2018-01-17

## 2018-01-17 RX ORDER — AZITHROMYCIN 500 MG/1
500 TABLET, FILM COATED ORAL ONCE
Qty: 0 | Refills: 0 | Status: COMPLETED | OUTPATIENT
Start: 2018-01-17 | End: 2018-01-17

## 2018-01-17 RX ORDER — ACETYLCYSTEINE 200 MG/ML
6 VIAL (ML) MISCELLANEOUS EVERY 8 HOURS
Qty: 0 | Refills: 0 | Status: DISCONTINUED | OUTPATIENT
Start: 2018-01-17 | End: 2018-01-18

## 2018-01-17 RX ORDER — ACETAMINOPHEN 500 MG
650 TABLET ORAL EVERY 6 HOURS
Qty: 0 | Refills: 0 | Status: DISCONTINUED | OUTPATIENT
Start: 2018-01-17 | End: 2018-01-22

## 2018-01-17 RX ORDER — VANCOMYCIN HCL 1 G
1000 VIAL (EA) INTRAVENOUS ONCE
Qty: 0 | Refills: 0 | Status: COMPLETED | OUTPATIENT
Start: 2018-01-17 | End: 2018-01-17

## 2018-01-17 RX ORDER — ACETYLCYSTEINE 200 MG/ML
3 VIAL (ML) MISCELLANEOUS EVERY 8 HOURS
Qty: 0 | Refills: 0 | Status: DISCONTINUED | OUTPATIENT
Start: 2018-01-17 | End: 2018-01-17

## 2018-01-17 RX ADMIN — LEVETIRACETAM 250 MILLIGRAM(S): 250 TABLET, FILM COATED ORAL at 17:45

## 2018-01-17 RX ADMIN — PIPERACILLIN AND TAZOBACTAM 25 GRAM(S): 4; .5 INJECTION, POWDER, LYOPHILIZED, FOR SOLUTION INTRAVENOUS at 05:20

## 2018-01-17 RX ADMIN — PANTOPRAZOLE SODIUM 40 MILLIGRAM(S): 20 TABLET, DELAYED RELEASE ORAL at 06:49

## 2018-01-17 RX ADMIN — MEMANTINE HYDROCHLORIDE 10 MILLIGRAM(S): 10 TABLET ORAL at 14:11

## 2018-01-17 RX ADMIN — APIXABAN 2.5 MILLIGRAM(S): 2.5 TABLET, FILM COATED ORAL at 17:45

## 2018-01-17 RX ADMIN — AMLODIPINE BESYLATE 5 MILLIGRAM(S): 2.5 TABLET ORAL at 06:49

## 2018-01-17 RX ADMIN — PIPERACILLIN AND TAZOBACTAM 25 GRAM(S): 4; .5 INJECTION, POWDER, LYOPHILIZED, FOR SOLUTION INTRAVENOUS at 12:25

## 2018-01-17 RX ADMIN — ALBUTEROL 2.5 MILLIGRAM(S): 90 AEROSOL, METERED ORAL at 09:43

## 2018-01-17 RX ADMIN — Medication 250 MILLIGRAM(S): at 02:15

## 2018-01-17 RX ADMIN — DONEPEZIL HYDROCHLORIDE 10 MILLIGRAM(S): 10 TABLET, FILM COATED ORAL at 21:41

## 2018-01-17 RX ADMIN — Medication 3 MILLILITER(S): at 09:48

## 2018-01-17 RX ADMIN — Medication 81 MILLIGRAM(S): at 14:11

## 2018-01-17 RX ADMIN — Medication 10 MILLIGRAM(S): at 17:45

## 2018-01-17 RX ADMIN — AZITHROMYCIN 255 MILLIGRAM(S): 500 TABLET, FILM COATED ORAL at 02:15

## 2018-01-17 RX ADMIN — APIXABAN 2.5 MILLIGRAM(S): 2.5 TABLET, FILM COATED ORAL at 06:49

## 2018-01-17 RX ADMIN — CARVEDILOL PHOSPHATE 3.12 MILLIGRAM(S): 80 CAPSULE, EXTENDED RELEASE ORAL at 06:48

## 2018-01-17 RX ADMIN — Medication 100 MICROGRAM(S): at 06:49

## 2018-01-17 RX ADMIN — Medication 650 MILLIGRAM(S): at 02:15

## 2018-01-17 RX ADMIN — PIPERACILLIN AND TAZOBACTAM 25 GRAM(S): 4; .5 INJECTION, POWDER, LYOPHILIZED, FOR SOLUTION INTRAVENOUS at 17:53

## 2018-01-17 RX ADMIN — ALBUTEROL 2.5 MILLIGRAM(S): 90 AEROSOL, METERED ORAL at 16:33

## 2018-01-17 RX ADMIN — LEVETIRACETAM 250 MILLIGRAM(S): 250 TABLET, FILM COATED ORAL at 06:49

## 2018-01-17 RX ADMIN — Medication 6 MILLILITER(S): at 16:36

## 2018-01-17 RX ADMIN — Medication 50 GRAM(S): at 04:15

## 2018-01-17 RX ADMIN — CARVEDILOL PHOSPHATE 3.12 MILLIGRAM(S): 80 CAPSULE, EXTENDED RELEASE ORAL at 17:45

## 2018-01-17 NOTE — PROGRESS NOTE ADULT - ASSESSMENT
74 year old female patient with UTI/septic shock, AMS, AF w RVR    74 year old female with PMHx of afib , mvp and breast cancer was found to be lethargic by family EMS called who apparently palpated thready pulses and started cpr for few seconds per family when pt woke up. She was transferred by ambulance and came into ER with altered mental status and hypothermic found to be hypotensive . Found to be septic from UTI with shock and AMS, admitted to ICU on levophed. Pt had AF w RVR and received digoxin loading but developed bradycardia. HR currently normal. seen by neurology. suggested to c/w keppra. neurology signed off. Seen at bedside. did not answer question  CXr showed consolidation, right effusion, wbc went up. zosyn was started for possible aspiration PNA. mild temp.   spoke to MS. Andrea Salgado @ [phone]. she had family meeting and family decided to start with PO NOAC. discussed about options with her and risk/benefit proles of different NOACs. she is ok with Eliquis. Family want her to go [facility] after DC. seen by PT today. suggested [facility] vs home/assisst . informed her about CXr findings, NSVT and cardiology sugegsttion. family does not want any cardiac intervention at this time.

## 2018-01-17 NOTE — PROGRESS NOTE ADULT - PROBLEM SELECTOR PLAN 4
-Spoke with daughter Layo by phone at length. I explained the overall poor prognosis given poor nutritional state, debility, poor cognitive state, and now with possible new infectious process. They understand, their brother is a physician in a different state and he visited this weekend. They all met and discussed goals and directives this weekend, they would not want her placed on mechanical ventilation but because she came in to the hospital and needed "CPR" and it saved her, they would want CPR. I explained to the daughter those two things usually go together and it really doesn't make sense to do CPR without intubation, but she then explained the situation when she first came in and how it helped her so they would want CPR but not intubation. overall they want her to go to rehab and see how much she can mobilize.

## 2018-01-17 NOTE — SWALLOW BEDSIDE ASSESSMENT ADULT - COMMENTS
Pt had swallow evaluation on 1/8/18 with an recommendation of puree with nectar liquids (pts premorbid diet)

## 2018-01-17 NOTE — PROGRESS NOTE ADULT - SUBJECTIVE AND OBJECTIVE BOX
OVERNIGHT EVENTS: code sepsis earlier today     Present Symptoms:     Dyspnea: 0 1 2 3   Nausea/Vomiting: Yes No  Anxiety:  Yes No  Depression: Yes No  Fatigue: Yes No  Loss of appetite: Yes No    Pain:             Character-            Duration-            Effect-            Factors-            Frequency-            Location-            Severity-    Review of Systems: Reviewed                     Negative:                     Positive:  Unable to obtain due to poor mentation   All others negative    MEDICATIONS  (STANDING):  acetylcysteine 10% Inhalation 6 milliLiter(s) Inhalation every 8 hours  ALBUTerol    0.083% 2.5 milliGRAM(s) Nebulizer every 8 hours  amLODIPine   Tablet 5 milliGRAM(s) Oral daily  apixaban 2.5 milliGRAM(s) Oral every 12 hours  aspirin  chewable 81 milliGRAM(s) Oral daily  carvedilol 3.125 milliGRAM(s) Oral every 12 hours  donepezil 10 milliGRAM(s) Oral at bedtime  levETIRAcetam 250 milliGRAM(s) Oral two times a day  levothyroxine 100 MICROGram(s) Oral daily  memantine 10 milliGRAM(s) Oral daily  pantoprazole    Tablet 40 milliGRAM(s) Oral before breakfast  piperacillin/tazobactam IVPB. 3.375 Gram(s) IV Intermittent every 8 hours    MEDICATIONS  (PRN):  acetaminophen  Suppository 650 milliGRAM(s) Rectal every 6 hours PRN For Temp greater than 38 C (100.4 F)  hydrALAZINE Injectable 10 milliGRAM(s) IV Push every 2 hours PRN SBP> 160    PHYSICAL EXAM:    Vital Signs Last 24 Hrs  T(C): 37.7 (2018 08:07), Max: 38.3 (2018 00:20)  T(F): 99.9 (2018 08:07), Max: 100.9 (2018 00:20)  HR: 82 (2018 08:05) (70 - 111)  BP: 122/62 (2018 08:05) (112/62 - 169/79)  BP(mean): --  RR: 16 (2018 08:05) (16 - 20)  SpO2: 100% (2018 08:05) (98% - 100%)    General: alert  oriented x ____ lethargic agitated                  cachexia  nonverbal  coma    Karnofsky:  %    HEENT: normal  dry mouth  ET tube/trach    Lungs: comfortable tachypnea/labored breathing  excessive secretions    CV: normal  tachycardia    GI: normal  distended  tender  no BS               PEG/NG/OG tube  constipation  last BM:     : normal  incontinent  oliguria/anuria  huffman    MSK: normal  weakness  edema             ambulatory  bedbound/wheelchair bound    Skin: normal  pressure ulcers- Stage_____  no rash    LABS:                      10.1   13.9  )-----------( 161      ( 2018 06:18 )             30.2         145  |  104  |  16.0  ----------------------------<  100  3.9   |  30.0<H>  |  1.04    Ca    9.0      2018 02:54  Mg     1.7         TPro  6.6  /  Alb  2.8<L>  /  TBili  0.4  /  DBili  x   /  AST  32<H>  /  ALT  19  /  AlkPhos  102      Urinalysis Basic - ( 2018 03:01 )    Color: Yellow / Appearance: Clear / S.010 / pH: x  Gluc: x / Ketone: Negative  / Bili: Negative / Urobili: Negative mg/dL   Blood: x / Protein: 30 mg/dL / Nitrite: Negative   Leuk Esterase: Trace / RBC: 3-5 /HPF / WBC 0-2   Sq Epi: x / Non Sq Epi: Moderate / Bacteria: x      I&O's Summary      RADIOLOGY & ADDITIONAL STUDIES:    ADVANCE DIRECTIVES:   DNR YES NO  Completed on:                     MOLST  YES NO   Completed on:  Living Will  YES NO   Completed on: OVERNIGHT EVENTS: code sepsis earlier today, possible new pneumonia.     Present Symptoms:     Dyspnea: 0   Nausea/Vomiting: No  Anxiety:  No  Depression: No  Fatigue: No  Loss of appetite: No    Pain: none             Character-            Duration-            Effect-            Factors-            Frequency-            Location-            Severity-    Review of Systems: Reviewed                     Negative:                     Positive:  Unable to obtain due to poor mentation   All others negative    MEDICATIONS  (STANDING):  acetylcysteine 10% Inhalation 6 milliLiter(s) Inhalation every 8 hours  ALBUTerol    0.083% 2.5 milliGRAM(s) Nebulizer every 8 hours  amLODIPine   Tablet 5 milliGRAM(s) Oral daily  apixaban 2.5 milliGRAM(s) Oral every 12 hours  aspirin  chewable 81 milliGRAM(s) Oral daily  carvedilol 3.125 milliGRAM(s) Oral every 12 hours  donepezil 10 milliGRAM(s) Oral at bedtime  levETIRAcetam 250 milliGRAM(s) Oral two times a day  levothyroxine 100 MICROGram(s) Oral daily  memantine 10 milliGRAM(s) Oral daily  pantoprazole    Tablet 40 milliGRAM(s) Oral before breakfast  piperacillin/tazobactam IVPB. 3.375 Gram(s) IV Intermittent every 8 hours    MEDICATIONS  (PRN):  acetaminophen  Suppository 650 milliGRAM(s) Rectal every 6 hours PRN For Temp greater than 38 C (100.4 F)  hydrALAZINE Injectable 10 milliGRAM(s) IV Push every 2 hours PRN SBP> 160    PHYSICAL EXAM:    Vital Signs Last 24 Hrs  T(C): 37.7 (2018 08:07), Max: 38.3 (2018 00:20)  T(F): 99.9 (2018 08:07), Max: 100.9 (2018 00:20)  HR: 82 (2018 08:05) (70 - 111)  BP: 122/62 (2018 08:05) (112/62 - 169/79)  BP(mean): --  RR: 16 (2018 08:05) (16 - 20)  SpO2: 100% (2018 08:05) (98% - 100%)    General: alert  oriented x ____ lethargic agitated                  cachexia  nonverbal  coma    Karnofsky:  %    HEENT: normal  dry mouth  ET tube/trach    Lungs: comfortable tachypnea/labored breathing  excessive secretions    CV: normal  tachycardia    GI: normal  distended  tender  no BS               PEG/NG/OG tube  constipation  last BM:     : normal  incontinent  oliguria/anuria  huffman    MSK: normal  weakness  edema             ambulatory  bedbound/wheelchair bound    Skin: normal  pressure ulcers- Stage_____  no rash    LABS:                      10.1   13.9  )-----------( 161      ( 2018 06:18 )             30.2     -    145  |  104  |  16.0  ----------------------------<  100  3.9   |  30.0<H>  |  1.04    Ca    9.0      2018 02:54  Mg     1.7         TPro  6.6  /  Alb  2.8<L>  /  TBili  0.4  /  DBili  x   /  AST  32<H>  /  ALT  19  /  AlkPhos  102      Urinalysis Basic - ( 2018 03:01 )    Color: Yellow / Appearance: Clear / S.010 / pH: x  Gluc: x / Ketone: Negative  / Bili: Negative / Urobili: Negative mg/dL   Blood: x / Protein: 30 mg/dL / Nitrite: Negative   Leuk Esterase: Trace / RBC: 3-5 /HPF / WBC 0-2   Sq Epi: x / Non Sq Epi: Moderate / Bacteria: x      I&O's Summary      RADIOLOGY & ADDITIONAL STUDIES:    ADVANCE DIRECTIVES:   DNR YES NO  Completed on:                     MOLST  YES NO   Completed on:  Living Will  YES NO   Completed on: OVERNIGHT EVENTS: code sepsis earlier today, possible new pneumonia.     Present Symptoms:     Dyspnea: 0   Nausea/Vomiting: No  Anxiety:  No  Depression: No  Fatigue: No  Loss of appetite: No    Pain: none             Character-            Duration-            Effect-            Factors-            Frequency-            Location-            Severity-    Review of Systems: Reviewed                    Unable to obtain due to poor mentation   All others negative    MEDICATIONS  (STANDING):  acetylcysteine 10% Inhalation 6 milliLiter(s) Inhalation every 8 hours  ALBUTerol    0.083% 2.5 milliGRAM(s) Nebulizer every 8 hours  amLODIPine   Tablet 5 milliGRAM(s) Oral daily  apixaban 2.5 milliGRAM(s) Oral every 12 hours  aspirin  chewable 81 milliGRAM(s) Oral daily  carvedilol 3.125 milliGRAM(s) Oral every 12 hours  donepezil 10 milliGRAM(s) Oral at bedtime  levETIRAcetam 250 milliGRAM(s) Oral two times a day  levothyroxine 100 MICROGram(s) Oral daily  memantine 10 milliGRAM(s) Oral daily  pantoprazole    Tablet 40 milliGRAM(s) Oral before breakfast  piperacillin/tazobactam IVPB. 3.375 Gram(s) IV Intermittent every 8 hours    MEDICATIONS  (PRN):  acetaminophen  Suppository 650 milliGRAM(s) Rectal every 6 hours PRN For Temp greater than 38 C (100.4 F)  hydrALAZINE Injectable 10 milliGRAM(s) IV Push every 2 hours PRN SBP> 160    PHYSICAL EXAM:    Vital Signs Last 24 Hrs  T(C): 37.7 (2018 08:07), Max: 38.3 (2018 00:20)  T(F): 99.9 (2018 08:07), Max: 100.9 (2018 00:20)  HR: 82 (2018 08:05) (70 - 111)  BP: 122/62 (2018 08:05) (112/62 - 169/79)  BP(mean): --  RR: 16 (2018 08:05) (16 - 20)  SpO2: 100% (2018 08:05) (98% - 100%)    General: alert  not oriented     Karnofsky:  30 %    HEENT: normal      Lungs: comfortable     CV: normal      GI: normal      : normal      MSK: weakness      Skin: no rash    LABS:                      10.1   13.9  )-----------( 161      ( 2018 06:18 )             30.2     -    145  |  104  |  16.0  ----------------------------<  100  3.9   |  30.0<H>  |  1.04    Ca    9.0      2018 02:54  Mg     1.7         TPro  6.6  /  Alb  2.8<L>  /  TBili  0.4  /  DBili  x   /  AST  32<H>  /  ALT  19  /  AlkPhos  102  -    Urinalysis Basic - ( 2018 03:01 )    Color: Yellow / Appearance: Clear / S.010 / pH: x  Gluc: x / Ketone: Negative  / Bili: Negative / Urobili: Negative mg/dL   Blood: x / Protein: 30 mg/dL / Nitrite: Negative   Leuk Esterase: Trace / RBC: 3-5 /HPF / WBC 0-2   Sq Epi: x / Non Sq Epi: Moderate / Bacteria: x    I&O's Summary    RADIOLOGY & ADDITIONAL STUDIES:    ADVANCE DIRECTIVES: DNI only

## 2018-01-17 NOTE — PROGRESS NOTE ADULT - SUBJECTIVE AND OBJECTIVE BOX
HIPOLITO AGUILAR  ----------------------------------------    CC:  jason septic shock due to UTI, hypothermia,  AMS, advanced dementia,    Seen at bedside. did not answer question  Code sepssis was called last night. repeat cxr possible aspiration PNA vs HCAP. BC and UC sent. UA neg for UTI  CXr showed consolidation, right effusion, wbc went up. zosyn was started for possible aspiration PNA. mild temp.       Adm HPI:  74 year old female with PMHx of afib , mvp and breast cancer was found to be lethargic by family EMS called who apparently palpated thready pulses and started cpr for few seconds per family when pt woke up. She was transferred by ambulance and came into ER with altered mental status and hypothermic found to be hypotensive . Found to be septic from UTI with shock and AMS, admitted to ICU on levophed. Pt had AF w RVR and received digoxin loading but developed bradycardia. HR currently normal. seen by neurology. suggested to c/w keppra. neurology signed off today. dementia w/u OP    ROs:  did not answer qs      PHYSICAL EXAMINATION:  ----------------------------------------  Vital Signs Last 24 Hrs  T(C): 37.7 (17 Jan 2018 08:07), Max: 38.3 (17 Jan 2018 00:20)  T(F): 99.9 (17 Jan 2018 08:07), Max: 100.9 (17 Jan 2018 00:20)  HR: 82 (17 Jan 2018 08:05) (70 - 111)  BP: 122/62 (17 Jan 2018 08:05) (103/67 - 169/79)  BP(mean): --  RR: 16 (17 Jan 2018 08:05) (16 - 20)  SpO2: 100% (17 Jan 2018 08:05) (98% - 100%).    General appearance: NAD, Awake, Alert.  not cooperative. did not answer qs today.   HEENT: NCAT,   Neck: Supple, No JVD  Lungs: Decreased breath sounds bilaterally. ?rales. limited as was not taking deep breadth  Cardiovascular: S1S2, Irregularly irregular. SM+  Abdomen: Soft, Nontender,   Extremities: No clubbing, No cyanosis, No edema. no calf tenderness  CNS: alert, minimally verbal. moves limbs. withdraw after painful stimuli. resist movements      LABORATORY STUDIES:  ----------------------------------------                          10.5   13.9  )-----------( 178      ( 17 Jan 2018 02:54 )             32.5       01-17    145  |  104  |  16.0  ----------------------------<  100  3.9   |  30.0<H>  |  1.04    Ca    9.0      17 Jan 2018 02:54  Mg     1.7     01-17    TPro  6.6  /  Alb  2.8<L>  /  TBili  0.4  /  DBili  x   /  AST  32<H>  /  ALT  19  /  AlkPhos  102  01-16    ABG - ( 17 Jan 2018 01:33 )  pH: 7.54  /  pCO2: 38    /  pO2: 56    / HCO3: 33    / Base Excess: 8.9   /  SaO2: 93            < from: Xray Chest 1 View AP/PA. (01.17.18 @ 01:33) >    IMPRESSION:    Support Devices: None  Heart:  Unremarkable  Mediastinum:  Unremarkable  Lungs/Airways: Hazy bilateral airspace opacities  Bones/Soft tissues: Unremarkable    < end of copied text >    < from: Xray Chest 1 View AP- PORTABLE-Urgent (01.15.18 @ 09:33) >    IMPRESSION:   Left lower lobe airspace consolidation and/or effusion..        < end of copied text >

## 2018-01-17 NOTE — SWALLOW BEDSIDE ASSESSMENT ADULT - SWALLOW EVAL: DIAGNOSIS
Oral and pharyngeal stage of swallow judged to be WFL for puree with honey. Suspect pharyngeal stage of dysphagia for nectar liquids , delayed swallow reflex for nectar consistencies

## 2018-01-17 NOTE — PROGRESS NOTE ADULT - PROBLEM SELECTOR PLAN 2
now in sinus  CHADs 2VASC score high cardio rec full AC, pt is fall risk seizure and dementia   continue coreg for rate control  on eliquis.  cardiology signed off. suggested no active intervention. family agreed

## 2018-01-17 NOTE — SWALLOW BEDSIDE ASSESSMENT ADULT - SWALLOW EVAL: RECOMMENDED FEEDING/EATING TECHNIQUES
position upright (90 degrees)/maintain upright posture during/after eating for 30 mins/no straws/crush medication (when feasible)/small sips/bites
allow for swallow between intakes/position upright (90 degrees)/small sips/bites/crush medication (when feasible)/maintain upright posture during/after eating for 30 mins/oral hygiene

## 2018-01-17 NOTE — PROGRESS NOTE ADULT - PROBLEM SELECTOR PLAN 1
possible aspiration PNA. on zosyn. hypoxemia. possibly related to PNA. will not do CTA. on Eliquis. will not change rx. wbc trending down. UA neg for UTI. ID cx called today

## 2018-01-17 NOTE — CHART NOTE - NSCHARTNOTEFT_GEN_A_CORE
Source: Patient [ ]  Family [ ]   other [x ]EMR, Pt sleeping    Current Diet: NPO, possible aspiration as per nsg    Patient reports [ ] nausea  [ ] vomiting [ ] diarrhea [ ] constipation  [ ]chewing problems [ ] swallowing issues  [ ] other:     PO intake:  < 50% [x ]   50-75%  [ ]   %  [ ]  other :as per EMR a few good meals noted    Source for PO intake [ ] Patient [ ] family [x ] chart [ ] staff [ ] other    Enteral /Parenteral Nutrition:     Current Weight:     % Weight Change     Pertinent Medications: MEDICATIONS  (STANDING):  acetylcysteine 10% Inhalation 6 milliLiter(s) Inhalation every 8 hours  ALBUTerol    0.083% 2.5 milliGRAM(s) Nebulizer every 8 hours  amLODIPine   Tablet 5 milliGRAM(s) Oral daily  apixaban 2.5 milliGRAM(s) Oral every 12 hours  aspirin  chewable 81 milliGRAM(s) Oral daily  carvedilol 3.125 milliGRAM(s) Oral every 12 hours  donepezil 10 milliGRAM(s) Oral at bedtime  levETIRAcetam 250 milliGRAM(s) Oral two times a day  levothyroxine 100 MICROGram(s) Oral daily  memantine 10 milliGRAM(s) Oral daily  pantoprazole    Tablet 40 milliGRAM(s) Oral before breakfast  piperacillin/tazobactam IVPB. 3.375 Gram(s) IV Intermittent every 8 hours    MEDICATIONS  (PRN):  acetaminophen  Suppository 650 milliGRAM(s) Rectal every 6 hours PRN For Temp greater than 38 C (100.4 F)  hydrALAZINE Injectable 10 milliGRAM(s) IV Push every 2 hours PRN SBP> 160    Pertinent Labs: CBC Full  -  ( 17 Jan 2018 06:18 )  WBC Count : 13.9 K/uL  Hemoglobin : 10.1 g/dL  Hematocrit : 30.2 %  Platelet Count - Automated : 161 K/uL  Mean Cell Volume : 93.8 fl  Mean Cell Hemoglobin : 31.4 pg  Mean Cell Hemoglobin Concentration : 33.4 g/dL  Auto Neutrophil # : 9.8 K/uL  Auto Lymphocyte # : 0.4 K/uL  Auto Monocyte # : 1.2 K/uL  Auto Eosinophil # : 0.1 K/uL  Auto Basophil # : 0.0 K/uL  Auto Neutrophil % : 84.0 %  Auto Lymphocyte % : 3.8 %  Auto Monocyte % : 10.5 %  Auto Eosinophil % : 0.5 %  Auto Basophil % : 0.3 %      01-17 Na145 mmol/L Glu 100 mg/dL K+ 3.9 mmol/L Cr  1.04 mg/dL BUN 16.0 mg/dL Phos n/a   Alb n/a   PAB n/a           Skin:     Nutrition focused physical exam conducted - found signs of malnutrition [ ]absent [x ]present    Subcutaneous fat loss: [x ] Orbital fat pads region, [ ]Buccal fat region, [ ]Triceps region,  [ ]Ribs region    Muscle wasting: [x ]Temples region, [x ]Clavicle region, [ ]Shoulder region, [ ]Scapula region, [ ]Interosseous region,  [ ]thigh region, [ ]Calf region    Estimated Needs:   [x ] no change since previous assessment  [ ] recalculated:     Current Nutrition Diagnosis: Pt presents with moderate protein calorie malnutrition, related to insufficient PO intake, as evidenced by po< 25% x 7 days  and physical signs of muscle and fat loss ( temples, orbital, clavicles). Pt currently NPO. Possible aspiration      Recommendations: Advance diet as ordered and medically feasible. Provide supplements.    Monitoring and Evaluation:   [x ] PO intake [x ] Tolerance to diet prescription [X] Weights  [X] Follow up per protocol [X] Labs:

## 2018-01-17 NOTE — SWALLOW BEDSIDE ASSESSMENT ADULT - SLP PERTINENT HISTORY OF CURRENT PROBLEM
Code sepsis 1/16/18 repeat cxr possible aspiration PNA vs HCAP. BC and UC sent. UA neg for UTI. Now NPO

## 2018-01-17 NOTE — PROGRESS NOTE ADULT - ATTENDING COMMENTS
Thank you for the opportunity to assist with the care of this patient.   [place] [facility] [facility] [phone]. had been walking since October     Thank you for the opportunity to assist with the care of this patient.   [place] [facility] [phone]. had been walking before October     Thank you for the opportunity to assist with the care of this patient.   [place] [facility] [phone].

## 2018-01-17 NOTE — CONSULT NOTE ADULT - PROBLEM SELECTOR RECOMMENDATION 9
- check straight cath urine culture  - follow up blood cultures  - check CT chest non- contrast to evaluate for pneumonia
-need to speak to family to get a better idea of baseline.

## 2018-01-17 NOTE — PROGRESS NOTE ADULT - PROBLEM SELECTOR PLAN 5
Palliative care consulted to discuss the over all Goals of care  patient was living with family. family will let us know about alternative care. needs palliative care follow up

## 2018-01-17 NOTE — CHART NOTE - NSCHARTNOTEFT_GEN_A_CORE
Rapid Response PGY 2/ PGY 3 Note  CC/ HPI Patient is a 84y old  Female who presents with a chief complaint of 84y    rapid response cold sepsis called for temp of 100.6   , Hr of 115    , /70    , RR   16  WBC is 22  mental status is altered  at baseline  Pt has source of infection (UTI & pna )    Allergies    latex (Unknown)  No Known Drug Allergies  shellfish (Hives; Rash)  shellfish (Other)    Intolerances        PAST MEDICAL & SURGICAL HISTORY:  Afib  Breast cancer  Mitral valve prolapse  HTN (hypertension)  Dementia  Mitral Valve Prolapse  Depression  CVA (Cerebral Vascular Accident) (ICD9 434.91)  CAD (Coronary Artery Disease) (ICD9 414.00): with STENTS in  [facility] last CATH  in 10/2009 with 90% MID LAD on Medical managemnt  Dementia (ICD9 294.8)  Anxiety (ICD9 300.00)  GERD (Gastroesophageal Reflux Disease) (ICD9 530.81)  Atrial Fibrillation (ICD9 427.31)  Afib (ICD9 427.31)  Hypothyroidism (ICD9 244.9)  Hx of Breast Cancer (ICD9 V10.3)  Hypercholesterolemia (ICD9 272.0)  HTN  Transient Ischemic Attack (TIA) (ICD9 435.9)  No significant past surgical history  No significant past surgical history  S/P Breast Lumpectomy (ICD9 V45.89)   Section (ICD9 669.70)  After Cataract, Bilateral (ICD9 366.50)      Vital Signs Last 24 Hrs  T(C): 38.3 (2018 00:20), Max: 38.3 (2018 00:20)  T(F): 100.9 (2018 00:20), Max: 100.9 (2018 00:20)  HR: 108 (2018 00:20) (70 - 108)  BP: 155/75 (2018 00:20) (103/67 - 155/75)  BP(mean): --  RR: 20 (2018 00:20) (18 - 20)  SpO2: 100% (2018 00:20) (97% - 100%)      GENERAL: The patient is awake and alert in no apparent distress.  HEENT: Head is normocephalic and atraumatic. Extraocular muscles are intact. Mucous membranes are moist. no JVD, airway patent  NECK: Supple.  LUNGS: + good air exchange, bilateral without wheezing, rales or rhonchi; respirations unlabored  HEART: Regular rate and rhythm ,+S1/+S2, no murmurs, rubs, gallops  ABDOMEN: Soft, nontender, and nondistended, no rebound, guarding rigidity, bowel sounds in all 4 quadrants  EXTREMITIES: Without any cyanosis, clubbing, rash, lesions or edema, 2+capillary refills  SKIN: No new rashes or lesions.  MSK: strength equal BL  VASCULAR: Radial and Dorsal pedal pulses palpable BL  NEUROLOGIC: Grossly intact.  PSYCH: No new changes.                            11.7   22.1  )-----------( 182      ( 2018 05:54 )             36.2     -16    145  |  101  |  15.0  ----------------------------<  92  3.1<L>   |  31.0<H>  |  0.79    Ca    8.8      2018 05:54  Mg     1.7         TPro  6.6  /  Alb  2.8<L>  /  TBili  0.4  /  DBili  x   /  AST  32<H>  /  ALT  19  /  AlkPhos  102  -    ABG - ( 2018 01:33 )  pH: 7.54  /  pCO2: 38    /  pO2: 56    / HCO3: 33    / Base Excess: 8.9   /  SaO2: 93                   LIVER FUNCTIONS - ( 2018 05:54 )  Alb: 2.8 g/dL / Pro: 6.6 g/dL / ALK PHOS: 102 U/L / ALT: 19 U/L / AST: 32 U/L / GGT: x                  Vital Signs Last 24 Hrs*       Assessment- Rapid Response called for 84y year old Female with a past medical history of       Plan-  stat lactate and cultures (blood x 2, urine)      ml bolus ivfs at 30cc/kg will adjust pending lactate results  ekg  xray   procalcitonin, cbc, cmp,   Tylenol 1G IV 650po stat  ABX- amp 2g stat and q8hr, gent. 80mg q8hr  ABX- vancomycin 1g, zosyn 3.375g q8hr  ABX- Rocephin 1g daily  will reasses pt in 3 and 6 hours  case discussed with PC who agrees aith plan Rapid Response PGY 2/ PGY 3 Note  CC/ HPI Patient is a 84y old  Female who presents with a chief complaint of 84y    rapid response cold sepsis called for temp of 100.6   , Hr of 115    , /70    , RR   16  WBC is 22  mental status is altered  at baseline  Pt has source of infection (UTI & pna )    Allergies    latex (Unknown)  No Known Drug Allergies  shellfish (Hives; Rash)  shellfish (Other)    Intolerances        PAST MEDICAL & SURGICAL HISTORY:  Afib  Breast cancer  Mitral valve prolapse  HTN (hypertension)  Dementia  Mitral Valve Prolapse  Depression  CVA (Cerebral Vascular Accident) (ICD9 434.91)  CAD (Coronary Artery Disease) (ICD9 414.00): with STENTS in  [facility] last CATH  in 10/2009 with 90% MID LAD on Medical managemnt  Dementia (ICD9 294.8)  Anxiety (ICD9 300.00)  GERD (Gastroesophageal Reflux Disease) (ICD9 530.81)  Atrial Fibrillation (ICD9 427.31)  Afib (ICD9 427.31)  Hypothyroidism (ICD9 244.9)  Hx of Breast Cancer (ICD9 V10.3)  Hypercholesterolemia (ICD9 272.0)  HTN  Transient Ischemic Attack (TIA) (ICD9 435.9)  No significant past surgical history  No significant past surgical history  S/P Breast Lumpectomy (ICD9 V45.89)   Section (ICD9 669.70)  After Cataract, Bilateral (ICD9 366.50)      Vital Signs Last 24 Hrs  T(C): 38.3 (2018 00:20), Max: 38.3 (2018 00:20)  T(F): 100.9 (2018 00:20), Max: 100.9 (2018 00:20)  HR: 108 (2018 00:20) (70 - 108)  BP: 155/75 (2018 00:20) (103/67 - 155/75)  BP(mean): --  RR: 20 (2018 00:20) (18 - 20)  SpO2: 100% (2018 00:20) (97% - 100%)      GENERAL: The patient is awake and alert in no apparent distress.  HEENT: Head is normocephalic and atraumatic. Extraocular muscles are intact. Mucous membranes are moist. no JVD, airway patent  NECK: Supple.  LUNGS: + good air exchange, bilateral without wheezing, rales or rhonchi; respirations unlabored  HEART: Regular rate and rhythm ,+S1/+S2, no murmurs, rubs, gallops  ABDOMEN: Soft, nontender, and nondistended, no rebound, guarding rigidity, bowel sounds in all 4 quadrants  EXTREMITIES: Without any cyanosis, clubbing, rash, lesions or edema, 2+capillary refills  SKIN: No new rashes or lesions.  NEUROLOGIC: awake and alert, opens eyes, baseline dementia as per RN, nonverbal doesnot follow commands                              11.7   22.1  )-----------( 182      ( 2018 05:54 )             36.2     01-    145  |  101  |  15.0  ----------------------------<  92  3.1<L>   |  31.0<H>  |  0.79    Ca    8.8      2018 05:54  Mg     1.7     -    TPro  6.6  /  Alb  2.8<L>  /  TBili  0.4  /  DBili  x   /  AST  32<H>  /  ALT  19  /  AlkPhos  102  -    ABG - ( 2018 01:33 )  pH: 7.54  /  pCO2: 38    /  pO2: 56    / HCO3: 33    / Base Excess: 8.9   /  SaO2: 93                   LIVER FUNCTIONS - ( 2018 05:54 )  Alb: 2.8 g/dL / Pro: 6.6 g/dL / ALK PHOS: 102 U/L / ALT: 19 U/L / AST: 32 U/L / GGT: x            Assessment- Rapid Response called for 84y year old Female with code sepsis called patient has UTI&pna, now with new cough, fever and elevated wbc 22.  Plan-  stat lactate and cultures (blood x 2, urine)      no IV fluids given as BP was elevate 150/70's, and lactate wasw 1.2  xray done some right upper  and lower lobe stranding noted slight change from prior,possible worsening pneumonia for HCAP vs aspiration as RN stated patient has new cough. EICU cart was used advised against fluid advised to wait for lactate which was normal so fluids were not warrented. IV abx patient was on zosyn but now abx was discussed w EICU  to  widen and increase broad spectrum scope of abx so IV Vanco and azithromycin was given   cbc, cmp, stat  Tylenol 650 rectal stat  ABX- vancomycin 1g & Azithromycin IV  case discussed with hospitalist on call and agrees w plan Rapid Response PGY 2/ PGY 3 Note  CC/  84y old  rapid response code sepsis called for temp of 100.6   , Hr of 115    , /70    , RR   16  WBC is 22  mental status is altered  at baseline  Pt has source of infection (UTI & pna )    Allergies    latex (Unknown)  No Known Drug Allergies  shellfish (Hives; Rash)  shellfish (Other)    Intolerances        PAST MEDICAL & SURGICAL HISTORY:  Afib  Breast cancer  Mitral valve prolapse  HTN (hypertension)  Dementia  Mitral Valve Prolapse  Depression  CVA (Cerebral Vascular Accident) (ICD9 434.91)  CAD (Coronary Artery Disease) (ICD9 414.00): with STENTS in  [facility] last CATH  in 10/2009 with 90% MID LAD on Medical managemnt  Dementia (ICD9 294.8)  Anxiety (ICD9 300.00)  GERD (Gastroesophageal Reflux Disease) (ICD9 530.81)  Atrial Fibrillation (ICD9 427.31)  Afib (ICD9 427.31)  Hypothyroidism (ICD9 244.9)  Hx of Breast Cancer (ICD9 V10.3)  Hypercholesterolemia (ICD9 272.0)   HTN  Transient Ischemic Attack (TIA) (ICD9 435.9)  No significant past surgical history  No significant past surgical history  S/P Breast Lumpectomy (ICD9 V45.89)   Section (ICD9 669.70)  After Cataract, Bilateral (ICD9 366.50)      Vital Signs Last 24 Hrs  T(C): 38.3 (2018 00:20), Max: 38.3 (2018 00:20)  T(F): 100.9 (2018 00:20), Max: 100.9 (2018 00:20)  HR: 108 (2018 00:20) (70 - 108)  BP: 155/75 (2018 00:20) (103/67 - 155/75)  BP(mean): --  RR: 20 (2018 00:20) (18 - 20)  SpO2: 100% (2018 00:20) (97% - 100%)      GENERAL: The patient is awake and alert in no apparent distress.  HEENT: Head is normocephalic and atraumatic. Extraocular muscles are intact. Mucous membranes are moist. no JVD, airway patent  NECK: Supple.  LUNGS: + good air exchange, bilateral without wheezing, rales or rhonchi; respirations unlabored  HEART: Regular rate and rhythm ,+S1/+S2, no murmurs, rubs, gallops  ABDOMEN: Soft, nontender, and nondistended, no rebound, guarding rigidity, bowel sounds in all 4 quadrants  EXTREMITIES: Without any cyanosis, clubbing, rash, lesions or edema, 2+capillary refills  SKIN: No new rashes or lesions.  NEUROLOGIC: awake and alert, opens eyes, baseline dementia as per RN, nonverbal doesnot follow commands                              11.7   22.1  )-----------( 182      ( 2018 05:54 )             36.2     -    145  |  101  |  15.0  ----------------------------<  92  3.1<L>   |  31.0<H>  |  0.79    Ca    8.8      2018 05:54  Mg     1.7     -    TPro  6.6  /  Alb  2.8<L>  /  TBili  0.4  /  DBili  x   /  AST  32<H>  /  ALT  19  /  AlkPhos  102  -    ABG - ( 2018 01:33 )  pH: 7.54  /  pCO2: 38    /  pO2: 56    / HCO3: 33    / Base Excess: 8.9   /  SaO2: 93                   LIVER FUNCTIONS - ( 2018 05:54 )  Alb: 2.8 g/dL / Pro: 6.6 g/dL / ALK PHOS: 102 U/L / ALT: 19 U/L / AST: 32 U/L / GGT: x            Assessment- Rapid Response called for 84y year old Female with code sepsis called patient has UTI&pna, now with new cough, fever and elevated wbc 22.  Plan-  stat lactate and cultures (blood x 2, urine)      no IV fluids given as BP was elevate 150/70's, and lactate wasw 1.2  xray done some right upper  and lower lobe stranding noted slight change from prior,possible worsening pneumonia for HCAP vs aspiration as RN stated patient has new cough. EICU cart was used advised against fluid advised to wait for lactate which was normal so fluids were not warrented. IV abx patient was on zosyn but now abx was discussed w EICU  to  widen and increase broad spectrum scope of abx so IV Vanco and azithromycin was given   cbc, cmp, stat  Tylenol 650 rectal stat  ABX- vancomycin 1g & Azithromycin IV  case discussed with hospitalist on call and agrees w plan Rapid Response PGY 2/ PGY 3 Note  CC/  84y old  rapid response code sepsis called for temp of 100.6   , Hr of 115    , /70    , RR   16  WBC is 22  mental status is altered  at baseline  Pt has source of infection (UTI & pna )    Allergies    latex (Unknown)  No Known Drug Allergies  shellfish (Hives; Rash)  shellfish (Other)    Intolerances        PAST MEDICAL & SURGICAL HISTORY:  Afib  Breast cancer  Mitral valve prolapse  HTN (hypertension)  Dementia  Mitral Valve Prolapse  Depression  CVA (Cerebral Vascular Accident) (ICD9 434.91)  CAD (Coronary Artery Disease) (ICD9 414.00): with STENTS in  [facility] last CATH  in 10/2009 with 90% MID LAD on Medical managemnt  Dementia (ICD9 294.8)  Anxiety (ICD9 300.00)  GERD (Gastroesophageal Reflux Disease) (ICD9 530.81)  Atrial Fibrillation (ICD9 427.31)  Afib (ICD9 427.31)  Hypothyroidism (ICD9 244.9)  Hx of Breast Cancer (ICD9 V10.3)  Hypercholesterolemia (ICD9 272.0)   HTN  Transient Ischemic Attack (TIA) (ICD9 435.9)  No significant past surgical history  No significant past surgical history  S/P Breast Lumpectomy (ICD9 V45.89)   Section (ICD9 669.70)  After Cataract, Bilateral (ICD9 366.50)      Vital Signs Last 24 Hrs  T(C): 38.3 (2018 00:20), Max: 38.3 (2018 00:20)  T(F): 100.9 (2018 00:20), Max: 100.9 (2018 00:20)  HR: 108 (2018 00:20) (70 - 108)  BP: 155/75 (2018 00:20) (103/67 - 155/75)  BP(mean): --  RR: 20 (2018 00:20) (18 - 20)  SpO2: 100% (2018 00:20) (97% - 100%)      GENERAL: The patient is awake and alert in no apparent distress.  HEENT: Head is normocephalic and atraumatic. Extraocular muscles are intact. Mucous membranes are moist. no JVD, airway patent  NECK: Supple.  LUNGS: + good air exchange, bilateral without wheezing, rales or rhonchi; respirations unlabored  HEART: Regular rate and rhythm ,+S1/+S2, no murmurs, rubs, gallops  ABDOMEN: Soft, nontender, and nondistended, no rebound, guarding rigidity, bowel sounds in all 4 quadrants  EXTREMITIES: Without any cyanosis, clubbing, rash, lesions or edema, 2+capillary refills  SKIN: No new rashes or lesions.  NEUROLOGIC: awake and alert, opens eyes, baseline dementia as per RN, nonverbal doesnot follow commands                              11.7   22.1  )-----------( 182      ( 2018 05:54 )             36.2                         10.5   13.9  )-----------( 178      ( 2018 02:54 )             32.5       01-16    145  |  101  |  15.0  ----------------------------<  92  3.1<L>   |  31.0<H>  |  0.79    Ca    8.8      2018 05:54  Mg     1.7         TPro  6.6  /  Alb  2.8<L>  /  TBili  0.4  /  DBili  x   /  AST  32<H>  /  ALT  19  /  AlkPhos  102  -    ABG - ( 2018 01:33 )  pH: 7.54  /  pCO2: 38    /  pO2: 56    / HCO3: 33    / Base Excess: 8.9   /  SaO2: 93                   LIVER FUNCTIONS - ( 2018 05:54 )  Alb: 2.8 g/dL / Pro: 6.6 g/dL / ALK PHOS: 102 U/L / ALT: 19 U/L / AST: 32 U/L / GGT: x            Assessment- Rapid Response called for 84y year old Female with code sepsis called patient has UTI&pna, now with new cough, fever and elevated wbc 22.  Plan-  stat lactate and cultures (blood x 2, urine)      no IV fluids given as BP was elevate 150/70's, and lactate wasw 1.2  xray done some right upper  and lower lobe stranding noted slight change from prior, possible worsening pneumonia for HCAP vs aspiration as RN stated patient has new cough. EICU cart was used advised against fluid advised to wait for lactate which was normal so fluids were not warranted. IV abx patient was on zosyn but now abx was discussed w EICU  to  widen and increase broad spectrum scope of abx so IV Vanco and azithromycin was given x1.   cbc, cmp, stat  Tylenol 650 rectal stat  ABX- vancomycin 1g & Azithromycin IV x1 dose  case discussed with hospitalist on call and agrees w plan and to make patient NPO for now.

## 2018-01-17 NOTE — SWALLOW BEDSIDE ASSESSMENT ADULT - ASR SWALLOW ASPIRATION MONITOR
cough/change of breathing pattern/throat clearing/fever/gurgly voice/pneumonia/upper respiratory infection

## 2018-01-17 NOTE — PROGRESS NOTE ADULT - PROBLEM SELECTOR PLAN 3
on tele monitor. no further episode last 24 hrs. c/w coreg. normal electrolytes. possible Ischaemic etiology. no intervention planned as per cardio. cardiology f/u. c/w tele monitor. as above

## 2018-01-17 NOTE — CONSULT NOTE ADULT - PROBLEM SELECTOR RECOMMENDATION 4
continue supportive care
-will reach out to family to schedule a family meeting to discuss long term planning.

## 2018-01-17 NOTE — CONSULT NOTE ADULT - ASSESSMENT
this 84 y.o. woman, here for lethargy, found with possible consolidation on lung imaging, treated with cefepime, then developed recurrent fever.  urine cx showed corynebacterium, which is a contaminant and UA has many epithelial cells.    Recurrent fever concerning for aspiration PNA.

## 2018-01-18 LAB
BASOPHILS # BLD AUTO: 0 K/UL — SIGNIFICANT CHANGE UP (ref 0–0.2)
BASOPHILS NFR BLD AUTO: 0.1 % — SIGNIFICANT CHANGE UP (ref 0–2)
CULTURE RESULTS: NO GROWTH — SIGNIFICANT CHANGE UP
EOSINOPHIL # BLD AUTO: 0.1 K/UL — SIGNIFICANT CHANGE UP (ref 0–0.5)
EOSINOPHIL NFR BLD AUTO: 1.8 % — SIGNIFICANT CHANGE UP (ref 0–6)
GLUCOSE BLDC GLUCOMTR-MCNC: 137 MG/DL — HIGH (ref 70–99)
GLUCOSE BLDC GLUCOMTR-MCNC: 88 MG/DL — SIGNIFICANT CHANGE UP (ref 70–99)
GLUCOSE BLDC GLUCOMTR-MCNC: 89 MG/DL — SIGNIFICANT CHANGE UP (ref 70–99)
GLUCOSE BLDC GLUCOMTR-MCNC: 97 MG/DL — SIGNIFICANT CHANGE UP (ref 70–99)
GLUCOSE BLDC GLUCOMTR-MCNC: 98 MG/DL — SIGNIFICANT CHANGE UP (ref 70–99)
HCT VFR BLD CALC: 35.3 % — LOW (ref 37–47)
HGB BLD-MCNC: 11.4 G/DL — LOW (ref 12–16)
LYMPHOCYTES # BLD AUTO: 0.9 K/UL — LOW (ref 1–4.8)
LYMPHOCYTES # BLD AUTO: 11.8 % — LOW (ref 20–55)
MCHC RBC-ENTMCNC: 30.3 PG — SIGNIFICANT CHANGE UP (ref 27–31)
MCHC RBC-ENTMCNC: 32.3 G/DL — SIGNIFICANT CHANGE UP (ref 32–36)
MCV RBC AUTO: 93.9 FL — SIGNIFICANT CHANGE UP (ref 81–99)
MONOCYTES # BLD AUTO: 0.9 K/UL — HIGH (ref 0–0.8)
MONOCYTES NFR BLD AUTO: 11.6 % — HIGH (ref 3–10)
NEUTROPHILS # BLD AUTO: 5.8 K/UL — SIGNIFICANT CHANGE UP (ref 1.8–8)
NEUTROPHILS NFR BLD AUTO: 74.3 % — HIGH (ref 37–73)
PLATELET # BLD AUTO: 192 K/UL — SIGNIFICANT CHANGE UP (ref 150–400)
RBC # BLD: 3.76 M/UL — LOW (ref 4.4–5.2)
RBC # FLD: 16.8 % — HIGH (ref 11–15.6)
SPECIMEN SOURCE: SIGNIFICANT CHANGE UP
WBC # BLD: 7.9 K/UL — SIGNIFICANT CHANGE UP (ref 4.8–10.8)
WBC # FLD AUTO: 7.9 K/UL — SIGNIFICANT CHANGE UP (ref 4.8–10.8)

## 2018-01-18 PROCEDURE — 99233 SBSQ HOSP IP/OBS HIGH 50: CPT

## 2018-01-18 PROCEDURE — 74230 X-RAY XM SWLNG FUNCJ C+: CPT | Mod: 26

## 2018-01-18 RX ORDER — ACETYLCYSTEINE 200 MG/ML
3 VIAL (ML) MISCELLANEOUS EVERY 8 HOURS
Qty: 0 | Refills: 0 | Status: DISCONTINUED | OUTPATIENT
Start: 2018-01-18 | End: 2018-01-20

## 2018-01-18 RX ORDER — AMLODIPINE BESYLATE 2.5 MG/1
5 TABLET ORAL DAILY
Qty: 0 | Refills: 0 | Status: DISCONTINUED | OUTPATIENT
Start: 2018-01-19 | End: 2018-01-19

## 2018-01-18 RX ORDER — CARVEDILOL PHOSPHATE 80 MG/1
6.25 CAPSULE, EXTENDED RELEASE ORAL EVERY 12 HOURS
Qty: 0 | Refills: 0 | Status: DISCONTINUED | OUTPATIENT
Start: 2018-01-18 | End: 2018-01-22

## 2018-01-18 RX ORDER — AMLODIPINE BESYLATE 2.5 MG/1
5 TABLET ORAL ONCE
Qty: 0 | Refills: 0 | Status: DISCONTINUED | OUTPATIENT
Start: 2018-01-18 | End: 2018-01-22

## 2018-01-18 RX ADMIN — Medication 100 MICROGRAM(S): at 06:46

## 2018-01-18 RX ADMIN — CARVEDILOL PHOSPHATE 3.12 MILLIGRAM(S): 80 CAPSULE, EXTENDED RELEASE ORAL at 06:46

## 2018-01-18 RX ADMIN — LEVETIRACETAM 250 MILLIGRAM(S): 250 TABLET, FILM COATED ORAL at 06:46

## 2018-01-18 RX ADMIN — PANTOPRAZOLE SODIUM 40 MILLIGRAM(S): 20 TABLET, DELAYED RELEASE ORAL at 06:45

## 2018-01-18 RX ADMIN — PIPERACILLIN AND TAZOBACTAM 25 GRAM(S): 4; .5 INJECTION, POWDER, LYOPHILIZED, FOR SOLUTION INTRAVENOUS at 12:51

## 2018-01-18 RX ADMIN — CARVEDILOL PHOSPHATE 6.25 MILLIGRAM(S): 80 CAPSULE, EXTENDED RELEASE ORAL at 17:48

## 2018-01-18 RX ADMIN — PIPERACILLIN AND TAZOBACTAM 25 GRAM(S): 4; .5 INJECTION, POWDER, LYOPHILIZED, FOR SOLUTION INTRAVENOUS at 01:46

## 2018-01-18 RX ADMIN — APIXABAN 2.5 MILLIGRAM(S): 2.5 TABLET, FILM COATED ORAL at 17:48

## 2018-01-18 RX ADMIN — PIPERACILLIN AND TAZOBACTAM 25 GRAM(S): 4; .5 INJECTION, POWDER, LYOPHILIZED, FOR SOLUTION INTRAVENOUS at 20:15

## 2018-01-18 RX ADMIN — APIXABAN 2.5 MILLIGRAM(S): 2.5 TABLET, FILM COATED ORAL at 06:46

## 2018-01-18 RX ADMIN — LEVETIRACETAM 250 MILLIGRAM(S): 250 TABLET, FILM COATED ORAL at 17:48

## 2018-01-18 RX ADMIN — AMLODIPINE BESYLATE 5 MILLIGRAM(S): 2.5 TABLET ORAL at 06:45

## 2018-01-18 RX ADMIN — Medication 6 MILLILITER(S): at 00:54

## 2018-01-18 RX ADMIN — ALBUTEROL 2.5 MILLIGRAM(S): 90 AEROSOL, METERED ORAL at 00:51

## 2018-01-18 NOTE — SWALLOW VFSS/MBS ASSESSMENT ADULT - RECOMMENDED FEEDING/EATING TECHNIQUES
allow for swallow between intakes/small sips/bites/check mouth frequently for oral residue/pocketing/position upright (90 degrees)/crush medication (when feasible)/maintain upright posture during/after eating for 30 mins/no straws/oral hygiene

## 2018-01-18 NOTE — PROGRESS NOTE ADULT - ASSESSMENT
74 year old female patient with UTI/septic shock, AMS, AF w RVR    74 year old female with PMHx of afib , mvp and breast cancer was found to be lethargic by family EMS called who apparently palpated thready pulses and started cpr for few seconds per family when pt woke up. She was transferred by ambulance and came into ER with altered mental status and hypothermic found to be hypotensive . Found to be septic from UTI with shock and AMS, admitted to ICU on levophed. Pt had AF w RVR and received digoxin loading but developed bradycardia. HR currently normal. seen by neurology. suggested to c/w keppra. neurology signed off. Seen at bedside. did not answer question  CXr showed consolidation, right effusion, wbc went up. zosyn was started for possible aspiration PNA. mild temp.   spoke to MS. Andrea Salgado @ [phone]. she had family meeting and family decided to start with PO NOAC. discussed about options with her and risk/benefit proles of different NOACs. she is ok with Eliquis. Family want her to go [facility] after DC. seen by PT today. suggested KATHY vs home/assisst . informed her about CXr findings, NSVT and cardiology sugegsttion. family does not want any cardiac intervention at this time.  family wants CPR only. no DNI

## 2018-01-18 NOTE — PROGRESS NOTE ADULT - SUBJECTIVE AND OBJECTIVE BOX
HIPOLITO AGUILAR  ----------------------------------------    CC:  jason septic shock due to UTI, hypothermia,  AMS, advanced dementia,    Seen at bedside. more communicative today. however answering selectively.  denied chest pain/SOB/palpitation. cough+.   family wants DNI/ but wants CPR    Tele: tachycardia in am*2 138 bpm.     Adm HPI:  74 year old female with PMHx of afib , mvp and breast cancer was found to be lethargic by family EMS called who apparently palpated thready pulses and started cpr for few seconds per family when pt woke up. She was transferred by ambulance and came into ER with altered mental status and hypothermic found to be hypotensive . Found to be septic from UTI with shock and AMS, admitted to ICU on levophed. Pt had AF w RVR and received digoxin loading but developed bradycardia. HR currently normal. seen by neurology. suggested to c/w keppra. neurology signed off today. dementia w/u OP    ROs:  did not answer qs          PHYSICAL EXAMINATION:  ----------------------------------------  Vital Signs Last 24 Hrs  T(C): 37.3 (18 Jan 2018 06:39), Max: 37.7 (17 Jan 2018 13:02)  T(F): 99.1 (18 Jan 2018 06:39), Max: 99.8 (17 Jan 2018 13:02)  HR: 99 (18 Jan 2018 06:39) (60 - 99)  BP: 178/82 (18 Jan 2018 06:39) (134/72 - 178/82)  BP(mean): --  RR: 20 (18 Jan 2018 06:39) (18 - 20)  SpO2: 100% (18 Jan 2018 06:39) (95% - 100%)      General appearance: NAD, Awake, Alert.  not cooperative. did answer qs today. maintaining Sat at RA  HEENT: NCAT,   Neck: Supple, No JVD  Lungs: Decreased breath sounds bilaterally. +rales. limited as was not taking deep breadth  Cardiovascular: S1S2, Irregularly irregular. SM+  Abdomen: Soft, Nontender,   Extremities: No clubbing, No cyanosis, No edema. no calf tenderness  CNS: alert, minimally verbal. moves limbs. withdraw after painful stimuli. resist movements      LABORATORY STUDIES:  ----------------------------------------                          11.4   7.9   )-----------( 192      ( 18 Jan 2018 06:40 )             35.3        01-17    145  |  104  |  16.0  ----------------------------<  100  3.9   |  30.0<H>  |  1.04    Ca    9.0      17 Jan 2018 02:54  Mg     1.7     01-17      Culture - Blood (collected 16 Jan 2018 02:10)  Source: .Blood Blood  Preliminary Report (18 Jan 2018 03:03):    No growth at 48 hours    Culture - Blood (collected 16 Jan 2018 02:09)  Source: .Blood Blood-Peripheral  Preliminary Report (18 Jan 2018 03:03):    No growth at 48 hours        < from: Xray Chest 1 View AP/PA. (01.17.18 @ 01:33) >    IMPRESSION:    Support Devices: None  Heart:  Unremarkable  Mediastinum:  Unremarkable  Lungs/Airways: Hazy bilateral airspace opacities  Bones/Soft tissues: Unremarkable    < end of copied text >    < from: Xray Chest 1 View AP- PORTABLE-Urgent (01.15.18 @ 09:33) >    IMPRESSION:   Left lower lobe airspace consolidation and/or effusion..        < end of copied text >

## 2018-01-18 NOTE — SWALLOW VFSS/MBS ASSESSMENT ADULT - DIAGNOSTIC IMPRESSIONS
Oral and pharyngeal stage of swallow judged to be WFL for puree with honey liquids. Observed penetration x1 with nectar liquids which spontaneously cleared. Due to impaired cognition, current recommended diet would be the appropriate diet at this time.  NO ASPIRATION OBSERVED ON THIS MBS

## 2018-01-18 NOTE — PROGRESS NOTE ADULT - PROBLEM SELECTOR PLAN 2
now in sinus  CHADs 2VASC score high cardio rec full AC, pt is fall risk seizure and dementia   continue coreg for rate control  on eliquis.  cardiology signed off. suggested no active intervention. family agreed  Tachycardia and BP higher side. increase coreg to 6.25 mg BID. monitor HR and BP. c/w monitor today

## 2018-01-18 NOTE — SWALLOW VFSS/MBS ASSESSMENT ADULT - ORAL PHASE
oral transit time impacted by impaired cognition/Delayed oral transit time oral transit time impacted by impaired cognition/Delayed oral transit time/Uncontrolled bolus / spillover in magan-pharynx Delayed oral transit time/Uncontrolled bolus / spillover in magan-pharynx/oral transit time impacted by impaired cognition Delayed oral transit time/oral transit time impacted by impaired cognition

## 2018-01-18 NOTE — PROGRESS NOTE ADULT - PROBLEM SELECTOR PLAN 1
possible GNR, on zosyn. hypoxemia currently better. possibly related to PNA. will not do CTA. on Eliquis. will not change rx. wbc trending down. UA neg for UTI. f/u ID. will do CXR in am and if ID clears, will DC to [facility] neg

## 2018-01-19 ENCOUNTER — TRANSCRIPTION ENCOUNTER (OUTPATIENT)
Age: 83
End: 2018-01-19

## 2018-01-19 DIAGNOSIS — N18.3 CHRONIC KIDNEY DISEASE, STAGE 3 (MODERATE): ICD-10-CM

## 2018-01-19 DIAGNOSIS — J18.9 PNEUMONIA, UNSPECIFIED ORGANISM: ICD-10-CM

## 2018-01-19 LAB
ALBUMIN SERPL ELPH-MCNC: 2.7 G/DL — LOW (ref 3.3–5.2)
ALP SERPL-CCNC: 96 U/L — SIGNIFICANT CHANGE UP (ref 40–120)
ALT FLD-CCNC: 18 U/L — SIGNIFICANT CHANGE UP
ANION GAP SERPL CALC-SCNC: 14 MMOL/L — SIGNIFICANT CHANGE UP (ref 5–17)
AST SERPL-CCNC: 40 U/L — HIGH
BILIRUB SERPL-MCNC: 0.5 MG/DL — SIGNIFICANT CHANGE UP (ref 0.4–2)
BUN SERPL-MCNC: 14 MG/DL — SIGNIFICANT CHANGE UP (ref 8–20)
CALCIUM SERPL-MCNC: 8.6 MG/DL — SIGNIFICANT CHANGE UP (ref 8.6–10.2)
CHLORIDE SERPL-SCNC: 103 MMOL/L — SIGNIFICANT CHANGE UP (ref 98–107)
CO2 SERPL-SCNC: 28 MMOL/L — SIGNIFICANT CHANGE UP (ref 22–29)
CREAT SERPL-MCNC: 1.04 MG/DL — SIGNIFICANT CHANGE UP (ref 0.5–1.3)
GLUCOSE BLDC GLUCOMTR-MCNC: 108 MG/DL — HIGH (ref 70–99)
GLUCOSE BLDC GLUCOMTR-MCNC: 116 MG/DL — HIGH (ref 70–99)
GLUCOSE SERPL-MCNC: 83 MG/DL — SIGNIFICANT CHANGE UP (ref 70–115)
HCT VFR BLD CALC: 33.2 % — LOW (ref 37–47)
HGB BLD-MCNC: 11 G/DL — LOW (ref 12–16)
MAGNESIUM SERPL-MCNC: 1.9 MG/DL — SIGNIFICANT CHANGE UP (ref 1.6–2.6)
MCHC RBC-ENTMCNC: 30.6 PG — SIGNIFICANT CHANGE UP (ref 27–31)
MCHC RBC-ENTMCNC: 33.1 G/DL — SIGNIFICANT CHANGE UP (ref 32–36)
MCV RBC AUTO: 92.2 FL — SIGNIFICANT CHANGE UP (ref 81–99)
PLATELET # BLD AUTO: 208 K/UL — SIGNIFICANT CHANGE UP (ref 150–400)
POTASSIUM SERPL-MCNC: 4 MMOL/L — SIGNIFICANT CHANGE UP (ref 3.5–5.3)
POTASSIUM SERPL-SCNC: 4 MMOL/L — SIGNIFICANT CHANGE UP (ref 3.5–5.3)
PROT SERPL-MCNC: 6.4 G/DL — LOW (ref 6.6–8.7)
RBC # BLD: 3.6 M/UL — LOW (ref 4.4–5.2)
RBC # FLD: 17.1 % — HIGH (ref 11–15.6)
SODIUM SERPL-SCNC: 145 MMOL/L — SIGNIFICANT CHANGE UP (ref 135–145)
WBC # BLD: 7.7 K/UL — SIGNIFICANT CHANGE UP (ref 4.8–10.8)
WBC # FLD AUTO: 7.7 K/UL — SIGNIFICANT CHANGE UP (ref 4.8–10.8)

## 2018-01-19 PROCEDURE — 99233 SBSQ HOSP IP/OBS HIGH 50: CPT

## 2018-01-19 PROCEDURE — 71045 X-RAY EXAM CHEST 1 VIEW: CPT | Mod: 26

## 2018-01-19 PROCEDURE — 99232 SBSQ HOSP IP/OBS MODERATE 35: CPT

## 2018-01-19 RX ORDER — PIPERACILLIN AND TAZOBACTAM 4; .5 G/20ML; G/20ML
1 INJECTION, POWDER, LYOPHILIZED, FOR SOLUTION INTRAVENOUS
Qty: 0 | Refills: 0 | COMMUNITY
Start: 2018-01-19

## 2018-01-19 RX ORDER — LEVETIRACETAM 250 MG/1
1 TABLET, FILM COATED ORAL
Qty: 60 | Refills: 0 | OUTPATIENT
Start: 2018-01-19 | End: 2018-02-17

## 2018-01-19 RX ORDER — LEVOTHYROXINE SODIUM 125 MCG
1 TABLET ORAL
Qty: 30 | Refills: 0 | OUTPATIENT
Start: 2018-01-19 | End: 2018-02-17

## 2018-01-19 RX ORDER — DONEPEZIL HYDROCHLORIDE 10 MG/1
1 TABLET, FILM COATED ORAL
Qty: 0 | Refills: 0 | COMMUNITY
Start: 2018-01-19

## 2018-01-19 RX ORDER — DONEPEZIL HYDROCHLORIDE 10 MG/1
1 TABLET, FILM COATED ORAL
Qty: 30 | Refills: 0 | OUTPATIENT
Start: 2018-01-19 | End: 2018-02-17

## 2018-01-19 RX ORDER — LEVOTHYROXINE SODIUM 125 MCG
1 TABLET ORAL
Qty: 0 | Refills: 0 | COMMUNITY
Start: 2018-01-19

## 2018-01-19 RX ORDER — APIXABAN 2.5 MG/1
1 TABLET, FILM COATED ORAL
Qty: 60 | Refills: 0 | OUTPATIENT
Start: 2018-01-19 | End: 2018-02-17

## 2018-01-19 RX ORDER — FUROSEMIDE 40 MG
20 TABLET ORAL ONCE
Qty: 0 | Refills: 0 | Status: DISCONTINUED | OUTPATIENT
Start: 2018-01-19 | End: 2018-01-20

## 2018-01-19 RX ORDER — CARVEDILOL PHOSPHATE 80 MG/1
1 CAPSULE, EXTENDED RELEASE ORAL
Qty: 0 | Refills: 0 | COMMUNITY
Start: 2018-01-19

## 2018-01-19 RX ORDER — LEVETIRACETAM 250 MG/1
1 TABLET, FILM COATED ORAL
Qty: 0 | Refills: 0 | COMMUNITY
Start: 2018-01-19

## 2018-01-19 RX ORDER — ESOMEPRAZOLE MAGNESIUM 40 MG/1
1 CAPSULE, DELAYED RELEASE ORAL
Qty: 30 | Refills: 0 | OUTPATIENT
Start: 2018-01-19 | End: 2018-02-17

## 2018-01-19 RX ORDER — CARVEDILOL PHOSPHATE 80 MG/1
1 CAPSULE, EXTENDED RELEASE ORAL
Qty: 60 | Refills: 0 | OUTPATIENT
Start: 2018-01-19 | End: 2018-02-17

## 2018-01-19 RX ORDER — AMLODIPINE BESYLATE 2.5 MG/1
1 TABLET ORAL
Qty: 30 | Refills: 0 | OUTPATIENT
Start: 2018-01-19 | End: 2018-02-17

## 2018-01-19 RX ORDER — ASPIRIN/CALCIUM CARB/MAGNESIUM 324 MG
1 TABLET ORAL
Qty: 30 | Refills: 0 | OUTPATIENT
Start: 2018-01-19 | End: 2018-02-17

## 2018-01-19 RX ORDER — FUROSEMIDE 40 MG
20 TABLET ORAL DAILY
Qty: 0 | Refills: 0 | Status: DISCONTINUED | OUTPATIENT
Start: 2018-01-20 | End: 2018-01-21

## 2018-01-19 RX ORDER — ASPIRIN/CALCIUM CARB/MAGNESIUM 324 MG
1 TABLET ORAL
Qty: 0 | Refills: 0 | COMMUNITY
Start: 2018-01-19

## 2018-01-19 RX ORDER — ALBUTEROL 90 UG/1
2 AEROSOL, METERED ORAL
Qty: 1 | Refills: 0 | OUTPATIENT
Start: 2018-01-19

## 2018-01-19 RX ORDER — MEMANTINE HYDROCHLORIDE 10 MG/1
1 TABLET ORAL
Qty: 60 | Refills: 0 | OUTPATIENT
Start: 2018-01-19 | End: 2018-02-17

## 2018-01-19 RX ADMIN — APIXABAN 2.5 MILLIGRAM(S): 2.5 TABLET, FILM COATED ORAL at 17:42

## 2018-01-19 RX ADMIN — MEMANTINE HYDROCHLORIDE 10 MILLIGRAM(S): 10 TABLET ORAL at 11:19

## 2018-01-19 RX ADMIN — CARVEDILOL PHOSPHATE 6.25 MILLIGRAM(S): 80 CAPSULE, EXTENDED RELEASE ORAL at 06:40

## 2018-01-19 RX ADMIN — PIPERACILLIN AND TAZOBACTAM 25 GRAM(S): 4; .5 INJECTION, POWDER, LYOPHILIZED, FOR SOLUTION INTRAVENOUS at 05:11

## 2018-01-19 RX ADMIN — PANTOPRAZOLE SODIUM 40 MILLIGRAM(S): 20 TABLET, DELAYED RELEASE ORAL at 06:40

## 2018-01-19 RX ADMIN — LEVETIRACETAM 250 MILLIGRAM(S): 250 TABLET, FILM COATED ORAL at 06:40

## 2018-01-19 RX ADMIN — Medication 81 MILLIGRAM(S): at 11:19

## 2018-01-19 RX ADMIN — Medication 100 MICROGRAM(S): at 06:40

## 2018-01-19 RX ADMIN — LEVETIRACETAM 250 MILLIGRAM(S): 250 TABLET, FILM COATED ORAL at 17:42

## 2018-01-19 RX ADMIN — APIXABAN 2.5 MILLIGRAM(S): 2.5 TABLET, FILM COATED ORAL at 06:40

## 2018-01-19 RX ADMIN — PIPERACILLIN AND TAZOBACTAM 25 GRAM(S): 4; .5 INJECTION, POWDER, LYOPHILIZED, FOR SOLUTION INTRAVENOUS at 11:19

## 2018-01-19 RX ADMIN — AMLODIPINE BESYLATE 5 MILLIGRAM(S): 2.5 TABLET ORAL at 06:40

## 2018-01-19 NOTE — DISCHARGE NOTE ADULT - MEDICATION SUMMARY - MEDICATIONS TO TAKE
I will START or STAY ON the medications listed below when I get home from the hospital:    aspirin 81 mg oral tablet, chewable  -- 1 tab(s) by mouth once a day  -- Indication: For Coronary artery disease    apixaban 2.5 mg oral tablet  -- 1 tab(s) by mouth every 12 hours  -- Indication: For Afib    levETIRAcetam 250 mg oral tablet  -- 1 tab(s) by mouth 2 times a day  -- Indication: For Seizure    carvedilol 6.25 mg oral tablet  -- 1 tab(s) by mouth every 12 hours  -- Indication: For Atrial fibrillation with RVR    albuterol 90 mcg/inh inhalation aerosol  -- 2 puff(s) inhaled every 4 hours as needed PRN for shortness Breadth/wheezing  -- For inhalation only.  It is very important that you take or use this exactly as directed.  Do not skip doses or discontinue unless directed by your doctor.  Obtain medical advice before taking any non-prescription drugs as some may affect the action of this medication.  Shake well before use.    -- Indication: For Shortnessof breadth/wheezing    amLODIPine 5 mg oral tablet  -- 1 tab(s) by mouth once a day  -- Indication: For HTN    donepezil 10 mg oral tablet  -- 1 tab(s) by mouth once a day (at bedtime)  -- Indication: For Dementia without behavioral disturbance, unspecified dementia type    memantine 10 mg oral tablet  -- 1 tab(s) by mouth 2 times a day  -- Indication: For Dementia without behavioral disturbance, unspecified dementia type    piperacillin-tazobactam 2 g-0.25 g intravenous injection  -- 1 dose(s) intravenous every 8 hours for total of 10 doses  -- Indication: For HCAP    NexIUM 40 mg oral delayed release capsule  -- 1 cap(s) by mouth once a day  -- Indication: For Dyspepsia    levothyroxine 100 mcg (0.1 mg) oral tablet  -- 1 tab(s) by mouth once a day  -- Indication: For Hypothyroid I will START or STAY ON the medications listed below when I get home from the hospital:    aspirin 81 mg oral tablet, chewable  -- 1 tab(s) by mouth once a day  -- Indication: For Coronary artery disease    apixaban 2.5 mg oral tablet  -- 1 tab(s) by mouth every 12 hours  -- Indication: For Afib    levETIRAcetam 250 mg oral tablet  -- 1 tab(s) by mouth 2 times a day  -- Indication: For Seizure    carvedilol 6.25 mg oral tablet  -- 1 tab(s) by mouth every 12 hours  -- Indication: For Atrial fibrillation with RVR    albuterol 90 mcg/inh inhalation aerosol  -- 2 puff(s) inhaled every 4 hours as needed PRN for shortness Breadth/wheezing  -- For inhalation only.  It is very important that you take or use this exactly as directed.  Do not skip doses or discontinue unless directed by your doctor.  Obtain medical advice before taking any non-prescription drugs as some may affect the action of this medication.  Shake well before use.    -- Indication: For Shortnessof breadth/wheezing    amLODIPine 5 mg oral tablet  -- 1 tab(s) by mouth once a day  -- Indication: For HTN    donepezil 10 mg oral tablet  -- 1 tab(s) by mouth once a day (at bedtime)  -- Indication: For Dementia without behavioral disturbance, unspecified dementia type    memantine 10 mg oral tablet  -- 1 tab(s) by mouth 2 times a day  -- Indication: For Dementia without behavioral disturbance, unspecified dementia type    NexIUM 40 mg oral delayed release capsule  -- 1 cap(s) by mouth once a day  -- Indication: For Dyspepsia    levothyroxine 100 mcg (0.1 mg) oral tablet  -- 1 tab(s) by mouth once a day  -- Indication: For Hypothyroid

## 2018-01-19 NOTE — DISCHARGE NOTE ADULT - SECONDARY DIAGNOSIS.
Urinary tract infection with hematuria, site unspecified Pneumonia of both lungs due to infectious organism, unspecified part of lung Protein calorie malnutrition Non-sustained ventricular tachycardia Essential hypertension Hypernatremia

## 2018-01-19 NOTE — PROGRESS NOTE ADULT - PROBLEM SELECTOR PLAN 9
Urine cx: corynebacterium in urine  last UA neg. f/u culture
Urine cx: corynebacterium in urine  off cefepime  afebrile, WBC wnl
Urine cx: corynebacterium in urine  last UA neg. f/u culture
Urine cx: corynebacterium in urine  off cefepime  afebrile, WBC wnl
Urine cx: corynebacterium in urine  last UA neg. f/u culture

## 2018-01-19 NOTE — DISCHARGE NOTE ADULT - CARE PROVIDERS DIRECT ADDRESSES
,[email or web address],DirectAddress_Unknown,DirectAddress_Unknown

## 2018-01-19 NOTE — DISCHARGE NOTE ADULT - CARE PROVIDER_API CALL
Jeremy Lundberg), Cardiovascular Disease; Internal Medicine; Interventional Cardiology  [address]  [address]  [place] [address]  Phone: [phone]  Fax: [phone]    Ty Edge), Neurology; Vascular Neurology  [address]  [address]  [place] [address]  Phone: [phone]  Fax: [phone]    primary care MD,   see PMD in 1-2 week  Phone: (   )    -  Fax: (   )    -

## 2018-01-19 NOTE — DISCHARGE NOTE ADULT - OTHER SIGNIFICANT FINDINGS
74 year old female with PMHx of afib , mvp and breast cancer was found to be lethargic by family EMS called who apparently palpated thready pulses and started cpr for few seconds per family when pt woke up. She was transferred by ambulance and came into ER with altered mental status and hypothermic found to be hypotensive . Found to be septic from UTI with shock and AMS, admitted to ICU on levophed. Pt had AF w RVR and received digoxin loading but developed bradycardia. HR currently normal. seen by neurology. suggested to c/w keppra. neurology signed off. Seen at bedside. did not answer question  CXr showed consolidation, right effusion, wbc went up. zosyn was started for possible aspiration PNA. mild temp. spoke to MS. Andrea Salgado @ [phone]. she had family meeting and family decided to start with PO NOAC. discussed about options with her and risk/benefit proles of different NOACs. she is ok with Eliquis. Family want her to go [facility] after DC. seen by PT today. suggested [facility] vs home/assisst . informed her about CXr findings, NSVT and cardiology sugegsttion. family does not want any cardiac intervention at this time. Palliative care consulted. family wants CPR only. DNI. Speech swallow evalution was done and recommendation was provided. ID was consulted for HCAP, suspected aspiration. Aspiration precaution was maintained. Fall precaution maintained.        Problem/Plan - 1:  ·  Problem: Hospital acquired PNA.  Plan: suspected GNR, s/p zosyn. Currently being discharged with…hypoxemia improved. UA neg for UTI. ID clears, DC to [facility]  BC neg.      Problem/Plan - 2:  ·  Problem: Atrial fibrillation with RVR.  Plan: now in sinus/PSVT  On coreg. Eliquis     Problem/Plan - 3:  ·  Problem: Non-sustained ventricular tachycardia.  Plan: on tele monitor. no further episode last 24 hrs. c/w coreg. normal electrolytes. possible Ischaemic etiology. no intervention planned as per cardio. cardiology f/u. c/w tele monitor. as above.      Problem/Plan - 4:  ·  Problem: Dementia without behavioral disturbance, unspecified dementia type.  Plan: on donepezil and Memantine- was on hold due to leigh per cardio. restarted as HR improves and patient became increasingly noncooperative. patient is communicating today . will c/w meds. at home having periods of inattention which was diagnosed as seizure and being treated with Keppra.   neuro eval noted- continue keppra - EEG done no seizures.      Problem/Plan - 5:  ·  Problem: Advanced care planning/counseling discussion.  Plan: Palliative care consulted to discuss the over all Goals of care. family wants CPR, DNI. [facility].      Problem/Plan - 6:  Problem: Hypernatremia. Plan: improved. off NS 0.225%@ 60 mls/hr. monitor lytes. hypomag: on supplement. monitor electrolytes.     Problem/Plan - 7:  ·  Problem: Protein calorie malnutrition.  Plan: nutrition cx appreciated.      Problem/Plan - 8:  ·  Problem: Prophylactic measure.  Plan: continue SQ Heparin.      Problem/Plan - 9:  ·  Problem: UTI (urinary tract infection).  Plan: Urine cx: corynebacterium in urine  last UA neg. Id consulted    plan of care discussed with family. return precautions discussed. currently being dced to [facility] at stable condition. 74 year old female with PMHx of afib , mvp and breast cancer was found to be lethargic by family EMS called who apparently palpated thready pulses and started cpr for few seconds per family when pt woke up. She was transferred by ambulance and came into ER with altered mental status and hypothermic found to be hypotensive . Found to be septic from UTI with shock and AMS, admitted to ICU on levophed. Pt had AF w RVR and received digoxin loading but developed bradycardia. HR currently normal. seen by neurology. suggested to c/w keppra. neurology signed off. Seen at bedside. did not answer question  CXr showed consolidation, right effusion, wbc went up. zosyn was started for possible aspiration PNA. mild temp. spoke to MS. Andrea Salgado @ [phone]. she had family meeting and family decided to start with PO NOAC. discussed about options with her and risk/benefit proles of different NOACs. she is ok with Eliquis. Family want her to go [facility] after DC. seen by PT today. suggested [facility] vs home/assisst . informed her about CXr findings, NSVT and cardiology sugegsttion. family does not want any cardiac intervention at this time. Palliative care consulted. family wants CPR only. DNI. Speech swallow evalution was done and recommendation was provided. ID was consulted for HCAP, suspected aspiration. Aspiration precaution was maintained. Fall precaution maintained.        Problem/Plan - 1:  ·  Problem: Hospital acquired PNA.  Plan: suspected GNR, s/p zosyn. Currently being discharged with…hypoxemia improved. UA neg for UTI. ID clears, DC to [facility]  BC neg.      Problem/Plan - 2:  ·  Problem: Atrial fibrillation with RVR.  Plan: now in sinus/PSVT  On coreg. Eliquis     Problem/Plan - 3:  ·  Problem: Non-sustained ventricular tachycardia.  Plan: on tele monitor. no further episode last 24 hrs. c/w coreg. normal electrolytes. possible Ischaemic etiology. no intervention planned as per cardio. cardiology f/u. c/w tele monitor. as above.      Problem/Plan - 4:  ·  Problem: Dementia without behavioral disturbance, unspecified dementia type.  Plan: on donepezil and Memantine- was on hold due to leigh per cardio. restarted as HR improves and patient became increasingly noncooperative. patient is communicating today . will c/w meds. at home having periods of inattention which was diagnosed as seizure and being treated with Keppra.   neuro eval noted- continue keppra - EEG done no seizures.      Problem/Plan - 5:  ·  Problem: Advanced care planning/counseling discussion.  Plan: Palliative care consulted to discuss the over all Goals of care. family wants CPR, DNI. [facility].      Problem/Plan - 6:  Problem: Hypernatremia. Plan: improved. off NS 0.225%@ 60 mls/hr. monitor lytes. hypomag: on supplement. monitor electrolytes.     Problem/Plan - 7:  ·  Problem: Protein calorie malnutrition.  Plan: nutrition cx appreciated.      Problem/Plan - 8:  ·  Problem: Prophylactic measure.  Plan: continue SQ Heparin.      Problem/Plan - 9:  ·  Problem: UTI (urinary tract infection).  Plan: Urine cx: corynebacterium in urine  last UA neg. Id consulted       Problem/Plan - 9:  pulmonary edema: lasix IV 20 mg stat. will DC with PO lasix. will see cardiology in 1 week. patient is in no resp distress and maintaining SaO2 in RA  aspiration precaution      plan of care discussed with family. return precautions discussed. currently being dced to [facility] at stable condition. 74 year old female with PMHx of afib , mvp and breast cancer was found to be lethargic by family EMS called who apparently palpated thready pulses and started cpr for few seconds per family when pt woke up. She was transferred by ambulance and came into ER with altered mental status and hypothermic found to be hypotensive . Found to be septic from UTI with shock and AMS, admitted to ICU on levophed. Pt had AF w RVR and received digoxin loading but developed bradycardia. HR currently normal. seen by neurology. suggested to c/w keppra. neurology signed off. Seen at bedside. did not answer question  CXr showed consolidation, right effusion, wbc went up. zosyn was started for possible aspiration PNA. mild temp. spoke to MS. Andrea Salgado @ [phone]. she had family meeting and family decided to start with PO NOAC. discussed about options with her and risk/benefit proles of different NOACs. she is ok with Eliquis. Family want her to go [facility] after DC. seen by PT today. suggested [facility] vs home/assisst . informed her about CXr findings, NSVT and cardiology sugegsttion. family does not want any cardiac intervention at this time. Palliative care consulted. family wants CPR only. DNI. Speech swallow evalution was done and recommendation was provided. ID was consulted for HCAP, suspected aspiration. Aspiration precaution was maintained. Fall precaution maintained.        Problem/Plan - 1:  ·  Problem: Hospital acquired PNA.  Plan: suspected GNR, on zosyn#4 today. Currently being discharged with zosyn IV to be completed for total o7 days. hypoxia improved. UA neg for UTI. ID clears for DC with IV abx, DC to [facility]  BC neg.      Problem/Plan - 2:  ·  Problem: Atrial fibrillation with RVR.  Plan: now in sinus/PSVT  On coreg. Eliquis     Problem/Plan - 3:  ·  Problem: Non-sustained ventricular tachycardia.  Plan: on tele monitor. no further episode last 24 hrs. c/w coreg. normal electrolytes. possible Ischaemic etiology. no intervention planned as per cardio. cardiology f/u. off tele monitor. as above.      Problem/Plan - 4:  ·  Problem: Dementia without behavioral disturbance, unspecified dementia type.  Plan: on donepezil and Memantine- was on hold due to leigh per cardio. restarted as HR improves and patient became increasingly noncooperative. patient is communicating today . will c/w meds. at home having periods of inattention which was diagnosed as seizure and being treated with Keppra.   neuro eval noted- continue keppra - EEG done no seizures. needs neuro f/u OP     Problem/Plan - 5:  ·  Problem: Advanced care planning/counseling discussion.  Plan: Palliative care consulted to discuss the over all Goals of care. family wants CPR, DNI. [facility].      Problem/Plan - 6:  Problem: Hypernatremia. Plan: improved. off NS 0.225%@ 60 mls/hr. monitor lytes. hypomag: supplemented. monitor electrolytes.     Problem/Plan - 7:  ·  Problem: Protein calorie malnutrition.  Plan: nutrition cx appreciated.      Problem/Plan - 8:  ·  Problem: Prophylactic measure.  Plan:  SQ Heparin.      Problem/Plan - 9:  ·  Problem: UTI (urinary tract infection).  Plan: Urine cx: corynebacterium in urine  last UA neg. Id consulted       Problem/Plan - 9:  pulmonary edema: lasix IV 20 mg stat. will DC with PO lasix. will see cardiology in 1 week. patient is in no resp distress and maintaining SaO2 in RA  aspiration precaution      plan of care discussed with family. return precautions discussed. currently being dced to [facility] at stable condition.

## 2018-01-19 NOTE — PROGRESS NOTE ADULT - ASSESSMENT
this 84 y.o. woman, here for lethargy, found with possible consolidation on lung imaging, treated with cefepime, then developed recurrent fever.  urine cx showed corynebacterium, which is a contaminant and UA has many epithelial cells.    Recurrent fever concerning for aspiration PNA.   RESOLVED WBC on Zosyn

## 2018-01-19 NOTE — DISCHARGE NOTE ADULT - MEDICATION SUMMARY - MEDICATIONS TO STOP TAKING
I will STOP taking the medications listed below when I get home from the hospital:    warfarin 5 mg oral tablet  -- 5  orally

## 2018-01-19 NOTE — PROGRESS NOTE ADULT - ASSESSMENT
74 year old female patient with UTI/septic shock, AMS, AF w RVR    74 year old female with PMHx of afib , mvp and breast cancer was found to be lethargic by family EMS called who apparently palpated thready pulses and started cpr for few seconds per family when pt woke up. She was transferred by ambulance and came into ER with altered mental status and hypothermic found to be hypotensive . Found to be septic from UTI with shock and AMS, admitted to ICU on levophed. Pt had AF w RVR and received digoxin loading but developed bradycardia. HR currently normal. seen by neurology. suggested to c/w keppra. neurology signed off. Seen at bedside. did not answer question  CXr showed consolidation, right effusion, wbc went up. zosyn was started for possible aspiration PNA. mild temp.   spoke to MS. Andrea Salgado @ [phone]. she had family meeting and family decided to start with PO NOAC. discussed about options with her and risk/benefit proles of different NOACs. she is ok with Eliquis. Family want her to go [facility] after DC. seen by PT today. suggested [facility] vs home/assisst . informed her about CXr findings, NSVT and cardiology sugegsttion. family does not want any cardiac intervention at this time.  family wants CPR only. no DNI

## 2018-01-19 NOTE — CHART NOTE - NSCHARTNOTESELECT_GEN_ALL_CORE
Code sepsis/Event Note
Event Note
Nutrition Services

## 2018-01-19 NOTE — PROGRESS NOTE ADULT - PROBLEM SELECTOR PROBLEM 7
Protein calorie malnutrition
Prophylactic measure
Protein calorie malnutrition
Prophylactic measure

## 2018-01-19 NOTE — PROGRESS NOTE ADULT - PROBLEM SELECTOR PLAN 7
nutrition cx appreciated
nutrition cx appreciated
nutrition cx
continue SQ Heparin
nutrition cx appreciated
continue SQ Heparin

## 2018-01-19 NOTE — PROGRESS NOTE ADULT - SUBJECTIVE AND OBJECTIVE BOX
HIPOLITO AGUILAR  ----------------------------------------    CC:  maiw septic shock due to UTI, hypothermia,  AMS, advanced dementia,    Seen at bedside. more communicative today. however answering selectively.  denied chest pain/SOB/palpitation. cough+.   wants to go home  awaiting ID clearance for DC to [facility]    Tele: brief PSVT. sinus.     Adm HPI:  74 year old female with PMHx of afib , mvp and breast cancer was found to be lethargic by family EMS called who apparently palpated thready pulses and started cpr for few seconds per family when pt woke up. She was transferred by ambulance and came into ER with altered mental status and hypothermic found to be hypotensive . Found to be septic from UTI with shock and AMS, admitted to ICU on levophed. Pt had AF w RVR and received digoxin loading but developed bradycardia. HR currently normal. seen by neurology. suggested to c/w keppra. neurology signed off today. dementia w/u OP    ROs:  limited.  no fever/chills.  cough+.   no SOB/CP  NO abd pain/n/v.  no headaches/weakness          PHYSICAL EXAMINATION:  ----------------------------------------    Vital Signs Last 24 Hrs  T(C): 36.7 (18 Jan 2018 20:00), Max: 36.7 (18 Jan 2018 20:00)  T(F): 98.1 (18 Jan 2018 20:00), Max: 98.1 (18 Jan 2018 20:00)  HR: 82 (19 Jan 2018 06:03) (59 - 92)  BP: 174/72 (19 Jan 2018 06:03) (111/55 - 174/72)  BP(mean): --  RR: 20 (18 Jan 2018 15:55) (20 - 20)  SpO2: 99% (19 Jan 2018 04:40) (97% - 99%)    General appearance: NAD, Awake, Alert.  not cooperative. did answer qs today. maintaining Sat at RA  HEENT: NCAT,   Neck: Supple, No JVD  Lungs: Decreased breath sounds bilaterally.  + rales. + ronchi  Cardiovascular: S1S2, RRR. SM+  Abdomen: Soft, Nontender,   Extremities: No clubbing, No cyanosis, No edema. no calf tenderness  CNS: alert, minimally verbal. moves limbs. withdraw after painful stimuli. resist movements      LABORATORY STUDIES:  ----------------------------------------                          11.0   7.7   )-----------( 208      ( 19 Jan 2018 06:19 )             33.2       01-19    145  |  103  |  14.0  ----------------------------<  83  4.0   |  28.0  |  1.04    Ca    8.6      19 Jan 2018 06:19  Mg     1.9     01-19    TPro  6.4<L>  /  Alb  2.7<L>  /  TBili  0.5  /  DBili  x   /  AST  40<H>  /  ALT  18  /  AlkPhos  96  01-19      Culture - Blood (collected 16 Jan 2018 02:10)  Source: .Blood Blood  Preliminary Report (18 Jan 2018 03:03):    No growth at 48 hours    Culture - Blood (collected 16 Jan 2018 02:09)  Source: .Blood Blood-Peripheral  Preliminary Report (18 Jan 2018 03:03):    No growth at 48 hours    CAPILLARY BLOOD GLUCOSE      POCT Blood Glucose.: 116 mg/dL (19 Jan 2018 08:20)  POCT Blood Glucose.: 137 mg/dL (18 Jan 2018 21:29)  POCT Blood Glucose.: 97 mg/dL (18 Jan 2018 16:34)  POCT Blood Glucose.: 88 mg/dL (18 Jan 2018 12:09)      < from: Xray Chest 1 View AP/PA. (01.17.18 @ 01:33) >    IMPRESSION:< from: Xray Chest 1 View AP -PORTABLE-Routine (01.19.18 @ 03:30) >  Impression:  Development of bilateral perihilar pulmonary edema since the prior   examination..    < end of copied text >      Support Devices: None  Heart:  Unremarkable  Mediastinum:  Unremarkable  Lungs/Airways: Hazy bilateral airspace opacities  Bones/Soft tissues: Unremarkable    < end of copied text >    < from: Xray Chest 1 View AP- PORTABLE-Urgent (01.15.18 @ 09:33) >    IMPRESSION:   Left lower lobe airspace consolidation and/or effusion..        < end of copied text >    < from: TTE Echo Complete w/Doppler (01.09.18 @ 13:59) >   Summary:   1. Left ventricular ejection fraction, by visual estimation, is >75%.   2. Hyperdynamic global left ventricular systolic function.   3. The left ventricular diastolic function could not be assessed in this   study.   4. There is no evidence of pericardial effusion.   5. Sclerotic aortic valve with normal opening.   6. There is intracavitary gradient of 23 mm HG, probably secondary to   volume depletion with hyperdynamic left ventricle.  7. Normal right ventricular size and function.   8. Mild mitral valve regurgitation.    < end of copied text >

## 2018-01-19 NOTE — PROGRESS NOTE ADULT - PROBLEM SELECTOR PLAN 4
on donepezil and Memantine- was on hold due to leigh per cardio. restarted as HR improves and patient became increasingly noncooperative. patient is communicating today . will c/w meds. at home having periods of inattention which was diagnosed as seizure and being treated with Keppra.   neuro eval noted- continue keppra - EEG done no seizures. will see neurology OP in 3-4 weeks

## 2018-01-19 NOTE — DISCHARGE NOTE ADULT - ADDITIONAL INSTRUCTIONS
• Recommended Consistencies	PUREE with HONEY LIQUIDS  • Recommended Feeding/Eating Techniques	allow for swallow between intakes; check mouth frequently for oral residue/pocketing; crush medication (when feasible); maintain upright posture during/after eating for 30 mins; no straws; oral hygiene; position upright (90 degrees); small sips/bites  • Feeding Assistance	dependent (1 to 1); due to impaired cognition  • Aspiration Precautions	yes • Recommended Consistencies	PUREE with HONEY LIQUIDS. Ensure pudding TID.  • Recommended Feeding/Eating Techniques	allow for swallow between intakes; check mouth frequently for oral residue/pocketing; crush medication (when feasible); maintain upright posture during/after eating for 30 mins; no straws; oral hygiene; position upright (90 degrees); small sips/bites  • Feeding Assistance	dependent (1 to 1); due to impaired cognition  • Aspiration Precautions	yes

## 2018-01-19 NOTE — DISCHARGE NOTE ADULT - PLAN OF CARE
improved return to ER if recur resolved. drink atleast 2 liter of water. continue and finish antibiotics. Seek immediate medical attention if fever/worsening cough/Shrotness of breadth or any other concerning symptoms. continue diet as directed. follow speech pathology direction provided continue meds. see cardiology in 1 week continue meds. f/u with Primary care MD improved. drink 2-3 liter water atleast. get your primary Md recheck sodium in 1-2 weeks. for seizure and dementia. continue with keppra. see neurology in 2-3 weeks with CKD stage 3a/A3: normal Creatinine. Hypernatremia improved drink atleast 2- 3 liter of water. get your primary Md recheck kidney function in 2 weeks. for seizure and dementia. continue with keppra. see neurology in 2-3 weeks continue and finish antibiotics. Seek immediate medical attention if fever/worsening cough/Shrotness of breadth or any other concerning symptoms. CXR todays howed perihilar pulmonary edema. continue lasix, patient needs to folllow up with cardiology in 1 week. Completed antibiotics . Seek immediate medical attention if fever/worsening cough/Shrotness of breadth or any other concerning symptoms. CXR todays howed perihilar pulmonary edema.  patient needs to folllow up with cardiology in 1 week.

## 2018-01-19 NOTE — CHART NOTE - NSCHARTNOTEFT_GEN_A_CORE
Source: Patient [ ]  Family [ ]   other [ x]    Current Diet: Puree with nectar liquids    Patient reports [ ] nausea  [ ] vomiting [ ] diarrhea [ ] constipation  [ ]chewing problems [ ] swallowing issues  [ ] other:     PO intake:  < 50% [x ]   50-75%  [ ]   %  [ ]  other :    Source for PO intake [ ] Patient [ ] family [x ] chart [ ] staff [ ] other    Enteral /Parenteral Nutrition:     Current Weight:     % Weight Change     Pertinent Medications: MEDICATIONS  (STANDING):  acetylcysteine 20% Inhalation 3 milliLiter(s) Inhalation every 8 hours  ALBUTerol    0.083% 2.5 milliGRAM(s) Nebulizer every 8 hours  amLODIPine   Tablet 5 milliGRAM(s) Oral once  amLODIPine   Tablet 5 milliGRAM(s) Oral daily  apixaban 2.5 milliGRAM(s) Oral every 12 hours  aspirin  chewable 81 milliGRAM(s) Oral daily  carvedilol 6.25 milliGRAM(s) Oral every 12 hours  donepezil 10 milliGRAM(s) Oral at bedtime  levETIRAcetam 250 milliGRAM(s) Oral two times a day  levothyroxine 100 MICROGram(s) Oral daily  memantine 10 milliGRAM(s) Oral daily  pantoprazole    Tablet 40 milliGRAM(s) Oral before breakfast  piperacillin/tazobactam IVPB. 3.375 Gram(s) IV Intermittent every 8 hours    MEDICATIONS  (PRN):  acetaminophen  Suppository 650 milliGRAM(s) Rectal every 6 hours PRN For Temp greater than 38 C (100.4 F)  hydrALAZINE Injectable 10 milliGRAM(s) IV Push every 2 hours PRN SBP> 160    Pertinent Labs: CBC Full  -  ( 19 Jan 2018 06:19 )  WBC Count : 7.7 K/uL  Hemoglobin : 11.0 g/dL  Hematocrit : 33.2 %  Platelet Count - Automated : 208 K/uL  Mean Cell Volume : 92.2 fl  Mean Cell Hemoglobin : 30.6 pg  Mean Cell Hemoglobin Concentration : 33.1 g/dL  Auto Neutrophil # : x  Auto Lymphocyte # : x  Auto Monocyte # : x  Auto Eosinophil # : x  Auto Basophil # : x  Auto Neutrophil % : x  Auto Lymphocyte % : x  Auto Monocyte % : x  Auto Eosinophil % : x  Auto Basophil % : x    01-19 Na145 mmol/L Glu 83 mg/dL K+ 4.0 mmol/L Cr  1.04 mg/dL BUN 14.0 mg/dL Phos n/a   Alb 2.7 g/dL<L> PAB n/a             Skin:     Nutrition focused physical exam conducted - found signs of malnutrition [ ]absent [x ]present    Subcutaneous fat loss: [x ] Orbital fat pads region, [ ]Buccal fat region, [ ]Triceps region,  [ ]Ribs region    Muscle wasting: [x ]Temples region, [x ]Clavicle region, [ ]Shoulder region, [ ]Scapula region, [ ]Interosseous region,  [ ]thigh region, [ ]Calf region    Estimated Needs:   [x ] no change since previous assessment  [ ] recalculated:     Current Nutrition Diagnosis:  Pt presents with moderate protein calorie malnutrition, related to insufficient PO intake, as evidenced by po< 25% x 7 days  and physical signs of muscle and fat loss ( temples, orbital, clavicles). Pt had MBS. Diet now puree with nectar liquids taking meals poorly. Pt to be discharged today.         Recommendations: Continue to monitor po intake, Pt would benefit from supplementation. Ensure pudding TID.    Monitoring and Evaluation:   x[ ] PO intake [ ] Tolerance to diet prescription [X] Weights  [X] Follow up per protocol [X] Labs:

## 2018-01-19 NOTE — PROGRESS NOTE ADULT - PROBLEM SELECTOR PROBLEM 2
Septic shock
Septic shock
Atrial fibrillation with RVR
Hospital acquired PNA
Non-sustained ventricular tachycardia
Septic shock
Urinary tract infection without hematuria, site unspecified
Atrial fibrillation with RVR
Debility
Atrial fibrillation with RVR
Non-sustained ventricular tachycardia

## 2018-01-19 NOTE — PROGRESS NOTE ADULT - SUBJECTIVE AND OBJECTIVE BOX
[facility]  INFECTIOUS DISEASES AND INTERNAL MEDICINE at [place]  =======================================================  Orestes March MD  Diplomates American Board of Internal Medicine and Infectious Diseases  =======================================================    JEFF HIPOLITO [id number]  chief complaint: follow up aspiration pneumonia  patient seen and examined in follow up.  Chart and labs reviewed.     resolution of leukocytosis.   =======================================================  Allergies:  latex (Unknown)  No Known Drug Allergies  shellfish (Hives; Rash)  shellfish (Other)    =======================================================  Medications:  acetaminophen  Suppository 650 milliGRAM(s) Rectal every 6 hours PRN  acetylcysteine 20% Inhalation 3 milliLiter(s) Inhalation every 8 hours  ALBUTerol    0.083% 2.5 milliGRAM(s) Nebulizer every 8 hours  amLODIPine   Tablet 5 milliGRAM(s) Oral once  amLODIPine   Tablet 5 milliGRAM(s) Oral daily  apixaban 2.5 milliGRAM(s) Oral every 12 hours  aspirin  chewable 81 milliGRAM(s) Oral daily  carvedilol 6.25 milliGRAM(s) Oral every 12 hours  donepezil 10 milliGRAM(s) Oral at bedtime  furosemide   Injectable 20 milliGRAM(s) IV Push once  hydrALAZINE Injectable 10 milliGRAM(s) IV Push every 2 hours PRN  levETIRAcetam 250 milliGRAM(s) Oral two times a day  levothyroxine 100 MICROGram(s) Oral daily  memantine 10 milliGRAM(s) Oral daily  pantoprazole    Tablet 40 milliGRAM(s) Oral before breakfast  piperacillin/tazobactam IVPB. 3.375 Gram(s) IV Intermittent every 8 hours    =======================================================  REVIEW OF SYSTEMS:    CONSTITUTIONAL:  No Fever or chills  HEENT:   No diplopia or blurred vision.  No earache, sore throat or runny nose.  CARDIOVASCULAR:  No pressure, squeezing, strangling, tightness, heaviness or aching about the chest, neck, axilla or epigastrium.  RESPIRATORY:  No cough, shortness of breath, PND or orthopnea.  GASTROINTESTINAL:  No nausea, vomiting or diarrhea.  GENITOURINARY:  No dysuria, frequency or urgency. No Blood in urine  MUSCULOSKELETAL:  no joint aches, no muscle pain  SKIN:  No change in skin, hair or nails.  NEUROLOGIC:  No paresthesias, fasciculations, seizures or weakness.  PSYCHIATRIC:  No disorder of thought or mood.  ENDOCRINE:  No heat or cold intolerance, polyuria or polydipsia.  HEMATOLOGICAL:  No easy bruising or bleeding.     =======================================================  Physical Exam:    Vital Signs Last 24 Hrs  T(C): 36.7 (18 Jan 2018 20:00), Max: 36.7 (18 Jan 2018 20:00)  T(F): 98.1 (18 Jan 2018 20:00), Max: 98.1 (18 Jan 2018 20:00)  HR: 82 (19 Jan 2018 06:03) (59 - 92)  BP: 174/72 (19 Jan 2018 06:03) (111/55 - 174/72)  RR: 20 (18 Jan 2018 15:55) (20 - 20)  SpO2: 99% (19 Jan 2018 04:40) (97% - 99%)    GEN: NAD, pleasant, thin  HEENT: normocephalic and atraumatic. EOMI. KIRILL.    NECK: Supple. No carotid bruits.  No lymphadenopathy or thyromegaly.  LUNGS: diminished breath sounds at bases  HEART: Regular rate and rhythm without murmur.  ABDOMEN: Soft, nontender, and nondistended.  Positive bowel sounds.    : No CVA tenderness  EXTREMITIES: Without any cyanosis, clubbing, rash, lesions or edema.  MSK: no joint swelling  NEUROLOGIC: Cranial nerves II through XII are grossly intact.  KIN: No ulceration or induration present.    =======================================================    Labs:  01-19    145  |  103  |  14.0  ----------------------------<  83  4.0   |  28.0  |  1.04    Ca    8.6      19 Jan 2018 06:19  Mg     1.9     01-19    TPro  6.4<L>  /  Alb  2.7<L>  /  TBili  0.5  /  DBili  x   /  AST  40<H>  /  ALT  18  /  AlkPhos  96  01-19                          11.0   7.7   )-----------( 208      ( 19 Jan 2018 06:19 )             33.2           LIVER FUNCTIONS - ( 19 Jan 2018 06:19 )  Alb: 2.7 g/dL / Pro: 6.4 g/dL / ALK PHOS: 96 U/L / ALT: 18 U/L / AST: 40 U/L / GGT: x               CAPILLARY BLOOD GLUCOSE      POCT Blood Glucose.: 116 mg/dL (19 Jan 2018 08:20)  POCT Blood Glucose.: 137 mg/dL (18 Jan 2018 21:29)  POCT Blood Glucose.: 97 mg/dL (18 Jan 2018 16:34)  POCT Blood Glucose.: 88 mg/dL (18 Jan 2018 12:09)        RECENT CULTURES:  01-17 @ 13:12 .Urine Catheterized     No growth        01-17 @ 03:54 .Blood Blood     No growth at 48 hours        01-16 @ 02:10 .Blood Blood     No growth at 48 hours        01-16 @ 02:09 .Blood Blood-Peripheral     No growth at 48 hours        01-16 @ 00:39          NotDetec  01-08 @ 03:15 .Urine Catheterized     50,000 - 99,000 CFU/mL Corynebacterium species        01-07 @ 19:44 .Blood Blood     No growth at 5 days.          ========     EXAM:  CT CHEST                          PROCEDURE DATE:  01/17/2018          INTERPRETATION:  CLINICAL INFORMATION: fevers, hosp patient, dementia,   dyspnea, rule out pneumonia, sepsis    COMPARISON: None.    PROCEDURE:   CT of the Chest was performed without intravenous contrast.  Sagittal and coronal reformats were performed.      FINDINGS:    CHEST:     LUNGS AND LARGE AIRWAYS: Trace endobronchial mucous.  Bilateral perihilar   airspace opacities. Bibasilar atelectasis.  PLEURA: Trace bilateral pleural effusions.  VESSELS: Within normal limits.  HEART: Heart size is normal. No pericardial effusion.  MEDIASTINUM AND JANE: No lymphadenopathy.  CHEST WALL AND LOWER NECK: Within normal limits.  VISUALIZED UPPER ABDOMEN: Within normal limits.  BONES: Within normal limits.    IMPRESSION: Findings in the lungs as described. The differential includes   pulmonary edema and pneumonia.             JAKOB ESTRADA M.D., ATTENDING RADIOLOGIST  This document has been electronically signed. Jan 17 2018  1:12PM [facility]  INFECTIOUS DISEASES AND INTERNAL MEDICINE at [place]  =======================================================  Orestes March MD  Diplomates American Board of Internal Medicine and Infectious Diseases  =======================================================    JEFF HIPOLITO [id number]  chief complaint: follow up aspiration pneumonia  patient seen and examined in follow up.  Chart and labs reviewed.     resolution of leukocytosis.   =======================================================  Allergies:  latex (Unknown)  No Known Drug Allergies  shellfish (Hives; Rash)  shellfish (Other)    =======================================================  Medications:  acetaminophen  Suppository 650 milliGRAM(s) Rectal every 6 hours PRN  acetylcysteine 20% Inhalation 3 milliLiter(s) Inhalation every 8 hours  ALBUTerol    0.083% 2.5 milliGRAM(s) Nebulizer every 8 hours  amLODIPine   Tablet 5 milliGRAM(s) Oral once  amLODIPine   Tablet 5 milliGRAM(s) Oral daily  apixaban 2.5 milliGRAM(s) Oral every 12 hours  aspirin  chewable 81 milliGRAM(s) Oral daily  carvedilol 6.25 milliGRAM(s) Oral every 12 hours  donepezil 10 milliGRAM(s) Oral at bedtime  furosemide   Injectable 20 milliGRAM(s) IV Push once  hydrALAZINE Injectable 10 milliGRAM(s) IV Push every 2 hours PRN  levETIRAcetam 250 milliGRAM(s) Oral two times a day  levothyroxine 100 MICROGram(s) Oral daily  memantine 10 milliGRAM(s) Oral daily  pantoprazole    Tablet 40 milliGRAM(s) Oral before breakfast  piperacillin/tazobactam IVPB. 3.375 Gram(s) IV Intermittent every 8 hours    =======================================================  REVIEW OF SYSTEMS:    unable to obtain due to dementia    =======================================================  Physical Exam:    Vital Signs Last 24 Hrs  T(C): 36.7 (18 Jan 2018 20:00), Max: 36.7 (18 Jan 2018 20:00)  T(F): 98.1 (18 Jan 2018 20:00), Max: 98.1 (18 Jan 2018 20:00)  HR: 82 (19 Jan 2018 06:03) (59 - 92)  BP: 174/72 (19 Jan 2018 06:03) (111/55 - 174/72)  RR: 20 (18 Jan 2018 15:55) (20 - 20)  SpO2: 99% (19 Jan 2018 04:40) (97% - 99%)    GEN: NAD, pleasant, thin  HEENT: normocephalic and atraumatic. EOMI. KIRILL.    NECK: Supple. No carotid bruits.  No lymphadenopathy or thyromegaly.  LUNGS: diminished breath sounds at bases  HEART: Regular rate and rhythm without murmur.  ABDOMEN: Soft, nontender, and nondistended.  Positive bowel sounds.    : No CVA tenderness  EXTREMITIES: Without any cyanosis, clubbing, rash, lesions or edema.  MSK: no joint swelling  NEUROLOGIC: Cranial nerves II through XII are grossly intact.  KIN: No ulceration or induration present.    =======================================================    Labs:  01-19    145  |  103  |  14.0  ----------------------------<  83  4.0   |  28.0  |  1.04    Ca    8.6      19 Jan 2018 06:19  Mg     1.9     01-19    TPro  6.4<L>  /  Alb  2.7<L>  /  TBili  0.5  /  DBili  x   /  AST  40<H>  /  ALT  18  /  AlkPhos  96  01-19                          11.0   7.7   )-----------( 208      ( 19 Jan 2018 06:19 )             33.2           LIVER FUNCTIONS - ( 19 Jan 2018 06:19 )  Alb: 2.7 g/dL / Pro: 6.4 g/dL / ALK PHOS: 96 U/L / ALT: 18 U/L / AST: 40 U/L / GGT: x               CAPILLARY BLOOD GLUCOSE      POCT Blood Glucose.: 116 mg/dL (19 Jan 2018 08:20)  POCT Blood Glucose.: 137 mg/dL (18 Jan 2018 21:29)  POCT Blood Glucose.: 97 mg/dL (18 Jan 2018 16:34)  POCT Blood Glucose.: 88 mg/dL (18 Jan 2018 12:09)        RECENT CULTURES:  01-17 @ 13:12 .Urine Catheterized     No growth        01-17 @ 03:54 .Blood Blood     No growth at 48 hours        01-16 @ 02:10 .Blood Blood     No growth at 48 hours        01-16 @ 02:09 .Blood Blood-Peripheral     No growth at 48 hours        01-16 @ 00:39          NotDetec  01-08 @ 03:15 .Urine Catheterized     50,000 - 99,000 CFU/mL Corynebacterium species        01-07 @ 19:44 .Blood Blood     No growth at 5 days.          ========     EXAM:  CT CHEST                          PROCEDURE DATE:  01/17/2018          INTERPRETATION:  CLINICAL INFORMATION: fevers, hosp patient, dementia,   dyspnea, rule out pneumonia, sepsis    COMPARISON: None.    PROCEDURE:   CT of the Chest was performed without intravenous contrast.  Sagittal and coronal reformats were performed.      FINDINGS:    CHEST:     LUNGS AND LARGE AIRWAYS: Trace endobronchial mucous.  Bilateral perihilar   airspace opacities. Bibasilar atelectasis.  PLEURA: Trace bilateral pleural effusions.  VESSELS: Within normal limits.  HEART: Heart size is normal. No pericardial effusion.  MEDIASTINUM AND JANE: No lymphadenopathy.  CHEST WALL AND LOWER NECK: Within normal limits.  VISUALIZED UPPER ABDOMEN: Within normal limits.  BONES: Within normal limits.    IMPRESSION: Findings in the lungs as described. The differential includes   pulmonary edema and pneumonia.             JAKOB ESTRADA M.D., ATTENDING RADIOLOGIST  This document has been electronically signed. Jan 17 2018  1:12PM

## 2018-01-19 NOTE — PROGRESS NOTE ADULT - PROBLEM SELECTOR PLAN 2
- improving on zosyn  - day #4  - anticipate a 7 day course  - will need to arrange for 3 more days of IV antibiotics if discharged is planned.  PERIPHERAL LINE acceptable from ID point of view

## 2018-01-19 NOTE — DISCHARGE NOTE ADULT - CARE PLAN
Principal Discharge DX:	Sepsis, due to unspecified organism  Goal:	improved  Assessment and plan of treatment:	return to ER if recur  Secondary Diagnosis:	Urinary tract infection with hematuria, site unspecified  Assessment and plan of treatment:	resolved. drink atleast 2 liter of water.  Secondary Diagnosis:	Pneumonia of both lungs due to infectious organism, unspecified part of lung  Assessment and plan of treatment:	continue and finish antibiotics. Seek immediate medical attention if fever/worsening cough/Shrotness of breadth or any other concerning symptoms.  Secondary Diagnosis:	Protein calorie malnutrition  Assessment and plan of treatment:	continue diet as directed. follow speech pathology direction provided  Secondary Diagnosis:	Non-sustained ventricular tachycardia  Assessment and plan of treatment:	continue meds. see cardiology in 1 week  Secondary Diagnosis:	Essential hypertension  Assessment and plan of treatment:	continue meds. f/u with Primary care MD  Secondary Diagnosis:	Hypernatremia  Assessment and plan of treatment:	improved. drink 2-3 liter water atleast. get your primary Md recheck sodium in 1-2 weeks. for seizure and dementia. continue with keppra. see neurology in 2-3 weeks Principal Discharge DX:	Sepsis, due to unspecified organism  Goal:	improved  Assessment and plan of treatment:	return to ER if recur  Secondary Diagnosis:	Urinary tract infection with hematuria, site unspecified  Assessment and plan of treatment:	resolved. drink atleast 2 liter of water.  Secondary Diagnosis:	Pneumonia of both lungs due to infectious organism, unspecified part of lung  Assessment and plan of treatment:	continue and finish antibiotics. Seek immediate medical attention if fever/worsening cough/Shrotness of breadth or any other concerning symptoms.  Secondary Diagnosis:	Protein calorie malnutrition  Assessment and plan of treatment:	continue diet as directed. follow speech pathology direction provided  Secondary Diagnosis:	Non-sustained ventricular tachycardia  Assessment and plan of treatment:	continue meds. see cardiology in 1 week  Secondary Diagnosis:	Essential hypertension  Assessment and plan of treatment:	continue meds. f/u with Primary care MD  Secondary Diagnosis:	Hypernatremia  Goal:	with CKD stage 3a/A3: normal Creatinine. Hypernatremia improved  Assessment and plan of treatment:	drink atleast 2- 3 liter of water. get your primary Md recheck kidney function in 2 weeks. for seizure and dementia. continue with keppra. see neurology in 2-3 weeks Principal Discharge DX:	Sepsis, due to unspecified organism  Goal:	improved  Assessment and plan of treatment:	return to ER if recur  Secondary Diagnosis:	Urinary tract infection with hematuria, site unspecified  Assessment and plan of treatment:	resolved. drink atleast 2 liter of water.  Secondary Diagnosis:	Pneumonia of both lungs due to infectious organism, unspecified part of lung  Assessment and plan of treatment:	continue and finish antibiotics. Seek immediate medical attention if fever/worsening cough/Shrotness of breadth or any other concerning symptoms. CXR todays howed perihilar pulmonary edema. continue lasix, patient needs to folllow up with cardiology in 1 week.  Secondary Diagnosis:	Protein calorie malnutrition  Assessment and plan of treatment:	continue diet as directed. follow speech pathology direction provided  Secondary Diagnosis:	Non-sustained ventricular tachycardia  Assessment and plan of treatment:	continue meds. see cardiology in 1 week  Secondary Diagnosis:	Essential hypertension  Assessment and plan of treatment:	continue meds. f/u with Primary care MD  Secondary Diagnosis:	Hypernatremia  Goal:	with CKD stage 3a/A3: normal Creatinine. Hypernatremia improved  Assessment and plan of treatment:	drink atleast 2- 3 liter of water. get your primary Md recheck kidney function in 2 weeks. for seizure and dementia. continue with keppra. see neurology in 2-3 weeks Principal Discharge DX:	Sepsis, due to unspecified organism  Goal:	improved  Assessment and plan of treatment:	return to ER if recur  Secondary Diagnosis:	Urinary tract infection with hematuria, site unspecified  Assessment and plan of treatment:	resolved. drink atleast 2 liter of water.  Secondary Diagnosis:	Pneumonia of both lungs due to infectious organism, unspecified part of lung  Assessment and plan of treatment:	Completed antibiotics . Seek immediate medical attention if fever/worsening cough/Shrotness of breadth or any other concerning symptoms. CXR todays howed perihilar pulmonary edema.  patient needs to folllow up with cardiology in 1 week.  Secondary Diagnosis:	Protein calorie malnutrition  Assessment and plan of treatment:	continue diet as directed. follow speech pathology direction provided  Secondary Diagnosis:	Non-sustained ventricular tachycardia  Assessment and plan of treatment:	continue meds. see cardiology in 1 week  Secondary Diagnosis:	Essential hypertension  Assessment and plan of treatment:	continue meds. f/u with Primary care MD  Secondary Diagnosis:	Hypernatremia  Goal:	with CKD stage 3a/A3: normal Creatinine. Hypernatremia improved  Assessment and plan of treatment:	drink atleast 2- 3 liter of water. get your primary Md recheck kidney function in 2 weeks. for seizure and dementia. continue with keppra. see neurology in 2-3 weeks

## 2018-01-19 NOTE — PROGRESS NOTE ADULT - PROBLEM SELECTOR PROBLEM 10
Septic shock
Chronic kidney disease (CKD) stage G3a/A3, moderately decreased glomerular filtration rate (GFR) between 45-59 mL/min/1.73 square meter and albuminuria creatinine ratio greater than 300 mg/g
Septic shock
Chronic kidney disease (CKD) stage G3a/A3, moderately decreased glomerular filtration rate (GFR) between 45-59 mL/min/1.73 square meter and albuminuria creatinine ratio greater than 300 mg/g

## 2018-01-19 NOTE — PROGRESS NOTE ADULT - PROBLEM SELECTOR PLAN 10
resolved  urine Cx and antibiotics as above
resolved  urine Cx and antibiotics as above
water intake atleast 2-3L. creat normal
water intake atleast 2-3L. creat normal

## 2018-01-19 NOTE — PROGRESS NOTE ADULT - PROBLEM SELECTOR PLAN 1
possible GNR, on zosyn. hypoxemia currently better. possibly related to PNA. will not do CTA. on Eliquis. will not change rx. wbc trending down. UA neg for UTI. f/u ID. if ID clears, will DC to [facility] neg  CXr showed perihilar pul edema. add lasix

## 2018-01-19 NOTE — DISCHARGE NOTE ADULT - HOSPITAL COURSE
< from: CT Chest No Cont (01.17.18 @ 13:15) >  CHEST:     LUNGS AND LARGE AIRWAYS: Trace endobronchial mucous.  Bilateral perihilar   airspace opacities. Bibasilar atelectasis.  PLEURA: Trace bilateral pleural effusions.  VESSELS: Within normal limits.  HEART: Heart size is normal. No pericardial effusion.  MEDIASTINUM AND JANE: No lymphadenopathy.  CHEST WALL AND LOWER NECK: Within normal limits.  VISUALIZED UPPER ABDOMEN: Within normal limits.  BONES: Within normal limits.    IMPRESSION: Findings in the lungs as described. The differential includes   pulmonary edema and pneumonia.    < end of copied text >    < from: CT Head No Cont (01.07.18 @ 16:31) >  IMPRESSION:    No acute intracranial hemorrhage, mass effect, or evidence of acute   territorial infarct.   If clinical symptoms persist, recommend follow-up imaging with MRI brain   if there are no contraindications.    Dr. Dotson discussed the findings with Dr. Lazaro on January 7, 2018 at 4:33   PM.  Readback was obtained.      < end of copied text >    < from: TTE Echo Complete w/Doppler (01.09.18 @ 13:59) >     Summary:   1. Left ventricular ejection fraction, by visual estimation, is >75%.   2. Hyperdynamic global left ventricular systolic function.   3. The left ventricular diastolic function could not be assessed in this   study.   4. There is no evidence of pericardial effusion.   5. Sclerotic aortic valve with normal opening.   6. There is intracavitary gradient of 23 mm HG, probably secondary to   volume depletion with hyperdynamic left ventricle.  7. Normal right ventricular size and function.   8. Mild mitral valve regurgitation.    < end of copied text > The patient is a 74 year old female with a history of afib found unresponsive by family at home who attempted CPR for a few seconds. Patient admitted for encephalopathy, septic shock and UTI to the ICU on admission. NOted to have afib with RVR and loaded with digoxin. There was a suspicion that change in mentation could be due to underlying seizure. Was josue by neurology and continued on Keppra. Was started on IV zosyn for suspected aspiration pneumonia. Evaluated by ID, recommended 7 days of treatment with IV zosyn. Discussions with the family were done as well as involvement with palliative care, full code at this time.   On dysphagia diet per speech therapy- puree with honey liquids. Hypernatremia resolved;  Encourage oral fluid in take.  AFIB now rate controlled; on Eliquis for anticoagulation- per previous notes after discussion with the family regarding risk vs benefits patient's daughter wants her on anticoagulation     evaluated by PT- discharged to [facility] in stable condition. The family would like her to try [facility] in hopes of improvement and eventual discharge home.     40 mins spent coordinating care and discharge.

## 2018-01-20 LAB
ALBUMIN SERPL ELPH-MCNC: 2.9 G/DL — LOW (ref 3.3–5.2)
ALP SERPL-CCNC: 102 U/L — SIGNIFICANT CHANGE UP (ref 40–120)
ALT FLD-CCNC: 21 U/L — SIGNIFICANT CHANGE UP
ANION GAP SERPL CALC-SCNC: 17 MMOL/L — SIGNIFICANT CHANGE UP (ref 5–17)
AST SERPL-CCNC: 38 U/L — HIGH
BILIRUB SERPL-MCNC: 0.6 MG/DL — SIGNIFICANT CHANGE UP (ref 0.4–2)
BUN SERPL-MCNC: 13 MG/DL — SIGNIFICANT CHANGE UP (ref 8–20)
CALCIUM SERPL-MCNC: 9.2 MG/DL — SIGNIFICANT CHANGE UP (ref 8.6–10.2)
CHLORIDE SERPL-SCNC: 102 MMOL/L — SIGNIFICANT CHANGE UP (ref 98–107)
CO2 SERPL-SCNC: 28 MMOL/L — SIGNIFICANT CHANGE UP (ref 22–29)
CREAT SERPL-MCNC: 1.08 MG/DL — SIGNIFICANT CHANGE UP (ref 0.5–1.3)
GLUCOSE BLDC GLUCOMTR-MCNC: 107 MG/DL — HIGH (ref 70–99)
GLUCOSE BLDC GLUCOMTR-MCNC: 109 MG/DL — HIGH (ref 70–99)
GLUCOSE SERPL-MCNC: 92 MG/DL — SIGNIFICANT CHANGE UP (ref 70–115)
HCT VFR BLD CALC: 35.1 % — LOW (ref 37–47)
HGB BLD-MCNC: 11.4 G/DL — LOW (ref 12–16)
MCHC RBC-ENTMCNC: 30 PG — SIGNIFICANT CHANGE UP (ref 27–31)
MCHC RBC-ENTMCNC: 32.5 G/DL — SIGNIFICANT CHANGE UP (ref 32–36)
MCV RBC AUTO: 92.4 FL — SIGNIFICANT CHANGE UP (ref 81–99)
PLATELET # BLD AUTO: 270 K/UL — SIGNIFICANT CHANGE UP (ref 150–400)
POTASSIUM SERPL-MCNC: 3.4 MMOL/L — LOW (ref 3.5–5.3)
POTASSIUM SERPL-SCNC: 3.4 MMOL/L — LOW (ref 3.5–5.3)
PROT SERPL-MCNC: 7.3 G/DL — SIGNIFICANT CHANGE UP (ref 6.6–8.7)
RBC # BLD: 3.8 M/UL — LOW (ref 4.4–5.2)
RBC # FLD: 17 % — HIGH (ref 11–15.6)
SODIUM SERPL-SCNC: 147 MMOL/L — HIGH (ref 135–145)
WBC # BLD: 7.8 K/UL — SIGNIFICANT CHANGE UP (ref 4.8–10.8)
WBC # FLD AUTO: 7.8 K/UL — SIGNIFICANT CHANGE UP (ref 4.8–10.8)

## 2018-01-20 PROCEDURE — 99233 SBSQ HOSP IP/OBS HIGH 50: CPT

## 2018-01-20 PROCEDURE — 36000 PLACE NEEDLE IN VEIN: CPT

## 2018-01-20 RX ORDER — POTASSIUM CHLORIDE 20 MEQ
20 PACKET (EA) ORAL ONCE
Qty: 0 | Refills: 0 | Status: COMPLETED | OUTPATIENT
Start: 2018-01-20 | End: 2018-01-20

## 2018-01-20 RX ADMIN — PIPERACILLIN AND TAZOBACTAM 25 GRAM(S): 4; .5 INJECTION, POWDER, LYOPHILIZED, FOR SOLUTION INTRAVENOUS at 22:05

## 2018-01-20 RX ADMIN — LEVETIRACETAM 250 MILLIGRAM(S): 250 TABLET, FILM COATED ORAL at 07:03

## 2018-01-20 RX ADMIN — PIPERACILLIN AND TAZOBACTAM 25 GRAM(S): 4; .5 INJECTION, POWDER, LYOPHILIZED, FOR SOLUTION INTRAVENOUS at 00:05

## 2018-01-20 RX ADMIN — LEVETIRACETAM 250 MILLIGRAM(S): 250 TABLET, FILM COATED ORAL at 18:24

## 2018-01-20 RX ADMIN — Medication 20 MILLIEQUIVALENT(S): at 18:27

## 2018-01-20 RX ADMIN — MEMANTINE HYDROCHLORIDE 10 MILLIGRAM(S): 10 TABLET ORAL at 11:22

## 2018-01-20 RX ADMIN — Medication 100 MICROGRAM(S): at 07:04

## 2018-01-20 RX ADMIN — APIXABAN 2.5 MILLIGRAM(S): 2.5 TABLET, FILM COATED ORAL at 07:03

## 2018-01-20 RX ADMIN — CARVEDILOL PHOSPHATE 6.25 MILLIGRAM(S): 80 CAPSULE, EXTENDED RELEASE ORAL at 07:04

## 2018-01-20 RX ADMIN — Medication 20 MILLIGRAM(S): at 07:04

## 2018-01-20 RX ADMIN — ALBUTEROL 2.5 MILLIGRAM(S): 90 AEROSOL, METERED ORAL at 00:59

## 2018-01-20 RX ADMIN — DONEPEZIL HYDROCHLORIDE 10 MILLIGRAM(S): 10 TABLET, FILM COATED ORAL at 00:06

## 2018-01-20 RX ADMIN — PANTOPRAZOLE SODIUM 40 MILLIGRAM(S): 20 TABLET, DELAYED RELEASE ORAL at 07:04

## 2018-01-20 RX ADMIN — DONEPEZIL HYDROCHLORIDE 10 MILLIGRAM(S): 10 TABLET, FILM COATED ORAL at 22:05

## 2018-01-20 RX ADMIN — ALBUTEROL 2.5 MILLIGRAM(S): 90 AEROSOL, METERED ORAL at 09:19

## 2018-01-20 RX ADMIN — Medication 3 MILLILITER(S): at 00:59

## 2018-01-20 RX ADMIN — PIPERACILLIN AND TAZOBACTAM 25 GRAM(S): 4; .5 INJECTION, POWDER, LYOPHILIZED, FOR SOLUTION INTRAVENOUS at 11:22

## 2018-01-20 RX ADMIN — APIXABAN 2.5 MILLIGRAM(S): 2.5 TABLET, FILM COATED ORAL at 18:24

## 2018-01-20 RX ADMIN — ALBUTEROL 2.5 MILLIGRAM(S): 90 AEROSOL, METERED ORAL at 15:02

## 2018-01-20 RX ADMIN — Medication 3 MILLILITER(S): at 09:20

## 2018-01-20 RX ADMIN — CARVEDILOL PHOSPHATE 6.25 MILLIGRAM(S): 80 CAPSULE, EXTENDED RELEASE ORAL at 18:24

## 2018-01-20 RX ADMIN — Medication 81 MILLIGRAM(S): at 11:22

## 2018-01-20 RX ADMIN — PIPERACILLIN AND TAZOBACTAM 25 GRAM(S): 4; .5 INJECTION, POWDER, LYOPHILIZED, FOR SOLUTION INTRAVENOUS at 07:03

## 2018-01-20 NOTE — PROGRESS NOTE ADULT - ASSESSMENT
The patient is a 74 year old female with a history of afib found unresponsive by family at home who attempted CPR for a few seconds. Patient admitted for encephalopathy, septic shock and UTI to the ICU on admission. NOted to have afib with RVR and loaded with digoxin. There was a suspicion that change in mentation could be due to underlying seizure. Was josue by neurology and continued on Keppra. Was started on IV zosyn for suspected aspiration pneumonia. Evaluated by ID, recommended 7 days of treatment with IV zosyn. Discussions with the family were done as well as involvement with palliative care, full code at this time.     Assessment/Plan:    1. Septic Shock on admission secondary to UTI: Now resolved.     2. Aspiration pnemonia: Day 6/7 of IV zosyn to be completed  On dysphagia diet per speech therapy- puree with honey liquids    3. Hypernatremia: Encourage oral fluid in take. MOnitor bmp    4. Hypokalemia: Replace kcl    5. AFIB now rate controlled; on eliquis for anticoagulation- per previous notes after discussion with the family regarding risk vs benefits patient's daughter wants her on anticogulation.     6. Dementia    7. Seizure disorder on keppra    8. Toxic metatoblic encephalopathy on admission secondary to septic shock now improved to baseline mentation    Pending massiel upon completion of antibiotics. Discussed with Social work.

## 2018-01-20 NOTE — PROGRESS NOTE ADULT - SUBJECTIVE AND OBJECTIVE BOX
CC: AMS    INTERVAL HPI/OVERNIGHT EVENTS: Patient seen and examined, no acute events overnight. Patient laying in bed awake minimal verbal responses.       Vital Signs Last 24 Hrs  T(C): 37 (20 Jan 2018 09:26), Max: 37 (20 Jan 2018 09:26)  T(F): 98.6 (20 Jan 2018 09:26), Max: 98.6 (20 Jan 2018 09:26)  HR: 72 (20 Jan 2018 09:26) (63 - 72)  BP: 130/60 (20 Jan 2018 09:26) (94/54 - 130/60)  BP(mean): --  RR: 18 (20 Jan 2018 09:26) (18 - 18)  SpO2: 98% (19 Jan 2018 16:58) (96% - 98%)    PHYSICAL EXAM:    GENERAL: NAD  NECK: Supple, No JVD  CHEST/LUNG: Clear to auscultation bilaterally; No rales, rhonchi, wheezing, or rubs  HEART: Regular rate and rhythm; No murmurs, rubs, or gallops  ABDOMEN: Soft, Nontender, Nondistended; Bowel sounds present  EXTREMITIES:  2+ Peripheral Pulses, No clubbing, cyanosis, or edema        MEDICATIONS  (STANDING):  acetylcysteine 20% Inhalation 3 milliLiter(s) Inhalation every 8 hours  ALBUTerol    0.083% 2.5 milliGRAM(s) Nebulizer every 8 hours  amLODIPine   Tablet 5 milliGRAM(s) Oral once  apixaban 2.5 milliGRAM(s) Oral every 12 hours  aspirin  chewable 81 milliGRAM(s) Oral daily  carvedilol 6.25 milliGRAM(s) Oral every 12 hours  donepezil 10 milliGRAM(s) Oral at bedtime  furosemide    Tablet 20 milliGRAM(s) Oral daily  levETIRAcetam 250 milliGRAM(s) Oral two times a day  levothyroxine 100 MICROGram(s) Oral daily  memantine 10 milliGRAM(s) Oral daily  pantoprazole    Tablet 40 milliGRAM(s) Oral before breakfast  piperacillin/tazobactam IVPB. 3.375 Gram(s) IV Intermittent every 8 hours  potassium chloride    Tablet ER 20 milliEquivalent(s) Oral once    MEDICATIONS  (PRN):  acetaminophen  Suppository 650 milliGRAM(s) Rectal every 6 hours PRN For Temp greater than 38 C (100.4 F)  hydrALAZINE Injectable 10 milliGRAM(s) IV Push every 2 hours PRN SBP> 160      Allergies    latex (Unknown)  No Known Drug Allergies  shellfish (Hives; Rash)  shellfish (Other)    Intolerances          LABS:                          11.4   7.8   )-----------( 270      ( 20 Jan 2018 06:25 )             35.1     01-20    147<H>  |  102  |  13.0  ----------------------------<  92  3.4<L>   |  28.0  |  1.08    Ca    9.2      20 Jan 2018 06:25  Mg     1.9     01-19    TPro  7.3  /  Alb  2.9<L>  /  TBili  0.6  /  DBili  x   /  AST  38<H>  /  ALT  21  /  AlkPhos  102  01-20          RADIOLOGY & ADDITIONAL TESTS:

## 2018-01-21 LAB
ANION GAP SERPL CALC-SCNC: 14 MMOL/L — SIGNIFICANT CHANGE UP (ref 5–17)
BUN SERPL-MCNC: 9 MG/DL — SIGNIFICANT CHANGE UP (ref 8–20)
CALCIUM SERPL-MCNC: 8.9 MG/DL — SIGNIFICANT CHANGE UP (ref 8.6–10.2)
CHLORIDE SERPL-SCNC: 106 MMOL/L — SIGNIFICANT CHANGE UP (ref 98–107)
CO2 SERPL-SCNC: 27 MMOL/L — SIGNIFICANT CHANGE UP (ref 22–29)
CREAT SERPL-MCNC: 0.93 MG/DL — SIGNIFICANT CHANGE UP (ref 0.5–1.3)
CULTURE RESULTS: SIGNIFICANT CHANGE UP
CULTURE RESULTS: SIGNIFICANT CHANGE UP
GLUCOSE SERPL-MCNC: 78 MG/DL — SIGNIFICANT CHANGE UP (ref 70–115)
POTASSIUM SERPL-MCNC: 4 MMOL/L — SIGNIFICANT CHANGE UP (ref 3.5–5.3)
POTASSIUM SERPL-SCNC: 4 MMOL/L — SIGNIFICANT CHANGE UP (ref 3.5–5.3)
SODIUM SERPL-SCNC: 147 MMOL/L — HIGH (ref 135–145)
SPECIMEN SOURCE: SIGNIFICANT CHANGE UP
SPECIMEN SOURCE: SIGNIFICANT CHANGE UP

## 2018-01-21 PROCEDURE — 99233 SBSQ HOSP IP/OBS HIGH 50: CPT

## 2018-01-21 RX ADMIN — PIPERACILLIN AND TAZOBACTAM 25 GRAM(S): 4; .5 INJECTION, POWDER, LYOPHILIZED, FOR SOLUTION INTRAVENOUS at 12:26

## 2018-01-21 RX ADMIN — Medication 100 MICROGRAM(S): at 07:13

## 2018-01-21 RX ADMIN — Medication 20 MILLIGRAM(S): at 07:13

## 2018-01-21 RX ADMIN — PANTOPRAZOLE SODIUM 40 MILLIGRAM(S): 20 TABLET, DELAYED RELEASE ORAL at 07:13

## 2018-01-21 RX ADMIN — Medication 81 MILLIGRAM(S): at 12:24

## 2018-01-21 RX ADMIN — LEVETIRACETAM 250 MILLIGRAM(S): 250 TABLET, FILM COATED ORAL at 07:13

## 2018-01-21 RX ADMIN — DONEPEZIL HYDROCHLORIDE 10 MILLIGRAM(S): 10 TABLET, FILM COATED ORAL at 23:35

## 2018-01-21 RX ADMIN — ALBUTEROL 2.5 MILLIGRAM(S): 90 AEROSOL, METERED ORAL at 08:39

## 2018-01-21 RX ADMIN — MEMANTINE HYDROCHLORIDE 10 MILLIGRAM(S): 10 TABLET ORAL at 12:24

## 2018-01-21 RX ADMIN — LEVETIRACETAM 250 MILLIGRAM(S): 250 TABLET, FILM COATED ORAL at 18:22

## 2018-01-21 RX ADMIN — ALBUTEROL 2.5 MILLIGRAM(S): 90 AEROSOL, METERED ORAL at 14:58

## 2018-01-21 RX ADMIN — CARVEDILOL PHOSPHATE 6.25 MILLIGRAM(S): 80 CAPSULE, EXTENDED RELEASE ORAL at 07:13

## 2018-01-21 RX ADMIN — APIXABAN 2.5 MILLIGRAM(S): 2.5 TABLET, FILM COATED ORAL at 18:22

## 2018-01-21 RX ADMIN — CARVEDILOL PHOSPHATE 6.25 MILLIGRAM(S): 80 CAPSULE, EXTENDED RELEASE ORAL at 18:22

## 2018-01-21 RX ADMIN — APIXABAN 2.5 MILLIGRAM(S): 2.5 TABLET, FILM COATED ORAL at 07:13

## 2018-01-21 RX ADMIN — PIPERACILLIN AND TAZOBACTAM 25 GRAM(S): 4; .5 INJECTION, POWDER, LYOPHILIZED, FOR SOLUTION INTRAVENOUS at 23:34

## 2018-01-21 NOTE — PROGRESS NOTE ADULT - SUBJECTIVE AND OBJECTIVE BOX
CC: SOB    INTERVAL HPI/OVERNIGHT EVENTS: Patient seen and examined, pulled out her IV this morning. Was replaced by PA. No urinary or bowel complaints.       Vital Signs Last 24 Hrs  T(C): 36.8 (21 Jan 2018 06:17), Max: 36.8 (21 Jan 2018 06:17)  T(F): 98.2 (21 Jan 2018 06:17), Max: 98.2 (21 Jan 2018 06:17)  HR: 70 (21 Jan 2018 06:17) (70 - 75)  BP: 148/74 (21 Jan 2018 06:17) (133/76 - 148/74)  BP(mean): --  RR: 18 (20 Jan 2018 20:16) (18 - 18)  SpO2: 98% (21 Jan 2018 08:41) (98% - 98%)    PHYSICAL EXAM:    GENERAL: NAD  CHEST/LUNG: Bilateral rhonchi   HEART: Regular rate and rhythm; No murmurs, rubs, or gallops  ABDOMEN: Soft, Nontender, Nondistended; Bowel sounds present  EXTREMITIES:  2+ Peripheral Pulses, No clubbing, cyanosis, or edema        MEDICATIONS  (STANDING):  ALBUTerol    0.083% 2.5 milliGRAM(s) Nebulizer every 8 hours  amLODIPine   Tablet 5 milliGRAM(s) Oral once  apixaban 2.5 milliGRAM(s) Oral every 12 hours  aspirin  chewable 81 milliGRAM(s) Oral daily  carvedilol 6.25 milliGRAM(s) Oral every 12 hours  donepezil 10 milliGRAM(s) Oral at bedtime  levETIRAcetam 250 milliGRAM(s) Oral two times a day  levothyroxine 100 MICROGram(s) Oral daily  memantine 10 milliGRAM(s) Oral daily  pantoprazole    Tablet 40 milliGRAM(s) Oral before breakfast  piperacillin/tazobactam IVPB. 3.375 Gram(s) IV Intermittent every 8 hours    MEDICATIONS  (PRN):  acetaminophen  Suppository 650 milliGRAM(s) Rectal every 6 hours PRN For Temp greater than 38 C (100.4 F)  hydrALAZINE Injectable 10 milliGRAM(s) IV Push every 2 hours PRN SBP> 160      Allergies    latex (Unknown)  No Known Drug Allergies  shellfish (Hives; Rash)  shellfish (Other)    Intolerances          LABS:                          11.4   7.8   )-----------( 270      ( 20 Jan 2018 06:25 )             35.1     01-21    147<H>  |  106  |  9.0  ----------------------------<  78  4.0   |  27.0  |  0.93    Ca    8.9      21 Jan 2018 06:29    TPro  7.3  /  Alb  2.9<L>  /  TBili  0.6  /  DBili  x   /  AST  38<H>  /  ALT  21  /  AlkPhos  102  01-20          RADIOLOGY & ADDITIONAL TESTS:

## 2018-01-21 NOTE — PROCEDURE NOTE - NSPROCDETAILS_GEN_ALL_CORE
location identified, draped/prepped, sterile technique used/blood seen on insertion/flushes easily/dressing applied/secured in place/ultrasound utilization
dressing applied/flushes easily/secured in place/sterile technique, catheter placed/blood seen on insertion/location identified, draped/prepped, sterile technique used
guidewire recovered/lumen(s) aspirated and flushed/sterile technique, catheter placed/sterile dressing applied/ultrasound guidance

## 2018-01-21 NOTE — PROGRESS NOTE ADULT - ASSESSMENT
The patient is a 74 year old female with a history of afib found unresponsive by family at home who attempted CPR for a few seconds. Patient admitted for encephalopathy, septic shock and UTI to the ICU on admission. NOted to have afib with RVR and loaded with digoxin. There was a suspicion that change in mentation could be due to underlying seizure. Was josue by neurology and continued on Keppra. Was started on IV zosyn for suspected aspiration pneumonia. Evaluated by ID, recommended 7 days of treatment with IV zosyn. Discussions with the family were done as well as involvement with palliative care, full code at this time.     Assessment/Plan:    1. Septic Shock on admission secondary to UTI: Now resolved.     2. Aspiration pnemonia: Day 7/7 of IV zosyn to be completed  On dysphagia diet per speech therapy- puree with honey liquids    3. Hypernatremia: Encourage oral fluid in take. Monitor bmp. hold Lasix for today     4. Hypokalemia: repleted     5. AFIB now rate controlled; on Eliquis for anticoagulation- per previous notes after discussion with the family regarding risk vs benefits patient's daughter wants her on anticoagulation     6. Dementia    7. Seizure disorder on Keppra    8. Toxic metabolic encephalopathy on admission secondary to septic shock now improved to baseline mentation    Pending massiel upon completion of antibiotics. Discussed with Social work.

## 2018-01-21 NOTE — PROCEDURE NOTE - NSPOSTCAREGUIDE_GEN_A_CORE
Care for catheter as per unit/ICU protocols/Keep the cast/splint/dressing clean and dry
Verbal/written post procedure instructions were given to patient/caregiver
Care for catheter as per unit/ICU protocols

## 2018-01-21 NOTE — PROCEDURE NOTE - NSINFORMCONSENT_GEN_A_CORE
Benefits, risks, and possible complications of procedure explained to patient/caregiver who verbalized understanding and gave verbal consent.
Benefits, risks, and possible complications of procedure explained to patient/caregiver who verbalized understanding and gave written consent.
Benefits, risks, and possible complications of procedure explained to patient/caregiver who verbalized understanding and gave verbal consent.

## 2018-01-22 VITALS
TEMPERATURE: 99 F | DIASTOLIC BLOOD PRESSURE: 60 MMHG | OXYGEN SATURATION: 93 % | HEART RATE: 76 BPM | RESPIRATION RATE: 18 BRPM | SYSTOLIC BLOOD PRESSURE: 150 MMHG

## 2018-01-22 LAB
ANION GAP SERPL CALC-SCNC: 14 MMOL/L — SIGNIFICANT CHANGE UP (ref 5–17)
BUN SERPL-MCNC: 6 MG/DL — LOW (ref 8–20)
CALCIUM SERPL-MCNC: 8.4 MG/DL — LOW (ref 8.6–10.2)
CHLORIDE SERPL-SCNC: 102 MMOL/L — SIGNIFICANT CHANGE UP (ref 98–107)
CO2 SERPL-SCNC: 28 MMOL/L — SIGNIFICANT CHANGE UP (ref 22–29)
CREAT SERPL-MCNC: 0.91 MG/DL — SIGNIFICANT CHANGE UP (ref 0.5–1.3)
CULTURE RESULTS: SIGNIFICANT CHANGE UP
CULTURE RESULTS: SIGNIFICANT CHANGE UP
GLUCOSE SERPL-MCNC: 74 MG/DL — SIGNIFICANT CHANGE UP (ref 70–115)
POTASSIUM SERPL-MCNC: 3 MMOL/L — LOW (ref 3.5–5.3)
POTASSIUM SERPL-SCNC: 3 MMOL/L — LOW (ref 3.5–5.3)
SODIUM SERPL-SCNC: 144 MMOL/L — SIGNIFICANT CHANGE UP (ref 135–145)
SPECIMEN SOURCE: SIGNIFICANT CHANGE UP
SPECIMEN SOURCE: SIGNIFICANT CHANGE UP

## 2018-01-22 PROCEDURE — 82330 ASSAY OF CALCIUM: CPT

## 2018-01-22 PROCEDURE — 82435 ASSAY OF BLOOD CHLORIDE: CPT

## 2018-01-22 PROCEDURE — 83605 ASSAY OF LACTIC ACID: CPT

## 2018-01-22 PROCEDURE — 83735 ASSAY OF MAGNESIUM: CPT

## 2018-01-22 PROCEDURE — 82550 ASSAY OF CK (CPK): CPT

## 2018-01-22 PROCEDURE — 70450 CT HEAD/BRAIN W/O DYE: CPT

## 2018-01-22 PROCEDURE — 97530 THERAPEUTIC ACTIVITIES: CPT

## 2018-01-22 PROCEDURE — 82962 GLUCOSE BLOOD TEST: CPT

## 2018-01-22 PROCEDURE — 86900 BLOOD TYPING SEROLOGIC ABO: CPT

## 2018-01-22 PROCEDURE — 82310 ASSAY OF CALCIUM: CPT

## 2018-01-22 PROCEDURE — 82803 BLOOD GASES ANY COMBINATION: CPT

## 2018-01-22 PROCEDURE — 36415 COLL VENOUS BLD VENIPUNCTURE: CPT

## 2018-01-22 PROCEDURE — 87633 RESP VIRUS 12-25 TARGETS: CPT

## 2018-01-22 PROCEDURE — 86901 BLOOD TYPING SEROLOGIC RH(D): CPT

## 2018-01-22 PROCEDURE — 71045 X-RAY EXAM CHEST 1 VIEW: CPT

## 2018-01-22 PROCEDURE — 84132 ASSAY OF SERUM POTASSIUM: CPT

## 2018-01-22 PROCEDURE — 86850 RBC ANTIBODY SCREEN: CPT

## 2018-01-22 PROCEDURE — 85610 PROTHROMBIN TIME: CPT

## 2018-01-22 PROCEDURE — 82947 ASSAY GLUCOSE BLOOD QUANT: CPT

## 2018-01-22 PROCEDURE — 96375 TX/PRO/DX INJ NEW DRUG ADDON: CPT | Mod: XU

## 2018-01-22 PROCEDURE — 84100 ASSAY OF PHOSPHORUS: CPT

## 2018-01-22 PROCEDURE — 94640 AIRWAY INHALATION TREATMENT: CPT

## 2018-01-22 PROCEDURE — 97110 THERAPEUTIC EXERCISES: CPT

## 2018-01-22 PROCEDURE — 74230 X-RAY XM SWLNG FUNCJ C+: CPT

## 2018-01-22 PROCEDURE — 87086 URINE CULTURE/COLONY COUNT: CPT

## 2018-01-22 PROCEDURE — 92611 MOTION FLUOROSCOPY/SWALLOW: CPT

## 2018-01-22 PROCEDURE — 80162 ASSAY OF DIGOXIN TOTAL: CPT

## 2018-01-22 PROCEDURE — 99292 CRITICAL CARE ADDL 30 MIN: CPT | Mod: 25

## 2018-01-22 PROCEDURE — 85730 THROMBOPLASTIN TIME PARTIAL: CPT

## 2018-01-22 PROCEDURE — 84484 ASSAY OF TROPONIN QUANT: CPT

## 2018-01-22 PROCEDURE — 71250 CT THORAX DX C-: CPT

## 2018-01-22 PROCEDURE — 80053 COMPREHEN METABOLIC PANEL: CPT

## 2018-01-22 PROCEDURE — 97163 PT EVAL HIGH COMPLEX 45 MIN: CPT

## 2018-01-22 PROCEDURE — 87486 CHLMYD PNEUM DNA AMP PROBE: CPT

## 2018-01-22 PROCEDURE — 96374 THER/PROPH/DIAG INJ IV PUSH: CPT | Mod: XU

## 2018-01-22 PROCEDURE — 85027 COMPLETE CBC AUTOMATED: CPT

## 2018-01-22 PROCEDURE — 99239 HOSP IP/OBS DSCHRG MGMT >30: CPT

## 2018-01-22 PROCEDURE — 95819 EEG AWAKE AND ASLEEP: CPT

## 2018-01-22 PROCEDURE — 97116 GAIT TRAINING THERAPY: CPT

## 2018-01-22 PROCEDURE — 93005 ELECTROCARDIOGRAM TRACING: CPT

## 2018-01-22 PROCEDURE — 36600 WITHDRAWAL OF ARTERIAL BLOOD: CPT

## 2018-01-22 PROCEDURE — 87040 BLOOD CULTURE FOR BACTERIA: CPT

## 2018-01-22 PROCEDURE — 87798 DETECT AGENT NOS DNA AMP: CPT

## 2018-01-22 PROCEDURE — 85014 HEMATOCRIT: CPT

## 2018-01-22 PROCEDURE — 51702 INSERT TEMP BLADDER CATH: CPT

## 2018-01-22 PROCEDURE — 83970 ASSAY OF PARATHORMONE: CPT

## 2018-01-22 PROCEDURE — 94760 N-INVAS EAR/PLS OXIMETRY 1: CPT

## 2018-01-22 PROCEDURE — 92610 EVALUATE SWALLOWING FUNCTION: CPT

## 2018-01-22 PROCEDURE — 84443 ASSAY THYROID STIM HORMONE: CPT

## 2018-01-22 PROCEDURE — 93306 TTE W/DOPPLER COMPLETE: CPT

## 2018-01-22 PROCEDURE — 99291 CRITICAL CARE FIRST HOUR: CPT | Mod: 25

## 2018-01-22 PROCEDURE — 81001 URINALYSIS AUTO W/SCOPE: CPT

## 2018-01-22 PROCEDURE — 84295 ASSAY OF SERUM SODIUM: CPT

## 2018-01-22 PROCEDURE — 87581 M.PNEUMON DNA AMP PROBE: CPT

## 2018-01-22 PROCEDURE — 80048 BASIC METABOLIC PNL TOTAL CA: CPT

## 2018-01-22 RX ORDER — POTASSIUM CHLORIDE 20 MEQ
40 PACKET (EA) ORAL ONCE
Qty: 0 | Refills: 0 | Status: COMPLETED | OUTPATIENT
Start: 2018-01-22 | End: 2018-01-22

## 2018-01-22 RX ADMIN — ALBUTEROL 2.5 MILLIGRAM(S): 90 AEROSOL, METERED ORAL at 17:10

## 2018-01-22 RX ADMIN — MEMANTINE HYDROCHLORIDE 10 MILLIGRAM(S): 10 TABLET ORAL at 11:28

## 2018-01-22 RX ADMIN — DONEPEZIL HYDROCHLORIDE 10 MILLIGRAM(S): 10 TABLET, FILM COATED ORAL at 21:11

## 2018-01-22 RX ADMIN — PANTOPRAZOLE SODIUM 40 MILLIGRAM(S): 20 TABLET, DELAYED RELEASE ORAL at 06:00

## 2018-01-22 RX ADMIN — PIPERACILLIN AND TAZOBACTAM 25 GRAM(S): 4; .5 INJECTION, POWDER, LYOPHILIZED, FOR SOLUTION INTRAVENOUS at 05:58

## 2018-01-22 RX ADMIN — ALBUTEROL 2.5 MILLIGRAM(S): 90 AEROSOL, METERED ORAL at 08:18

## 2018-01-22 RX ADMIN — Medication 40 MILLIEQUIVALENT(S): at 11:28

## 2018-01-22 RX ADMIN — Medication 81 MILLIGRAM(S): at 11:28

## 2018-01-22 RX ADMIN — APIXABAN 2.5 MILLIGRAM(S): 2.5 TABLET, FILM COATED ORAL at 05:59

## 2018-01-22 RX ADMIN — CARVEDILOL PHOSPHATE 6.25 MILLIGRAM(S): 80 CAPSULE, EXTENDED RELEASE ORAL at 05:59

## 2018-01-22 RX ADMIN — APIXABAN 2.5 MILLIGRAM(S): 2.5 TABLET, FILM COATED ORAL at 18:19

## 2018-01-22 RX ADMIN — CARVEDILOL PHOSPHATE 6.25 MILLIGRAM(S): 80 CAPSULE, EXTENDED RELEASE ORAL at 18:19

## 2018-01-22 RX ADMIN — LEVETIRACETAM 250 MILLIGRAM(S): 250 TABLET, FILM COATED ORAL at 06:00

## 2018-01-22 RX ADMIN — LEVETIRACETAM 250 MILLIGRAM(S): 250 TABLET, FILM COATED ORAL at 18:19

## 2018-01-22 RX ADMIN — Medication 100 MICROGRAM(S): at 05:59

## 2018-01-22 NOTE — PROGRESS NOTE ADULT - ASSESSMENT
The patient is a 74 year old female with a history of afib found unresponsive by family at home who attempted CPR for a few seconds. Patient admitted for encephalopathy, septic shock and UTI to the ICU on admission. NOted to have afib with RVR and loaded with digoxin. There was a suspicion that change in mentation could be due to underlying seizure. Was josue by neurology and continued on Keppra. Was started on IV zosyn for suspected aspiration pneumonia. Evaluated by ID, recommended 7 days of treatment with IV zosyn. Discussions with the family were done as well as involvement with palliative care, full code at this time.     Assessment/Plan:    1. Septic Shock on admission secondary to UTI: Now resolved.     2. Aspiration pnemonia: Completed 7 days of iv zosyn  On dysphagia diet per speech therapy- puree with honey liquids    3. Hypernatremia: resolved continue to Encourage oral fluid in take.     4. Hypokalemia: repleted     5. AFIB now rate controlled; on Eliquis for anticoagulation- per previous notes after discussion with the family regarding risk vs benefits patient's daughter wants her on anticoagulation     6. Dementia    7. Seizure disorder on Keppra    8. Toxic metabolic encephalopathy on admission secondary to septic shock now improved to baseline mentation    Stable for discharge to [facility]. Discussed with social work.

## 2018-01-22 NOTE — PROGRESS NOTE ADULT - PROVIDER SPECIALTY LIST ADULT
Cardiology
Critical Care
Hospitalist
Infectious Disease
Neurology
Palliative Care
Palliative Care
Hospitalist
Hospitalist
Critical Care
Hospitalist

## 2018-01-22 NOTE — PROGRESS NOTE ADULT - SUBJECTIVE AND OBJECTIVE BOX
CC: SOB    INTERVAL HPI/OVERNIGHT EVENTS: Patient seen and examined,, no acute complaints overnight. Laying comfortably in bed.       Vital Signs Last 24 Hrs  T(C): 37 (22 Jan 2018 05:49), Max: 37 (21 Jan 2018 22:00)  T(F): 98.6 (22 Jan 2018 05:49), Max: 98.6 (21 Jan 2018 22:00)  HR: 79 (22 Jan 2018 05:49) (70 - 79)  BP: 152/72 (22 Jan 2018 05:49) (122/61 - 152/72)  BP(mean): --  RR: 18 (22 Jan 2018 05:49) (18 - 20)  SpO2: 92% (22 Jan 2018 08:18) (92% - 96%)    PHYSICAL EXAM:    GENERAL: NAD  CHEST/LUNG: Clear to auscultation bilaterally; No rales, rhonchi, wheezing, or rubs  HEART: Regular rate and rhythm; No murmurs, rubs, or gallops  ABDOMEN: Soft, Nontender, Nondistended; Bowel sounds present  EXTREMITIES:  2+ Peripheral Pulses, No clubbing, cyanosis, or edema        MEDICATIONS  (STANDING):  ALBUTerol    0.083% 2.5 milliGRAM(s) Nebulizer every 8 hours  amLODIPine   Tablet 5 milliGRAM(s) Oral once  apixaban 2.5 milliGRAM(s) Oral every 12 hours  aspirin  chewable 81 milliGRAM(s) Oral daily  carvedilol 6.25 milliGRAM(s) Oral every 12 hours  donepezil 10 milliGRAM(s) Oral at bedtime  levETIRAcetam 250 milliGRAM(s) Oral two times a day  levothyroxine 100 MICROGram(s) Oral daily  memantine 10 milliGRAM(s) Oral daily  pantoprazole    Tablet 40 milliGRAM(s) Oral before breakfast    MEDICATIONS  (PRN):  acetaminophen  Suppository 650 milliGRAM(s) Rectal every 6 hours PRN For Temp greater than 38 C (100.4 F)  hydrALAZINE Injectable 10 milliGRAM(s) IV Push every 2 hours PRN SBP> 160      Allergies    latex (Unknown)  No Known Drug Allergies  shellfish (Hives; Rash)  shellfish (Other)    Intolerances          LABS:      01-22    144  |  102  |  6.0<L>  ----------------------------<  74  3.0<L>   |  28.0  |  0.91    Ca    8.4<L>      22 Jan 2018 05:54            RADIOLOGY & ADDITIONAL TESTS:

## 2018-03-28 NOTE — ED ADULT NURSE NOTE - CHIEF COMPLAINT QUOTE
pt BIBA aphasic with right side facial droop, LKW 15:00. EMS reports patient was witnessed to have passed out at 15:00, became pulseless and CPR initiated for appx 30 seconds by [facility]. pt is awake on arrival, unable to follow commands, hypotensive 78/50 in the field with BS 74 as well, ER MD at bedside and code stroke activated 10

## 2018-06-14 ENCOUNTER — EMERGENCY (EMERGENCY)
Facility: HOSPITAL | Age: 83
LOS: 1 days | Discharge: HOME | End: 2018-06-14
Attending: EMERGENCY MEDICINE
Payer: MEDICARE

## 2018-06-14 VITALS
OXYGEN SATURATION: 99 % | DIASTOLIC BLOOD PRESSURE: 99 MMHG | RESPIRATION RATE: 18 BRPM | SYSTOLIC BLOOD PRESSURE: 154 MMHG | TEMPERATURE: 98 F | HEART RATE: 92 BPM
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
## 2018-06-14 PROBLEM — I48.91 UNSPECIFIED ATRIAL FIBRILLATION: Chronic | Status: ACTIVE | Noted: 2018-01-07

## 2018-06-14 PROBLEM — C50.919 MALIGNANT NEOPLASM OF UNSPECIFIED SITE OF UNSPECIFIED FEMALE BREAST: Chronic | Status: ACTIVE | Noted: 2018-01-07

## 2018-06-14 PROBLEM — I34.1 NONRHEUMATIC MITRAL (VALVE) PROLAPSE: Chronic | Status: ACTIVE | Noted: 2018-01-07

## 2018-06-14 LAB
ALBUMIN SERPL ELPH-MCNC: 3.6 G/DL — SIGNIFICANT CHANGE UP (ref 3.3–5.2)
ALP SERPL-CCNC: 128 U/L — HIGH (ref 40–120)
ALT FLD-CCNC: 14 U/L — SIGNIFICANT CHANGE UP
ANION GAP SERPL CALC-SCNC: 12 MMOL/L — SIGNIFICANT CHANGE UP (ref 5–17)
APPEARANCE UR: CLEAR — SIGNIFICANT CHANGE UP
AST SERPL-CCNC: 27 U/L — SIGNIFICANT CHANGE UP
BASOPHILS # BLD AUTO: 0 K/UL — SIGNIFICANT CHANGE UP (ref 0–0.2)
BASOPHILS NFR BLD AUTO: 0.4 % — SIGNIFICANT CHANGE UP (ref 0–2)
BILIRUB SERPL-MCNC: <0.2 MG/DL — LOW (ref 0.4–2)
BILIRUB UR-MCNC: NEGATIVE — SIGNIFICANT CHANGE UP
BLD GP AB SCN SERPL QL: SIGNIFICANT CHANGE UP
BUN SERPL-MCNC: 19 MG/DL — SIGNIFICANT CHANGE UP (ref 8–20)
CALCIUM SERPL-MCNC: 9.5 MG/DL — SIGNIFICANT CHANGE UP (ref 8.6–10.2)
CHLORIDE SERPL-SCNC: 102 MMOL/L — SIGNIFICANT CHANGE UP (ref 98–107)
CO2 SERPL-SCNC: 24 MMOL/L — SIGNIFICANT CHANGE UP (ref 22–29)
COLOR SPEC: YELLOW — SIGNIFICANT CHANGE UP
CREAT SERPL-MCNC: 1.07 MG/DL — SIGNIFICANT CHANGE UP (ref 0.5–1.3)
DIFF PNL FLD: ABNORMAL
EOSINOPHIL # BLD AUTO: 0.1 K/UL — SIGNIFICANT CHANGE UP (ref 0–0.5)
EOSINOPHIL NFR BLD AUTO: 2.2 % — SIGNIFICANT CHANGE UP (ref 0–6)
ETHANOL SERPL-MCNC: <10 MG/DL — SIGNIFICANT CHANGE UP
GLUCOSE SERPL-MCNC: 92 MG/DL — SIGNIFICANT CHANGE UP (ref 70–115)
GLUCOSE UR QL: NEGATIVE MG/DL — SIGNIFICANT CHANGE UP
HCT VFR BLD CALC: 30.7 % — LOW (ref 37–47)
HGB BLD-MCNC: 9.5 G/DL — LOW (ref 12–16)
INR BLD: 1.4 RATIO — HIGH (ref 0.88–1.16)
KETONES UR-MCNC: NEGATIVE — SIGNIFICANT CHANGE UP
LEUKOCYTE ESTERASE UR-ACNC: NEGATIVE — SIGNIFICANT CHANGE UP
LYMPHOCYTES # BLD AUTO: 1.8 K/UL — SIGNIFICANT CHANGE UP (ref 1–4.8)
LYMPHOCYTES # BLD AUTO: 32.5 % — SIGNIFICANT CHANGE UP (ref 20–55)
MCHC RBC-ENTMCNC: 25.1 PG — LOW (ref 27–31)
MCHC RBC-ENTMCNC: 30.9 G/DL — LOW (ref 32–36)
MCV RBC AUTO: 81.2 FL — SIGNIFICANT CHANGE UP (ref 81–99)
MONOCYTES # BLD AUTO: 0.9 K/UL — HIGH (ref 0–0.8)
MONOCYTES NFR BLD AUTO: 16.2 % — HIGH (ref 3–10)
NEUTROPHILS # BLD AUTO: 2.7 K/UL — SIGNIFICANT CHANGE UP (ref 1.8–8)
NEUTROPHILS NFR BLD AUTO: 48.5 % — SIGNIFICANT CHANGE UP (ref 37–73)
NITRITE UR-MCNC: NEGATIVE — SIGNIFICANT CHANGE UP
PH UR: 8 — SIGNIFICANT CHANGE UP (ref 5–8)
PLATELET # BLD AUTO: 341 K/UL — SIGNIFICANT CHANGE UP (ref 150–400)
POTASSIUM SERPL-MCNC: 4.9 MMOL/L — SIGNIFICANT CHANGE UP (ref 3.5–5.3)
POTASSIUM SERPL-SCNC: 4.9 MMOL/L — SIGNIFICANT CHANGE UP (ref 3.5–5.3)
PROT SERPL-MCNC: 8.1 G/DL — SIGNIFICANT CHANGE UP (ref 6.6–8.7)
PROT UR-MCNC: NEGATIVE MG/DL — SIGNIFICANT CHANGE UP
PROTHROM AB SERPL-ACNC: 15.5 SEC — HIGH (ref 9.8–12.7)
RBC # BLD: 3.78 M/UL — LOW (ref 4.4–5.2)
RBC # FLD: 19.3 % — HIGH (ref 11–15.6)
SODIUM SERPL-SCNC: 138 MMOL/L — SIGNIFICANT CHANGE UP (ref 135–145)
SP GR SPEC: 1.01 — SIGNIFICANT CHANGE UP (ref 1.01–1.02)
TROPONIN T SERPL-MCNC: <0.01 NG/ML — SIGNIFICANT CHANGE UP (ref 0–0.06)
TYPE + AB SCN PNL BLD: SIGNIFICANT CHANGE UP
UROBILINOGEN FLD QL: NEGATIVE MG/DL — SIGNIFICANT CHANGE UP
WBC # BLD: 5.5 K/UL — SIGNIFICANT CHANGE UP (ref 4.8–10.8)
WBC # FLD AUTO: 5.5 K/UL — SIGNIFICANT CHANGE UP (ref 4.8–10.8)

## 2018-06-14 PROCEDURE — 85027 COMPLETE CBC AUTOMATED: CPT

## 2018-06-14 PROCEDURE — 80307 DRUG TEST PRSMV CHEM ANLYZR: CPT

## 2018-06-14 PROCEDURE — 93005 ELECTROCARDIOGRAM TRACING: CPT

## 2018-06-14 PROCEDURE — 72125 CT NECK SPINE W/O DYE: CPT

## 2018-06-14 PROCEDURE — 71045 X-RAY EXAM CHEST 1 VIEW: CPT | Mod: 26

## 2018-06-14 PROCEDURE — 81001 URINALYSIS AUTO W/SCOPE: CPT

## 2018-06-14 PROCEDURE — 70450 CT HEAD/BRAIN W/O DYE: CPT | Mod: 26

## 2018-06-14 PROCEDURE — 80053 COMPREHEN METABOLIC PANEL: CPT

## 2018-06-14 PROCEDURE — 99285 EMERGENCY DEPT VISIT HI MDM: CPT

## 2018-06-14 PROCEDURE — 36415 COLL VENOUS BLD VENIPUNCTURE: CPT

## 2018-06-14 PROCEDURE — 86901 BLOOD TYPING SEROLOGIC RH(D): CPT

## 2018-06-14 PROCEDURE — 86850 RBC ANTIBODY SCREEN: CPT

## 2018-06-14 PROCEDURE — 83605 ASSAY OF LACTIC ACID: CPT

## 2018-06-14 PROCEDURE — 84484 ASSAY OF TROPONIN QUANT: CPT

## 2018-06-14 PROCEDURE — 71045 X-RAY EXAM CHEST 1 VIEW: CPT

## 2018-06-14 PROCEDURE — 99285 EMERGENCY DEPT VISIT HI MDM: CPT | Mod: GC

## 2018-06-14 PROCEDURE — 86900 BLOOD TYPING SEROLOGIC ABO: CPT

## 2018-06-14 PROCEDURE — 70450 CT HEAD/BRAIN W/O DYE: CPT

## 2018-06-14 PROCEDURE — 85610 PROTHROMBIN TIME: CPT

## 2018-06-14 PROCEDURE — 72125 CT NECK SPINE W/O DYE: CPT | Mod: 26

## 2018-06-14 PROCEDURE — 93010 ELECTROCARDIOGRAM REPORT: CPT

## 2018-06-14 NOTE — ED PROVIDER NOTE - PROGRESS NOTE DETAILS
Patient cleared by trauma. Family (daughter and ) states that the patient appears to be at her baseline.  Daughter states that patient has had increased agitation over the past week.  Her baseline is oriented x 1.  Patient well appearing, interactive, unable to give clear history of events but has no complaints. Ramírez Gudino MD PGY3: pt at baseline, labs, CT, UA, cxr wnl, okay for d/c with pmd f/u.

## 2018-06-14 NOTE — H&P ADULT - ASSESSMENT
84F with AMS s/p fall on Eliquis. R/o ICH and cervical spine fracture, possible cardiac cause of syncope

## 2018-06-14 NOTE — ED ADULT NURSE NOTE - PSH
After Cataract, Bilateral (ICD9 366.50)     Section (ICD9 669.70)    No significant past surgical history    No significant past surgical history    S/P Breast Lumpectomy (ICD9 V45.89)

## 2018-06-14 NOTE — H&P ADULT - NSHPPHYSICALEXAM_GEN_ALL_CORE
Primary Survey:  A: Intact  B: Breath sounds equal b/l with symmetric rise and fall of the chest  C: Central and peripheral pulses intact b/l  D: PERRL, GCS 13 (4/4/5)  E: Patient fully exposed for exam and then covered with warm blankets    HEENT: small frontal hematoma, no scalp tenderness, PERRL, EOMI  Neck: Trachea midline, no swelling, no JVD, cervical collar in place  Chest: CTAB, equal air entry, no chest tenderness  Back: Without midline tenderness  Abdomen: Soft, NTTP, no ecchymosis, pelvis stable  Vascular: + femoral, radial, and DP pulses b/l  Neuro: confused, no focal deficit, motor and sensory function intact throughout  Extremities: No noted areas of deformity or point tenderness, FROM without pain  Skin: No lacerations or abrasions Primary Survey:  A: Intact  B: Breath sounds equal b/l with symmetric rise and fall of the chest  C: Central and peripheral pulses intact b/l  D: PERRL, GCS 13 (4/4/5)  E: Patient fully exposed for exam and then covered with warm blankets    HEENT: small frontal contusion, faded, likely old, no scalp tenderness, PERRL, EOMI  Neck: Trachea midline, no swelling, no JVD, cervical collar in place  Chest: CTAB, equal air entry, no chest tenderness  Back: Without midline tenderness  Abdomen: Soft, NTTP, no ecchymosis, pelvis stable  Vascular: + femoral, radial, and DP pulses b/l  Neuro: confused, no focal deficit, motor and sensory function intact throughout  Extremities: No noted areas of deformity or point tenderness, FROM without pain  Skin: No lacerations or abrasions

## 2018-06-14 NOTE — ED PROVIDER NOTE - OBJECTIVE STATEMENT
83 y/o F with hx of CAD, A-fib, anxiety, breast CA, CAD, CVA, dementia, depression, GERD, HTN, HLD, TIA, and hypothyroidism, presents to the ED as a trauma s/p fall on blood thinners. Pt's  reports the pt was in the bathroom, when he heard her fall to the ground. When we went in to check on the pt, he found her to be sitting up right, leaning against the tub. The patient has dementia however is reportedly more interactive at her baseline than she is currently. Pt takes coumadin as prescribed. Denies numbness, tingling, nausea, vomiting, and dysuria. No further complaints at this time. 83 y/o F with hx of CAD, A-fib, anxiety, breast CA, CAD, CVA, dementia, depression, GERD, HTN, HLD, TIA, and hypothyroidism, presents to the ED as a trauma s/p fall on blood thinners. Pt's  reports the pt was in the bathroom, when he heard her fall to the ground. When we went in to check on the pt, he found her to be sitting up right, leaning against the tub. The patient has dementia however is reportedly more interactive at her baseline than she is currently. Pt takes Eliquis as prescribed. Denies numbness, tingling, nausea, vomiting, and dysuria. No further complaints at this time. 83 y/o F with hx of CAD, A-fib, anxiety, breast CA, CAD, CVA, dementia, depression, GERD, HTN, HLD, TIA, and hypothyroidism, presents to the ED as a trauma s/p fall on blood thinners. Pt's  reports the pt was in the bathroom, when he heard her fall to the ground. When we went in to check on the pt, he found her to be sitting up right, leaning against the tub. He reports a bump on the pt's head, that was not there prior to the fall. The patient has dementia however is reportedly more interactive at her baseline than she is currently. Pt takes Eliquis as prescribed. Denies numbness, tingling, nausea, vomiting, and dysuria. No further complaints at this time.

## 2018-06-14 NOTE — ED ADULT NURSE REASSESSMENT NOTE - NS ED NURSE REASSESS COMMENT FT1
hob up 30 degrees,sr up,calm,daughter and  remain with patient, remains confused,maldonado, moves self slowly on stretcher for comfort, stretcher in lowest position with wheels locked for safety, comfortable in appearance,patient safety maintained, will monitor

## 2018-06-14 NOTE — ED ADULT TRIAGE NOTE - CHIEF COMPLAINT QUOTE
Pt with history of dementia on eliquis found on the floor in  bathroom after  heard her fall.   states obvious swelling to right side of forehead. Mild swelling present.  Baseline mental status.  Trauma B called.

## 2018-06-14 NOTE — ED PROVIDER NOTE - ATTENDING CONTRIBUTION TO CARE
I have also seen and evaluated the patient.  84 year old woman  hx of dementia presents to the ER via EMS after a fall at home.  Patient is on eliquis.   states that when he went to open the door for the physical therapist he turned around and the patient was no longer sitting on the cough and he heard commotion in the bathroom.  Patient was found on the bathroom floor.  Patient c/o neck pain, GCS 15, awake, alert and oriented x 1.  Code trauma activated.  No traumatic injuries noted on CT scan.  Her family reported patient to be at her baseline mental status in the ER.  She remained stable and was discharged.

## 2018-06-14 NOTE — ED PROVIDER NOTE - MEDICAL DECISION MAKING DETAILS
pt with fall on eloquis, trauma activated, will CT head, cervical spine, likely admit for AMS, will check ua, cxr, ekg.

## 2018-06-14 NOTE — H&P ADULT - ATTENDING COMMENTS
Found on bathroom floor.  Likely fall from standing.  unable to provide hx.  on eliquis.  Per EMS family states the pt has dementia but seems less interactive than usual.      Small R frontal scalp contusion, likely old.  No other signs of trauma.  Awake and alert.  confused.  Moving all extremities.      CT head and c-spine - images reviewed with radiologist.  No acute traumatic findings.    I: No acute injuries.      P: Dispo at ED discretion.

## 2018-06-14 NOTE — H&P ADULT - HISTORY OF PRESENT ILLNESS
Pt is an 84F who presents to [facility] s/p unwitnessed fall. In the trauma bay the patient was too confused to actively participate in an interview. Pt is an 84F who presents to [facility] s/p unwitnessed fall. In the trauma bay the patient was too confused to actively participate in an interview.  Was found on floor of bathroom.

## 2018-06-14 NOTE — ED PROVIDER NOTE - PMH
Afib    Afib (ICD9 427.31)    Anxiety (ICD9 300.00)    Atrial Fibrillation (ICD9 427.31)    Breast cancer    CAD (Coronary Artery Disease) (ICD9 414.00)  with STENTS in  apast last CATH  in 10/2009 with 90% MID LAD on Medical managemnt  CVA (Cerebral Vascular Accident) (ICD9 434.91)    Dementia    Dementia (ICD9 294.8)    Depression    GERD (Gastroesophageal Reflux Disease) (ICD9 530.81)    HTN    HTN (hypertension)    Hx of Breast Cancer (ICD9 V10.3)    Hypercholesterolemia (ICD9 272.0)    Hypothyroidism (ICD9 244.9)    Mitral Valve Prolapse    Mitral valve prolapse    Transient Ischemic Attack (TIA) (ICD9 435.9)

## 2018-06-14 NOTE — H&P ADULT - PROBLEM SELECTOR PLAN 1
CT head and cervical spine  Labs pending - including troponin  EKG ordered  Cervical collar in place  Trauma following

## 2018-06-14 NOTE — ED ADULT NURSE NOTE - NS ED NURSE DC INFO COMPLEXITY
Verbalized Understanding/Straightforward: Basic instructions, no meds, no home treatment/Patient asked questions/Returned Demonstration

## 2018-10-02 NOTE — DISCHARGE NOTE ADULT - PROVIDER TOKENS
TOKEN:'[id number]',TOKEN:'[id number]',FREE:[LAST:[primary care MD],PHONE:[(   )    -],FAX:[(   )    -],ADDRESS:[see PMD in 1-2 week]]
respirations non-labored/no rales/breath sounds equal/airway patent/no rhonchi/no wheezes/clear to auscultation bilaterally/good air movement

## 2019-09-13 NOTE — PROGRESS NOTE ADULT - ATTENDING COMMENTS
independent pulmonary edema: lasix IV 20 mg stat. will DC with PO lasix. will see cardiology in 1 week. patient is in no resp distress and maintaining SaO2 in RA  aspiration precaution    Disposition: will DC to [facility] today if ID clears. pulmonary edema: lasix IV 20 mg stat. will DC with PO lasix. will see cardiology in 1 week. patient is in no resp distress and maintaining SaO2 in RA  aspiration precaution    Addendum: BP low in pm. coreg dose was held. dced amlodipine. coreg can be given if BP more than 100/60      Disposition: DC to [facility] if stable.

## 2019-12-30 ENCOUNTER — EMERGENCY (EMERGENCY)
Facility: HOSPITAL | Age: 84
LOS: 1 days | Discharge: HOME | End: 2019-12-30
Attending: EMERGENCY MEDICINE
Payer: MEDICARE

## 2019-12-30 VITALS
SYSTOLIC BLOOD PRESSURE: 159 MMHG | RESPIRATION RATE: 18 BRPM | HEART RATE: 66 BPM | DIASTOLIC BLOOD PRESSURE: 70 MMHG | OXYGEN SATURATION: 100 %

## 2019-12-30 LAB
ALBUMIN SERPL ELPH-MCNC: 3.1 G/DL — LOW (ref 3.3–5.2)
ALP SERPL-CCNC: 118 U/L — SIGNIFICANT CHANGE UP (ref 40–120)
ALT FLD-CCNC: 15 U/L — SIGNIFICANT CHANGE UP
ANION GAP SERPL CALC-SCNC: 13 MMOL/L — SIGNIFICANT CHANGE UP (ref 5–17)
APTT BLD: 32.4 SEC — SIGNIFICANT CHANGE UP (ref 27.5–36.3)
AST SERPL-CCNC: 23 U/L — SIGNIFICANT CHANGE UP
BASOPHILS # BLD AUTO: 0.02 K/UL — SIGNIFICANT CHANGE UP (ref 0–0.2)
BASOPHILS NFR BLD AUTO: 0.4 % — SIGNIFICANT CHANGE UP (ref 0–2)
BILIRUB SERPL-MCNC: <0.2 MG/DL — LOW (ref 0.4–2)
BUN SERPL-MCNC: 20 MG/DL — SIGNIFICANT CHANGE UP (ref 8–20)
CALCIUM SERPL-MCNC: 9.3 MG/DL — SIGNIFICANT CHANGE UP (ref 8.6–10.2)
CHLORIDE SERPL-SCNC: 105 MMOL/L — SIGNIFICANT CHANGE UP (ref 98–107)
CO2 SERPL-SCNC: 22 MMOL/L — SIGNIFICANT CHANGE UP (ref 22–29)
CREAT SERPL-MCNC: 1.08 MG/DL — SIGNIFICANT CHANGE UP (ref 0.5–1.3)
EOSINOPHIL # BLD AUTO: 0.12 K/UL — SIGNIFICANT CHANGE UP (ref 0–0.5)
EOSINOPHIL NFR BLD AUTO: 2.1 % — SIGNIFICANT CHANGE UP (ref 0–6)
GLUCOSE SERPL-MCNC: 89 MG/DL — SIGNIFICANT CHANGE UP (ref 70–115)
HCT VFR BLD CALC: 42.7 % — SIGNIFICANT CHANGE UP (ref 34.5–45)
HGB BLD-MCNC: 13.2 G/DL — SIGNIFICANT CHANGE UP (ref 11.5–15.5)
IMM GRANULOCYTES NFR BLD AUTO: 0.2 % — SIGNIFICANT CHANGE UP (ref 0–1.5)
INR BLD: 1.95 RATIO — HIGH (ref 0.88–1.16)
LYMPHOCYTES # BLD AUTO: 1.24 K/UL — SIGNIFICANT CHANGE UP (ref 1–3.3)
LYMPHOCYTES # BLD AUTO: 21.8 % — SIGNIFICANT CHANGE UP (ref 13–44)
MCHC RBC-ENTMCNC: 30.9 GM/DL — LOW (ref 32–36)
MCHC RBC-ENTMCNC: 30.9 PG — SIGNIFICANT CHANGE UP (ref 27–34)
MCV RBC AUTO: 100 FL — SIGNIFICANT CHANGE UP (ref 80–100)
MONOCYTES # BLD AUTO: 0.68 K/UL — SIGNIFICANT CHANGE UP (ref 0–0.9)
MONOCYTES NFR BLD AUTO: 11.9 % — SIGNIFICANT CHANGE UP (ref 2–14)
NEUTROPHILS # BLD AUTO: 3.63 K/UL — SIGNIFICANT CHANGE UP (ref 1.8–7.4)
NEUTROPHILS NFR BLD AUTO: 63.6 % — SIGNIFICANT CHANGE UP (ref 43–77)
PLATELET # BLD AUTO: 164 K/UL — SIGNIFICANT CHANGE UP (ref 150–400)
POTASSIUM SERPL-MCNC: 4.7 MMOL/L — SIGNIFICANT CHANGE UP (ref 3.5–5.3)
POTASSIUM SERPL-SCNC: 4.7 MMOL/L — SIGNIFICANT CHANGE UP (ref 3.5–5.3)
PROT SERPL-MCNC: 7.4 G/DL — SIGNIFICANT CHANGE UP (ref 6.6–8.7)
PROTHROM AB SERPL-ACNC: 22.9 SEC — HIGH (ref 10–12.9)
RBC # BLD: 4.27 M/UL — SIGNIFICANT CHANGE UP (ref 3.8–5.2)
RBC # FLD: 13.3 % — SIGNIFICANT CHANGE UP (ref 10.3–14.5)
SODIUM SERPL-SCNC: 140 MMOL/L — SIGNIFICANT CHANGE UP (ref 135–145)
WBC # BLD: 5.7 K/UL — SIGNIFICANT CHANGE UP (ref 3.8–10.5)
WBC # FLD AUTO: 5.7 K/UL — SIGNIFICANT CHANGE UP (ref 3.8–10.5)

## 2019-12-30 PROCEDURE — 96374 THER/PROPH/DIAG INJ IV PUSH: CPT

## 2019-12-30 PROCEDURE — 83605 ASSAY OF LACTIC ACID: CPT

## 2019-12-30 PROCEDURE — 72125 CT NECK SPINE W/O DYE: CPT

## 2019-12-30 PROCEDURE — 70450 CT HEAD/BRAIN W/O DYE: CPT

## 2019-12-30 PROCEDURE — 85730 THROMBOPLASTIN TIME PARTIAL: CPT

## 2019-12-30 PROCEDURE — 72125 CT NECK SPINE W/O DYE: CPT | Mod: 26

## 2019-12-30 PROCEDURE — 99284 EMERGENCY DEPT VISIT MOD MDM: CPT

## 2019-12-30 PROCEDURE — 99285 EMERGENCY DEPT VISIT HI MDM: CPT | Mod: 25

## 2019-12-30 PROCEDURE — 72170 X-RAY EXAM OF PELVIS: CPT

## 2019-12-30 PROCEDURE — 72170 X-RAY EXAM OF PELVIS: CPT | Mod: 26

## 2019-12-30 PROCEDURE — 99283 EMERGENCY DEPT VISIT LOW MDM: CPT

## 2019-12-30 PROCEDURE — 85610 PROTHROMBIN TIME: CPT

## 2019-12-30 PROCEDURE — 71045 X-RAY EXAM CHEST 1 VIEW: CPT

## 2019-12-30 PROCEDURE — 85027 COMPLETE CBC AUTOMATED: CPT

## 2019-12-30 PROCEDURE — 70450 CT HEAD/BRAIN W/O DYE: CPT | Mod: 26

## 2019-12-30 PROCEDURE — 36415 COLL VENOUS BLD VENIPUNCTURE: CPT

## 2019-12-30 PROCEDURE — 80053 COMPREHEN METABOLIC PANEL: CPT

## 2019-12-30 PROCEDURE — 71045 X-RAY EXAM CHEST 1 VIEW: CPT | Mod: 26

## 2019-12-30 PROCEDURE — 94760 N-INVAS EAR/PLS OXIMETRY 1: CPT

## 2019-12-30 RX ORDER — LEVETIRACETAM 250 MG/1
500 TABLET, FILM COATED ORAL ONCE
Refills: 0 | Status: COMPLETED | OUTPATIENT
Start: 2019-12-30 | End: 2019-12-30

## 2019-12-30 RX ORDER — SODIUM CHLORIDE 9 MG/ML
1000 INJECTION INTRAMUSCULAR; INTRAVENOUS; SUBCUTANEOUS ONCE
Refills: 0 | Status: COMPLETED | OUTPATIENT
Start: 2019-12-30 | End: 2019-12-30

## 2019-12-30 RX ADMIN — SODIUM CHLORIDE 1000 MILLILITER(S): 9 INJECTION INTRAMUSCULAR; INTRAVENOUS; SUBCUTANEOUS at 03:30

## 2019-12-30 RX ADMIN — LEVETIRACETAM 420 MILLIGRAM(S): 250 TABLET, FILM COATED ORAL at 02:55

## 2019-12-30 NOTE — PROGRESS NOTE ADULT - ASSESSMENT
ASSESSMENT: Patient is a 86y old female s/p fall after a seizure.     PLAN:    CT scans are negative for acute traumatic injury.   Recommend seizure workup.   No further evaluation or treatment to do from the surgical stand point. We will sign off.     Thank you for the opportunity of taking care of your patient.   Any questions please call

## 2019-12-30 NOTE — ED PROVIDER NOTE - CARE PLAN
Principal Discharge DX:	Closed head injury Principal Discharge DX:	Closed head injury  Secondary Diagnosis:	Seizure

## 2019-12-30 NOTE — ED PROVIDER NOTE - SEVERE SEPSIS ALERT DETAILS
Lactate elevated, likely in setting of seizure. NO signs/symptoms to reflect sepsis at this time. Socorro Bal DO

## 2019-12-30 NOTE — PROGRESS NOTE ADULT - SUBJECTIVE AND OBJECTIVE BOX
HPI/OVERNIGHT EVENTS:    No acute events, no complains. Pt given loading dose of keppra 500mg.     MEDICATIONS  (STANDING):    MEDICATIONS  (PRN):      Vital Signs Last 24 Hrs  T(C): --  T(F): --  HR: --  BP: --  BP(mean): --  RR: --  SpO2: --    Constitutional: patient resting comfortably in bed, in no acute distress  HEENT: EOMI / PERRL b/l, no active drainage or redness. Left frontal hematoma.   Neck: No JVD, full ROM without pain  Respiratory: respirations are unlabored, no accessory muscle use, no conversational dyspnea  Cardiovascular: regular rate & rhythm  Gastrointestinal: Abdomen soft, non-tender, non-distended, no rebound tenderness / guarding        I&O's Detail      LABS:                        13.2   5.70  )-----------( 164      ( 30 Dec 2019 01:37 )             42.7           PT/INR - ( 30 Dec 2019 01:39 )   PT: 22.9 sec;   INR: 1.95 ratio         PTT - ( 30 Dec 2019 01:39 )  PTT:32.4 sec

## 2019-12-30 NOTE — H&P ADULT - ATTENDING COMMENTS
fall at home on AC , family witnessed possible seizure.  primary gcs at baseline , averbal, gcs 13 , scalp hematoma  labs . imaging noted  plan  f/u offical imaging reads  needs tertiary exam prior to trauma clearance .

## 2019-12-30 NOTE — ED PROVIDER NOTE - PHYSICAL EXAMINATION
Gen: NAD  Head: NCAT  HEENT: PERRL, oral mucosa moist, normal conjunctiva, oropharynx clear without exudate or erythema  Lung: CTAB, no respiratory distress, no wheezing, rales, rhonchi  CV: normal s1/s2, rrr, no murmurs, Normal perfusion, pulses 2+ throughout  Abd: soft, NTND, no CVA tenderness  MSK: No edema, no visible deformities, full range of motion in all 4 extremities  Neuro: CN II-XII grossly intact, No focal neurologic deficits  Skin: No rash. +L frontal hematoma   Psych: normal affect Gen: NAD, nonverbal, awake, alert  Head: hematoma present L frontal scalp  HEENT: PERRL, EOMI, oral mucosa moist, normal conjunctiva, oropharynx clear without exudate or erythema  Lung: CTAB, no respiratory distress, no wheezing, rales, rhonchi  CV: normal s1/s2, rrr, no murmurs, Normal perfusion, pulses 2+ throughout  Abd: soft, NTND  MSK: No edema, no visible deformities, full range of motion in all 4 extremities  Neuro: No focal neurologic deficits  Skin: No rash. +L frontal hematoma   Psych: normal affect

## 2019-12-30 NOTE — ED PROVIDER NOTE - PATIENT PORTAL LINK FT
You can access the FollowMyHealth Patient Portal offered by [facility] by registering at the following website: [email or web address]. By joining [facility]’s FollowMyHealth portal, you will also be able to view your health information using other applications (apps) compatible with our system.

## 2019-12-30 NOTE — ED PROVIDER NOTE - PROGRESS NOTE DETAILS
Patient at baseline mental status, cleared by trauma, stable for OH. Socorro Bal DO Patient cleared by trauma team. Daughter at bedside, states patient is at baseline mental status, cleared by trauma, stable for DC. Lactate likely elevating in setting of seizure. Socorro Bal DO

## 2019-12-30 NOTE — ED PROVIDER NOTE - CLINICAL SUMMARY MEDICAL DECISION MAKING FREE TEXT BOX
85 y/o F with head injury s/p seizure, on Eliquis. Will get CT head trauma B activated and reassess.

## 2019-12-30 NOTE — ED PROVIDER NOTE - OBJECTIVE STATEMENT
85 y/o F with PMHx of A fib, CVA, CAD with stents, Dementia, GERD, breast cancer, presents to the ED BIBA s/p seizure. Per EMS, pt was at home with daughter, her daughter heard a thump, and witnessed pt having a seizure on the floor at 12:25AM. Pt was sitting in a chair, had a seizure and fell off the chair hitting her head. Hx of seizures. Pt at baseline advanced alzheimer. 87 y/o F with PMHx of A fib, CVA, CAD with stents, Dementia, GERD, breast cancer, seizure disorder presents to the ED BIBA s/p seizure. Per EMS, pt was at home with daughter, her daughter heard a thump, and witnessed pt having a seizure on the floor at 12:25AM. Pt was sitting in a chair, had a seizure and fell off the chair hitting her head, lasted several moments with postictal period, typical of seizures in past. Pt at baseline advanced alzheimer.

## 2019-12-30 NOTE — ED ADULT TRIAGE NOTE - CHIEF COMPLAINT QUOTE
as per ems patient was sitting in a chair when she had a seizure, patient with a history of seizures, patient then fell out of the chair striking her head, patient on eliquis, with hematoma to forehead, as per ems patient with history of dimentia, and patient is at baseline, patient brought to trauma room and code trauma B called.

## 2019-12-30 NOTE — RESPIRATORY CARE EMERGENCY NOTE - CAC RESPIRATORY COMMENTS
Trauma B called on this patient and responded by respiratory therapist as per policy, I-Stat done as per policy & made MD aware of results, MD signed and filed in chart , on RA with Spo2

## 2020-09-24 NOTE — ED ADULT TRIAGE NOTE - NS ED NURSE BANDS TYPE
Chronic medical problem.  She is not taking any NSAIDs.  Her BP is controlled.  Last lab results:  Results for FREDDY ARROYO (MRN [id number]) as of 9/24/2020 12:03   Ref. Range 1/17/2019 06:22 4/17/2020 09:11   GFR If Non  Latest Ref Range: >60 mL/min/1.73 m 2 49 (A) 53 (A)      Name band;

## 2021-06-03 NOTE — PATIENT PROFILE ADULT. - FALL HARM RISK TYPE OF ASSESSMENT
Pt came into infusion clinic for her treatment as ordered. Labs Reviewed. Medications explained to pt who verbalized understanding. Port patent throughout infusion. Pt tolerated infusion with no complications. Pt states she still has a significant rash but she did not tell Dr. Villalba about it. Dr. Villalba came over to assess. He will be ordering an Rx cream for pt. He also says he has not heard back from Cardiology yet but he has placed a call to them. Pt was told to call or report to ED for worsening symptoms. Pt left infusion clinic via ambulatory and will RTC as sched.     
Admission

## 2021-07-31 NOTE — H&P ADULT - PROBLEM SELECTOR PLAN 3
Thiamine 200mg in D5 100ml D5 IV over 1 hour x4days  Vitamin C / ascorbic acid 1500mg in 100ml ns IV Q 6 hours x4days  hydrocortosone (solucortef) 50mg IVP Q6 x4days  30 cc/kg fluid challenge  Broad sectrum antibiotics  repeat lactate Patient/Family

## 2021-08-16 NOTE — DISCHARGE NOTE ADULT - PATIENT PORTAL LINK FT
Ajit Méndez
“You can access the FollowHealth Patient Portal, offered by [facility], by registering with the following website: [email or web address]”

## 2022-07-29 NOTE — ED PROVIDER NOTE - DATE/TIME 3
[de-identified] : - Constitutional: This is a male in no obvious distress. \par - Psych: Patient is alert and oriented to person, place and time.  Patient has a normal mood and affect.\par - Cardiovascular: Normal pulses throughout the upper extremities.  No significant varicosities are noted in the upper extremities. \par - Neuro: Strength and sensation are intact throughout the upper extremities.  Patient has normal coordination.\par - Respiratory:  Patient exhibits no evidence of shortness of breath or difficulty breathing.\par - Skin: No rashes, lesions, or other abnormalities are noted in the upper extremities.\par \par --- \par \par Examination of his right thumb demonstrates decreased swelling and tenderness along the CMC joint.  There are some laxity with no instability to stress testing.  There is no obvious tenderness along the CMC joint.  He does have some tenderness proximally along the FCR tendon.  There is no swelling or tenderness along the first dorsal compartment and there is a negative Finkelstein sign.  He remains neurovascularly intact distally.\par \par Examination of the left wrist and hand demonstrates no further swelling or tenderness along the CMC joint.  There is some tenderness along the proximal aspect of the first dorsal compartment and he is a mildly positive Finkelstein sign.  He remains neurovascularly intact distally. 14-Jun-2018 21:44
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
## 2023-03-02 NOTE — PROGRESS NOTE ADULT - PROBLEM/PLAN-7
DISPLAY PLAN FREE TEXT
independent

## 2023-03-20 NOTE — ED PROVIDER NOTE - DURATION
1st attempt - spoke with pt about overdue stool samples she stated she would be in this week to drop those off and has all the equipment she needs to complete this - mpg   
today

## 2024-04-01 NOTE — ED PROVIDER NOTE - NS ED MD TWO NIGHTS YN
Patient is in the supine position.   Procedure performed on a bed/cart.The patient was positioned by Ariel Granado RTR Yes

## 2024-09-03 NOTE — PROGRESS NOTE ADULT - PROBLEM SELECTOR PLAN 8
Request for medical records received via fax from [facility]. Request faxed to [facility] for processing. [phone]. Fax confirmation received     
continue SQ Heparin

## 2024-12-04 NOTE — CONSULT NOTE ADULT - SUBJECTIVE AND OBJECTIVE BOX
[FreeTextEntry1] :  Kimberlyn presents for follow up with a h/o DANE and UUI. She has been doing PFPT with some improvement in symptoms.   Obhx:   x3- , , & PMH: osteopenia PSH: meniscus surgery 2016    NPP INFECTIOUS DISEASES AND INTERNAL MEDICINE at [place]  =======================================================  Orestes Horton MD [facility]   Kelvin March MD  Diplomates American Board of Internal Medicine and Infectious Diseases  =======================================================    [id number]  HIPOLITO AGUILAR is a 84y  Female   This 84 y.o. F with afib, MVP and breast cancer, found lethargic by family 18 and  EMS called. Found with "thready pulses" and CPR was done few seconds and pt woke up. She was also found with hypothermia and hypotensive.     She was admitted to the medical ICU and was treated for a possible UTI.  A CXR showed consolidation, right effusion, and concern raised for possible aspiration PNA.    labs reviewed.  Initially patient had normal WBC from 18 through 18; repeat labs on 17 showed WBC of 22k.    initial UA on 18 showed trace LE, moderate epithelial cells, many bacteria, 11-25 RBD and 3-5 WBC.    initial CXR showed a left lower lobe consolidation.     blood cultures from admission were negative x 2 sets.   urine cx showed 50-90k CFU of corynebacterium.   blood cultures were repeated on 18 and 18 2 sets each.     She had low grade temps on 18, and developed fever around midnight of 18. Temp of 100.9    =======================================================  Past Medical & Surgical Hx:  =====================  PAST MEDICAL & SURGICAL HISTORY:  Afib  Breast cancer  Mitral valve prolapse  HTN (hypertension)  Dementia  Mitral Valve Prolapse  Depression  CVA (Cerebral Vascular Accident) (ICD9 434.91)  CAD (Coronary Artery Disease) (ICD9 414.00): with STENTS in  apast last CATH  in 10/2009 with 90% MID LAD on Medical managemnt  Dementia (ICD9 294.8)  Anxiety (ICD9 300.00)  GERD (Gastroesophageal Reflux Disease) (ICD9 530.81)  Atrial Fibrillation (ICD9 427.31)  Afib (ICD9 427.31)  Hypothyroidism (ICD9 244.9)  Hx of Breast Cancer (ICD9 V10.3)  Hypercholesterolemia (ICD9 272.0)  HTN  Transient Ischemic Attack (TIA) (ICD9 435.9)  No significant past surgical history  No significant past surgical history  S/P Breast Lumpectomy (ICD9 V45.89)   Section (ICD9 669.70)  After Cataract, Bilateral (ICD9 366.50)      Problem List:  ==========  HEALTH ISSUES - PROBLEM Dx:  Hospital acquired PNA: Hospital acquired PNA  Hypernatremia: Hypernatremia  Cough: Cough  Non-sustained ventricular tachycardia: Non-sustained ventricular tachycardia  Protein calorie malnutrition: Protein calorie malnutrition  Prophylactic measure: Prophylactic measure  Palliative care encounter: Palliative care encounter  Debility: Debility  Urinary tract infection without hematuria, site unspecified: Urinary tract infection without hematuria, site unspecified  Advanced care planning/counseling discussion: Advanced care planning/counseling discussion  Failure to thrive in adult: Failure to thrive in adult  Dementia without behavioral disturbance, unspecified dementia type: Dementia without behavioral disturbance, unspecified dementia type  Atrial fibrillation with RVR: Atrial fibrillation with RVR  Severe sepsis: Severe sepsis  Septic shock: Septic shock  UTI (urinary tract infection): UTI (urinary tract infection)         Social Hx:  =======  no toxic habits currently      FAMILY HISTORY:  No pertinent family history in first degree relatives  Family history of stroke (Mother)      Allergies    latex (Unknown)  No Known Drug Allergies  shellfish (Hives; Rash)  shellfish (Other)    Intolerances        MEDICATIONS  (STANDING):  acetylcysteine 10% Inhalation 6 milliLiter(s) Inhalation every 8 hours  ALBUTerol    0.083% 2.5 milliGRAM(s) Nebulizer every 8 hours  amLODIPine   Tablet 5 milliGRAM(s) Oral daily  apixaban 2.5 milliGRAM(s) Oral every 12 hours  aspirin  chewable 81 milliGRAM(s) Oral daily  carvedilol 3.125 milliGRAM(s) Oral every 12 hours  donepezil 10 milliGRAM(s) Oral at bedtime  levETIRAcetam 250 milliGRAM(s) Oral two times a day  levothyroxine 100 MICROGram(s) Oral daily  memantine 10 milliGRAM(s) Oral daily  pantoprazole    Tablet 40 milliGRAM(s) Oral before breakfast  piperacillin/tazobactam IVPB. 3.375 Gram(s) IV Intermittent every 8 hours    MEDICATIONS  (PRN):  acetaminophen  Suppository 650 milliGRAM(s) Rectal every 6 hours PRN For Temp greater than 38 C (100.4 F)  hydrALAZINE Injectable 10 milliGRAM(s) IV Push every 2 hours PRN SBP> 160        =======================================================  REVIEW OF SYSTEMS:  Constitutional: No fever, No chills, No sweats.  Eye: No icterus, No double vision.  Ear/Nose/Mouth/Throat: No nasal congestion, No sore throat.  Respiratory: No shortness of breath, No cough, No sputum production, No wheezing.  Cardiovascular: No chest pain, No palpitations, No syncope.  Gastrointestinal: No nausea, No vomiting, No diarrhea, No abdominal pain.  Genitourinary: No dysuria, No hematuria, No change in urine stream.  Hematology/Lymphatics: No bleeding tendency.  Endocrine: No excessive thirst, No polyuria.  Immunologic: No malaise.  Musculoskeletal: No back pain, No neck pain, No joint pain, No muscle pain.  Integumentary: No rash, No pruritus, No skin lesion.  Neurologic: No numbness, No tingling, No headache.  Psychiatric: No depression.    =======================================================  I&O's Detail      Physical Exam:  ============  Vital Signs Last 24 Hrs  T(C): 37.7 (2018 08:07), Max: 38.3 (2018 00:20)  T(F): 99.9 (2018 08:07), Max: 100.9 (2018 00:20)  HR: 82 (2018 08:05) (70 - 111)  BP: 122/62 (2018 08:05) (112/62 - 169/79)  BP(mean): --  RR: 16 (2018 08:05) (16 - 20)  SpO2: 100% (2018 08:05) (98% - 100%)    Weight (kg): 52.8 (-16 @ 14:41)  General: Alert and oriented, No acute distress.  Eye: Pupils are equal, round and reactive to light, Extraocular movements are intact, Normal conjunctiva.  HENT: Normocephalic, Oral mucosa is moist, No pharyngeal erythema, No sinus tenderness.  Neck: Supple, No lymphadenopathy.  Respiratory: Lungs are clear to auscultation, Respirations are non-labored.  Cardiovascular: Normal rate, Regular rhythm, No murmur, Good pulses equal in all extremities, No edema.  Gastrointestinal: Soft, Non-tender, Non-distended, Normal bowel sounds.  Genitourinary: No costovertebral angle tenderness.  Lymphatics: No lymphadenopathy neck, axilla, groin.  Musculoskeletal: Normal range of motion, Normal strength.  Integumentary: No rash.  Neurologic: Alert, Oriented, No focal deficits, Cranial Nerves II-XII are grossly intact.  Psychiatric: Appropriate mood & affect.      =======================================================  Labs:  ====    Labs:      145  |  104  |  16.0  ----------------------------<  100  3.9   |  30.0<H>  |  1.04    Ca    9.0      2018 02:54  Mg     1.7         TPro  6.6  /  Alb  2.8<L>  /  TBili  0.4  /  DBili  x   /  AST  32<H>  /  ALT  19  /  AlkPhos  102  -                          10.1   13.9  )-----------( 161      ( 2018 06:18 )             30.2         Urinalysis Basic - ( 2018 03:01 )    Color: Yellow / Appearance: Clear / S.010 / pH: x  Gluc: x / Ketone: Negative  / Bili: Negative / Urobili: Negative mg/dL   Blood: x / Protein: 30 mg/dL / Nitrite: Negative   Leuk Esterase: Trace / RBC: 3-5 /HPF / WBC 0-2   Sq Epi: x / Non Sq Epi: Moderate / Bacteria: x      LIVER FUNCTIONS - ( 2018 05:54 )  Alb: 2.8 g/dL / Pro: 6.6 g/dL / ALK PHOS: 102 U/L / ALT: 19 U/L / AST: 32 U/L / GGT: x               CAPILLARY BLOOD GLUCOSE      POCT Blood Glucose.: 101 mg/dL (2018 08:10)  POCT Blood Glucose.: 86 mg/dL (2018 00:48)    ABG - ( 2018 01:33 )  pH: 7.54  /  pCO2: 38    /  pO2: 56    / HCO3: 33    / Base Excess: 8.9   /  SaO2: 93                  RECENT CULTURES:   @ 00:39          NotDetec   @ 03:15 .Urine Catheterized     50,000 - 99,000 CFU/mL Corynebacterium species         @ 19:44 .Blood Blood     No growth at 5 days. NPP INFECTIOUS DISEASES AND INTERNAL MEDICINE at [place]  =======================================================  Orestes Horton MD [facility]   Kelvin March MD  Diplomates American Board of Internal Medicine and Infectious Diseases  =======================================================    [id number]  HIPOLITO AGUILAR is a 84y  Female   This 84 y.o. F with afib, MVP and breast cancer, found lethargic by family 18 and  EMS called. Found with "thready pulses" and CPR was done few seconds and pt woke up. She was also found with hypothermia and hypotensive.     She was admitted to the medical ICU and was treated for a possible UTI.  A CXR showed consolidation, right effusion, and concern raised for possible aspiration PNA.    labs reviewed.  Initially patient had normal WBC from 18 through 18; repeat labs on 17 showed WBC of 22k.    initial UA on 18 showed trace LE, moderate epithelial cells, many bacteria, 11-25 RBD and 3-5 WBC.    initial CXR showed a left lower lobe consolidation.   blood cultures from admission were negative x 2 sets.   urine cx showed 50-90k CFU of corynebacterium.   blood cultures were repeated on 18 and 18 2 sets each.   She had low grade temps on 18, and developed fever around midnight of 18. Temp of 100.9    patient had been on Cefepime from 18 to 18.  She was then started on Zosyn on 1/15/18.  patient cannot offer history.   during interview, patient was seen by speech pathology.     =======================================================  Past Medical & Surgical Hx:  =====================  PAST MEDICAL & SURGICAL HISTORY:  Afib  Breast cancer  Mitral valve prolapse  HTN (hypertension)  Dementia  Mitral Valve Prolapse  Depression  CVA (Cerebral Vascular Accident) (ICD9 434.91)  CAD (Coronary Artery Disease) (ICD9 414.00): with STENTS in  apast last CATH  in 10/2009 with 90% MID LAD on Medical managemnt  Dementia (ICD9 294.8)  Anxiety (ICD9 300.00)  GERD (Gastroesophageal Reflux Disease) (ICD9 530.81)  Atrial Fibrillation (ICD9 427.31)  Afib (ICD9 427.31)  Hypothyroidism (ICD9 244.9)  Hx of Breast Cancer (ICD9 V10.3)  Hypercholesterolemia (ICD9 272.0)  HTN  Transient Ischemic Attack (TIA) (ICD9 435.9)  No significant past surgical history  No significant past surgical history  S/P Breast Lumpectomy (ICD9 V45.89)   Section (ICD9 669.70)  After Cataract, Bilateral (ICD9 366.50)      Problem List:  ==========  HEALTH ISSUES - PROBLEM Dx:  Hospital acquired PNA: Hospital acquired PNA  Hypernatremia: Hypernatremia  Cough: Cough  Non-sustained ventricular tachycardia: Non-sustained ventricular tachycardia  Protein calorie malnutrition: Protein calorie malnutrition  Prophylactic measure: Prophylactic measure  Palliative care encounter: Palliative care encounter  Debility: Debility  Urinary tract infection without hematuria, site unspecified: Urinary tract infection without hematuria, site unspecified  Advanced care planning/counseling discussion: Advanced care planning/counseling discussion  Failure to thrive in adult: Failure to thrive in adult  Dementia without behavioral disturbance, unspecified dementia type: Dementia without behavioral disturbance, unspecified dementia type  Atrial fibrillation with RVR: Atrial fibrillation with RVR  Severe sepsis: Severe sepsis  Septic shock: Septic shock  UTI (urinary tract infection): UTI (urinary tract infection)         Social Hx:  =======  no toxic habits currently      FAMILY HISTORY:  No pertinent family history in first degree relatives  Family history of stroke (Mother)      Allergies    latex (Unknown)  No Known Drug Allergies  shellfish (Hives; Rash)  shellfish (Other)    Intolerances        MEDICATIONS  (STANDING):  acetylcysteine 10% Inhalation 6 milliLiter(s) Inhalation every 8 hours  ALBUTerol    0.083% 2.5 milliGRAM(s) Nebulizer every 8 hours  amLODIPine   Tablet 5 milliGRAM(s) Oral daily  apixaban 2.5 milliGRAM(s) Oral every 12 hours  aspirin  chewable 81 milliGRAM(s) Oral daily  carvedilol 3.125 milliGRAM(s) Oral every 12 hours  donepezil 10 milliGRAM(s) Oral at bedtime  levETIRAcetam 250 milliGRAM(s) Oral two times a day  levothyroxine 100 MICROGram(s) Oral daily  memantine 10 milliGRAM(s) Oral daily  pantoprazole    Tablet 40 milliGRAM(s) Oral before breakfast  piperacillin/tazobactam IVPB. 3.375 Gram(s) IV Intermittent every 8 hours    MEDICATIONS  (PRN):  acetaminophen  Suppository 650 milliGRAM(s) Rectal every 6 hours PRN For Temp greater than 38 C (100.4 F)  hydrALAZINE Injectable 10 milliGRAM(s) IV Push every 2 hours PRN SBP> 160        =======================================================  REVIEW OF SYSTEMS:  unable to obtain due to medical condition.    =======================================================  I&O's Detail      Physical Exam:  ============  Vital Signs Last 24 Hrs  T(C): 37.7 (2018 08:07), Max: 38.3 (2018 00:20)  T(F): 99.9 (2018 08:07), Max: 100.9 (2018 00:20)  HR: 82 (2018 08:05) (70 - 111)  BP: 122/62 (2018 08:05) (112/62 - 169/79)  RR: 16 (2018 08:05) (16 - 20)  SpO2: 100% (2018 08:05) (98% - 100%)    Weight (kg): 52.8 (16 @ 14:41)  General: Alert and oriented, No acute distress.  thin  Eye: Pupils are equal, round and reactive to light, Extraocular movements are intact, Normal conjunctiva.  HENT: Normocephalic, Oral mucosa is moist, No pharyngeal erythema, No sinus tenderness.  Neck: Supple, No lymphadenopathy.  Respiratory: POOR COOPERATION WITH EXAM.  DIMINISHED BREATH SOUNDS AT BASE.   Cardiovascular: Normal rate, Regular rhythm, No murmur, Good pulses equal in all extremities, No edema.  Gastrointestinal: Soft, Non-tender, Non-distended, Normal bowel sounds.  Genitourinary: No costovertebral angle tenderness.  Lymphatics: No lymphadenopathy neck, axilla, groin.  Musculoskeletal: Normal range of motion, Normal strength.  Integumentary: No rash.  Neurologic: Alert, Oriented, No focal deficits, Cranial Nerves II-XII are grossly intact.  Psychiatric: Appropriate mood & affect.      =======================================================  Labs:  ====    Labs:      145  |  104  |  16.0  ----------------------------<  100  3.9   |  30.0<H>  |  1.04    Ca    9.0      2018 02:54  Mg     1.7         TPro  6.6  /  Alb  2.8<L>  /  TBili  0.4  /  DBili  x   /  AST  32<H>  /  ALT  19  /  AlkPhos  102                            10.1   13.9  )-----------( 161      ( 2018 06:18 )             30.2         Urinalysis Basic - ( 2018 03:01 )    Color: Yellow / Appearance: Clear / S.010 / pH: x  Gluc: x / Ketone: Negative  / Bili: Negative / Urobili: Negative mg/dL   Blood: x / Protein: 30 mg/dL / Nitrite: Negative   Leuk Esterase: Trace / RBC: 3-5 /HPF / WBC 0-2   Sq Epi: x / Non Sq Epi: Moderate / Bacteria: x      LIVER FUNCTIONS - ( 2018 05:54 )  Alb: 2.8 g/dL / Pro: 6.6 g/dL / ALK PHOS: 102 U/L / ALT: 19 U/L / AST: 32 U/L / GGT: x               POCT Blood Glucose.: 101 mg/dL (2018 08:10)  POCT Blood Glucose.: 86 mg/dL (2018 00:48)    ABG - ( 2018 01:33 )  pH: 7.54  /  pCO2: 38    /  pO2: 56    / HCO3: 33    / Base Excess: 8.9   /  SaO2: 93          RECENT CULTURES:   @ 00:39          NotDetec   @ 03:15 .Urine Catheterized     50,000 - 99,000 CFU/mL Corynebacterium species         @ 19:44 .Blood Blood     No growth at 5 days.

## 2025-01-28 NOTE — PROGRESS NOTE ADULT - PROBLEM/PLAN-5
Per PM&R physiatrist Dr. Taiwo Edwards, Do not pursue peer to peer due to patient returned to baseline no function al goals.    Updated Farida GARNER:  Perfect Serve  at this time.   
DISPLAY PLAN FREE TEXT

## 2025-06-05 NOTE — H&P ADULT - HISTORY OF PRESENT ILLNESS
Advance Care Planning     Advance Care Planning Inpatient Note  Spiritual Care Department    Today's Date: 6/5/2025  Unit: [address]    Received request from patient.  Upon review of chart and communication with care team, patient's decision making abilities are not in question.. Patient was/were present in the room during visit.    Goals of ACP Conversation:  Discuss advance care planning documents  Facilitate a discussion related to patient's goals of care as they align with the patient's values and beliefs.    Health Care Decision Makers:       Primary Decision Maker: Brynn Barrett - Child - [phone]  Summary:  Verified Documents  Verified Healthcare Decision Maker  Updated Healthcare Decision Maker    Advance Care Planning Documents (Patient Wishes):  Healthcare Power of /Advance Directive Appointment of Health Care Agent  Living Will/Advance Directive     Assessment:  Patient has an Advance Medical Directive on file.      Interventions:  Provided education on documents for clarity and greater understanding  Encouraged ongoing ACP conversation with future decision makers and loved ones  Reviewed but did not complete ACP document    Care Preferences Communicated:   No    Outcomes/Plan:  ACP Discussion: Completed  Existing advance directive reviewed with patient; no changes to patient's previously recorded wishes.    Electronically signed by VILMA Dave on 6/5/2025 at 6:55 PM          Spiritual Health History and Assessment/Progress Note  [facility]    (P) Advance Care Planning,  ,  ,      Name: Lauri Hoover MRN: [id number]    Age: 74 y.o.     Sex: male   Language: English   Mandaeism: Other   Hyponatremia     Date: 6/5/2025            Total Time Calculated: (P) 11 min              Spiritual Assessment began in [address]        Referral/Consult From: (P) Multi-disciplinary team   Encounter Overview/Reason: (P) Advance Care Planning  Service Provided For:  (P) Patient    Malini, Belief, Meaning:   Patient has beliefs or practices that help with coping during difficult times  Family/Friends No family/friends present      Importance and Influence:  Patient has spiritual/personal beliefs that influence decisions regarding their health  Family/Friends No family/friends present    Community:  Patient is connected with a spiritual community  Family/Friends No family/friends present    Assessment and Plan of Care:     Patient Interventions include: Facilitated expression of thoughts and feelings and Affirmed coping skills/support systems  Family/Friends Interventions include: No family/friends present    Patient Plan of Care: No spiritual needs identified for follow-up  Family/Friends Plan of Care: No family/friends present    Electronically signed by VILMA Dave on 6/5/2025 at 6:55 PM     86y Female who apparently had a seizure and subsequently fell while at home with family. Trauma B activations, transported by EMS.       A airway intact.  B equal breath sounds bilaterally  C radial/DP/femoral pulses intact bilaterally   D GCS12 [id number], moving all fours, no focal deficits, pupils R 3mm reactive/L 3mm reactive  E patient fully exposed, no gross deformities or bleeding, provided warm blankets    CXR no fracture or hemopneumothorax  FAST negative    Initial vitals:     Secondary survey remarkable for L forehead hematoma.     ROS: 10-system review is otherwise negative except HPI above.      PAST MEDICAL & SURGICAL HISTORY:  Afib  Breast cancer  Mitral valve prolapse  HTN (hypertension)  Dementia  Mitral Valve Prolapse  Depression  CVA (Cerebral Vascular Accident) (ICD9 434.91)  CAD (Coronary Artery Disease) (ICD9 414.00): with STENTS in  apast last CATH  in 10/2009 with 90% MID LAD on Medical managemnt  Dementia (ICD9 294.8)  Anxiety (ICD9 300.00)  GERD (Gastroesophageal Reflux Disease) (ICD9 530.81)  Atrial Fibrillation (ICD9 427.31)  Afib (ICD9 427.31)  Hypothyroidism (ICD9 244.9)  Hx of Breast Cancer (ICD9 V10.3)  Hypercholesterolemia (ICD9 272.0)  HTN  Transient Ischemic Attack (TIA) (ICD9 435.9)  No significant past surgical history  No significant past surgical history  S/P Breast Lumpectomy (ICD9 V45.89)   Section (ICD9 669.70)  After Cataract, Bilateral (ICD9 366.50)    FAMILY HISTORY:  No pertinent family history in first degree relatives  Family history of stroke (Mother)    [] Family history not pertinent as reviewed with the patient and family    SOCIAL HISTORY:  ***    ALLERGIES: latex (Unknown)  No Known Drug Allergies  shellfish (Hives; Rash)  shellfish (Other)      HOME MEDICATIONS: ***    --------------------------------------------------------------------------------------------    PHYSICAL EXAM:   General: NAD, Lying in bed comfortably  Neuro: A, non verbal.   HEENT: NC/AT, EOMI  Neck: Soft, supple  Cardio: RRR, nml S1/S2  Resp: Good effort, CTA b/l  Thorax: No chest wall tenderness  Breast: No lesions/masses, no drainage  GI/Abd: Soft, NT/ND, no rebound/guarding, no masses palpated  Vascular: All 4 extremities warm.  Skin: Intact, no breakdown  Lymphatic/Nodes: No palpable lymphadenopathy  Pelvis: stable  Musculoskeletal: All 4 extremities moving spontaneously, no limitations, no spinal tenderness or stepoffs  --------------------------------------------------------------------------------------------    LABS                 13.2   5.70   )----------(  164       ( 30 Dec 2019 01:37 )               42.7              PT/INR -  22.9 sec / 1.95 ratio   ( 30 Dec 2019 01:39 )       PTT -  32.4 sec   ( 30 Dec 2019 01:39 )  CAPILLARY BLOOD GLUCOSE            01:38 - VBG - pH:       | pCO2:       | pO2:       | Lactate: 2.6      --------------------------------------------------------------------------------------------  IMAGING  CT head:  CT C-spine:  CXR:   CT C/A/P:  CT T-spine:  CT L-spine:    ASSESSMENT: Patient is a 86y old female s/p fall after a seizure.     PLAN:    - C collar  - f/u ortho  - f/u NSx  - NPO  - IVF  - pain control  - DVT ppx  - strict bedrest/OOB/ambulate  - strict I/Os  - Patient seen/examined with attending.  - Plan to be discussed with Attending,

## 2025-07-23 NOTE — PHYSICAL THERAPY INITIAL EVALUATION ADULT - LEVEL OF CONSCIOUSNESS, REHAB EVAL
[place] Cognitive Continuum Level 4b/alert/confused
Billing Type: Third-Party Bill
Performing Laboratory: 0
Bill For Surgical Tray: no
Expected Date Of Service: 07/23/2025